# Patient Record
Sex: FEMALE | Race: WHITE | NOT HISPANIC OR LATINO | Employment: UNEMPLOYED | URBAN - METROPOLITAN AREA
[De-identification: names, ages, dates, MRNs, and addresses within clinical notes are randomized per-mention and may not be internally consistent; named-entity substitution may affect disease eponyms.]

---

## 2017-01-07 ENCOUNTER — HOSPITAL ENCOUNTER (OUTPATIENT)
Dept: RADIOLOGY | Facility: HOSPITAL | Age: 52
Discharge: HOME | End: 2017-01-07
Attending: FAMILY MEDICINE
Payer: MEDICAID

## 2017-01-07 DIAGNOSIS — E04.2 MULTIPLE THYROID NODULES: ICD-10-CM

## 2017-01-07 PROCEDURE — 76536 US EXAM OF HEAD AND NECK: CPT | Mod: TC

## 2017-01-07 PROCEDURE — 76536 US EXAM OF HEAD AND NECK: CPT | Mod: 26,,, | Performed by: RADIOLOGY

## 2017-01-10 ENCOUNTER — LAB VISIT (OUTPATIENT)
Dept: LAB | Facility: HOSPITAL | Age: 52
End: 2017-01-10
Attending: FAMILY MEDICINE
Payer: MEDICAID

## 2017-01-10 ENCOUNTER — CLINICAL SUPPORT (OUTPATIENT)
Dept: SPEECH THERAPY | Facility: HOSPITAL | Age: 52
End: 2017-01-10
Attending: FAMILY MEDICINE
Payer: MEDICAID

## 2017-01-10 DIAGNOSIS — D68.59 PROTEIN C DEFICIENCY: ICD-10-CM

## 2017-01-10 DIAGNOSIS — M25.60 DECREASED RANGE OF MOTION: ICD-10-CM

## 2017-01-10 DIAGNOSIS — M54.2 NECK PAIN ON LEFT SIDE: ICD-10-CM

## 2017-01-10 DIAGNOSIS — M79.602 PAIN OF LEFT UPPER EXTREMITY: ICD-10-CM

## 2017-01-10 DIAGNOSIS — R53.1 DECREASED STRENGTH: ICD-10-CM

## 2017-01-10 LAB
INR PPP: 4.6
PROTHROMBIN TIME: 46.4 SEC

## 2017-01-10 PROCEDURE — 97110 THERAPEUTIC EXERCISES: CPT

## 2017-01-10 PROCEDURE — 36415 COLL VENOUS BLD VENIPUNCTURE: CPT

## 2017-01-10 PROCEDURE — 85610 PROTHROMBIN TIME: CPT

## 2017-01-10 NOTE — PROGRESS NOTES
"TIME RECORD    Date:  01/10/2017    Start Time:  0911  Stop Time:  0955    PROCEDURES:    TIMED  Procedure Min.   MT 15   TE 29                 UNTIMED  Procedure Min.             Total Timed Minutes:  44  Total Timed Units:  3  Total Untimed Units:  0  Charges Billed/# of units:  TE-3      Progress/Current Status    Subjective:     Patient ID: Aggie Rasheed is a 51 y.o. female.  Diagnosis:   1. Decreased range of motion     2. Neck pain on left side     3. Pain of left upper extremity     4. Decreased strength          Pain: 4-5/10  Pt states she continues to have pain in neck and L arm.    Objective:     Pt initiated treatment with objective measures taken x 10' f/b MT x 15' consisting of STM/MFR to cervical paraspinals, L UT, and suboccipitals, UT/LS str with pt in supine, then to R SL for L scapular framing. TE 1:1 with PT per log x 19'.    Date 1/10/2017   Visit 2   POC exp  2/7/17    MHP --   MT 15'   UT Str. 3x30" L   LV Str. 3x30" L   Pec Str. 3x30"   Chin Tuck 10x5"   DNF --   Cervical Yasmin 5x5" ea   Rot/SB   Scap ret 10x5"   Lats --   Rows --   Horizontal Abd --   Scaption --   Wall Slides --   Initials KV         Range of Motion/Strength: BUE AROM WNL      Cervical AROM: Pain/Dysfunction with Movement:   Flexion 40 degrees  + into neck and arm   Extension 38 degrees + into neck and arm; more painful than flex   Right side bending WNL     Left side bending 30 degrees     Right rotation 57 degrees pain   Left rotation 52 degrees Pain > R rot      Shoulder   Right     Left   Pain/Dysfunction with Movement     AROM PROM MMT AROM PROM MMT     flexion     5/5     5/5     extension                 abduction     5/5     5/5     adduction                 Internal rotation     5/5     5/5     ER at 90° abd                 ER at 0° abd     5/5     4+/5        Elbow   Right     Left   Pain/Dysfunction with Movement     AROM PROM MMT AROM PROM MMT     flexion     5/5     5/5     extension     5/5     4+/5   " "        Assessment:     Objective measures taken today. Pt cervical ROM has improved some, but continues to have pain in neck with head movement. Pt reported she felt "dizzy" after transferring supine > sit EOM. Also stated she did not eat breakfast this morning. Discussed importance of eating before coming to therapy. Pt verbalized understanding.    Patient Education/Response:     Gave pt HEP consisting of UT and LS str. Demo understanding by performing stretches.    Plans and Goals:     Cont POC. Progress as able.    Short Term Goals (4 Weeks):   1. Improve c-spine flexion and ext 10 degrees each  2. Improve c-spine rotation 10 degrees each  3. Pt will demonstrate FOTO neck survey result of 35% or better    Long Term Goals (8 Weeks):   1. C-spine AROM flexion and rotation WFL  2. Slight/no left c-spine palpable muscle spasms.  3. Pt will demonstrate FOTO neck survey result of 32% or better      "

## 2017-01-12 ENCOUNTER — CLINICAL SUPPORT (OUTPATIENT)
Dept: SPEECH THERAPY | Facility: HOSPITAL | Age: 52
End: 2017-01-12
Attending: FAMILY MEDICINE
Payer: MEDICAID

## 2017-01-12 ENCOUNTER — OFFICE VISIT (OUTPATIENT)
Dept: FAMILY MEDICINE | Facility: HOSPITAL | Age: 52
End: 2017-01-12
Attending: FAMILY MEDICINE
Payer: MEDICAID

## 2017-01-12 VITALS
WEIGHT: 137.13 LBS | HEIGHT: 62 IN | DIASTOLIC BLOOD PRESSURE: 84 MMHG | BODY MASS INDEX: 25.23 KG/M2 | HEART RATE: 101 BPM | SYSTOLIC BLOOD PRESSURE: 150 MMHG

## 2017-01-12 DIAGNOSIS — R53.1 DECREASED STRENGTH: ICD-10-CM

## 2017-01-12 DIAGNOSIS — D68.59 PROTEIN C DEFICIENCY: ICD-10-CM

## 2017-01-12 DIAGNOSIS — E04.2 MULTIPLE THYROID NODULES: ICD-10-CM

## 2017-01-12 DIAGNOSIS — I10 BENIGN ESSENTIAL HTN: ICD-10-CM

## 2017-01-12 DIAGNOSIS — M25.60 DECREASED RANGE OF MOTION: ICD-10-CM

## 2017-01-12 DIAGNOSIS — M79.602 PAIN OF LEFT UPPER EXTREMITY: ICD-10-CM

## 2017-01-12 DIAGNOSIS — G43.009 MIGRAINE WITHOUT AURA AND WITHOUT STATUS MIGRAINOSUS, NOT INTRACTABLE: ICD-10-CM

## 2017-01-12 DIAGNOSIS — M54.2 NECK PAIN ON LEFT SIDE: ICD-10-CM

## 2017-01-12 DIAGNOSIS — M54.2 NECK PAIN ON LEFT SIDE: Primary | ICD-10-CM

## 2017-01-12 DIAGNOSIS — E11.9 TYPE 2 DIABETES MELLITUS WITHOUT OPHTHALMIC MANIFESTATIONS: ICD-10-CM

## 2017-01-12 PROCEDURE — 97110 THERAPEUTIC EXERCISES: CPT

## 2017-01-12 PROCEDURE — 99214 OFFICE O/P EST MOD 30 MIN: CPT | Performed by: FAMILY MEDICINE

## 2017-01-12 RX ORDER — METHOCARBAMOL 500 MG/1
500 TABLET, FILM COATED ORAL 4 TIMES DAILY
Qty: 40 TABLET | Refills: 0 | Status: SHIPPED | OUTPATIENT
Start: 2017-01-12 | End: 2017-01-22

## 2017-01-12 RX ORDER — BUTALBITAL, ACETAMINOPHEN AND CAFFEINE 50; 325; 40 MG/1; MG/1; MG/1
1 TABLET ORAL DAILY PRN
Qty: 30 TABLET | Refills: 0 | Status: SHIPPED | OUTPATIENT
Start: 2017-01-12 | End: 2017-02-22 | Stop reason: SDUPTHER

## 2017-01-12 NOTE — PROGRESS NOTES
"TIME RECORD    Date:  01/12/2017    Start Time:  8:05  Stop Time:  8:55    PROCEDURES:    TIMED  Procedure Min.   MT 25   TE 25                 UNTIMED  Procedure Min.             Total Timed Minutes:  45  Total Timed Units:  3  Total Untimed Units:  0  Charges Billed/# of units:  3TE      Progress/Current Status    Subjective:     Patient ID: Aggie Rasheed is a 51 y.o. female.  Diagnosis:   1. Decreased range of motion     2. Neck pain on left side     3. Pain of left upper extremity     4. Decreased strength       Pain: 5 /10  Pt reports she has left neck down to her shoulder described as aching.    Objective:     Pt initiated treatment with Manual Therapy x 25' consisting of STM/MFR to cervical paraspinals, L UT, and suboccipitals, UT/LS str with pt in supine, then to R SL for L scapular framing. TE 1:1 with PTA per log x 25'.    Date 1/12/17 1/10/2017   Visit 3 2   POC exp  2/7/17     MHP - --   MT 20' 15'   UT Str. 30"x3  3x30" L   LV Str. 30"x3 3x30" L   Pec Str. 30"x3 3x30"   Chin Tuck 10x5" 10x5"   DNF - --   Cervical Yasmin 5"x5 ea  Rot/SB 5x5" ea   Rot/SB   Scap ret 10x5" 10x5"   Lats - --   Rows - --   Horizontal Abd - --   Scaption - --   Wall Slides - --   Initials JA 1/6 KV         Assessment:     Pt able to complete today's session with no reports of increased pain, no adverse reactions.     Patient Education/Response:     Cont HEP    Plans and Goals:     Cont PT to progress as able,   Short Term Goals (4 Weeks):   1. Improve c-spine flexion and ext 10 degrees each  2. Improve c-spine rotation 10 degrees each  3. Pt will demonstrate FOTO neck survey result of 35% or better    Long Term Goals (8 Weeks):   1. C-spine AROM flexion and rotation WFL  2. Slight/no left c-spine palpable muscle spasms.  3. Pt will demonstrate FOTO neck survey result of 32% or better        "

## 2017-01-12 NOTE — PROGRESS NOTES
Subjective:       Patient ID: Aggie Rasheed is a 51 y.o. female.    Chief Complaint: Results (ultrasound)    HPI   Pt is a 51 year old female with PMH DMII, HTN, HLD, Protein C Deficiency on Coumadin who presents to clinic for follow up on recent thyroid ultrasound and INR result. Pt states that she recently started working with PT/OT for her neck pain. She states that she is compliant with all her meds. Pt is currently taking coumadin 2.5 mg six days per week and 5 mg for one day.       Review of Systems   Constitutional: Negative for chills, fatigue and fever.   HENT: Negative for sneezing and sore throat.    Eyes: Negative for pain.   Respiratory: Negative for cough, chest tightness, shortness of breath and wheezing.    Cardiovascular: Negative for chest pain and palpitations.   Gastrointestinal: Negative for abdominal pain, constipation, diarrhea, nausea and vomiting.   Genitourinary: Negative for dysuria.   Musculoskeletal: Negative for back pain.        Neck pain.    Skin: Negative for rash.   Neurological: Negative for headaches.   Psychiatric/Behavioral: Negative for behavioral problems.       Objective:      Vitals:    01/12/17 1508   BP: (!) 150/84   Pulse: 101     Physical Exam   Constitutional: She is oriented to person, place, and time. She appears well-developed and well-nourished.   HENT:   Head: Normocephalic and atraumatic.   Eyes: Conjunctivae are normal. Pupils are equal, round, and reactive to light.   Neck: Normal range of motion.   Cardiovascular: Normal rate, normal heart sounds and intact distal pulses.    Pulmonary/Chest: Effort normal and breath sounds normal. She has no wheezes. She exhibits no tenderness.   Abdominal: Soft. Bowel sounds are normal. There is no tenderness.   Neurological: She is alert and oriented to person, place, and time.   Skin: Skin is warm.   Psychiatric: She has a normal mood and affect.       Assessment:       1. Neck pain on left side    2. Migraine without  aura and without status migrainosus, not intractable    3. Benign essential HTN    4. Protein C deficiency    5. Type 2 diabetes mellitus without ophthalmic manifestations    6. Multiple thyroid nodules        Plan:       Neck pain on left side  -     methocarbamol (ROBAXIN) 500 MG Tab; Take 1 tablet (500 mg total) by mouth 4 (four) times daily.  Dispense: 40 tablet; Refill: 0  - Cont PT/OT     Migraine without aura and without status migrainosus, not intractable  -     butalbital-acetaminophen-caffeine -40 mg (FIORICET, ESGIC) -40 mg per tablet; Take 1 tablet by mouth daily as needed.  Dispense: 30 tablet; Refill: 0    Benign essential HTN  - 150/84 today but states that her BP sys at home have been in the 130s. She states it is elevated now due to neck pain  - Will monitor on next visit and adjust as needed.     Protein C deficiency  -     Protime-INR; Future; Expected date: 1/12/17  - Recent INR on 1/10/17 at 4.6. Advised pt to cut back to 2.5 mg QD. Will recheck INR again for 1/17/17 and if below 3, will plan to switch to Xarelto to pt's large shifts on Coumadin with INR.     Type 2 diabetes mellitus without ophthalmic manifestations  - A1c 6.7 in 12/16  - Cont Metformin and glipzide    Multiple thyroid nodules  -     Ambulatory referral to General Surgery  - recent u/s shows one nodule right lobe is solid measuring 1.5 x 1.1 x 1.2 CM.  Plan for FNA       Return in about 1 month (around 2/12/2017).

## 2017-01-17 ENCOUNTER — LAB VISIT (OUTPATIENT)
Dept: LAB | Facility: HOSPITAL | Age: 52
End: 2017-01-17
Attending: FAMILY MEDICINE
Payer: MEDICAID

## 2017-01-17 ENCOUNTER — CLINICAL SUPPORT (OUTPATIENT)
Dept: SPEECH THERAPY | Facility: HOSPITAL | Age: 52
End: 2017-01-17
Attending: FAMILY MEDICINE
Payer: MEDICAID

## 2017-01-17 DIAGNOSIS — D68.59 PROTEIN C DEFICIENCY: ICD-10-CM

## 2017-01-17 DIAGNOSIS — R53.1 DECREASED STRENGTH: ICD-10-CM

## 2017-01-17 DIAGNOSIS — M54.2 NECK PAIN ON LEFT SIDE: ICD-10-CM

## 2017-01-17 DIAGNOSIS — M79.602 PAIN OF LEFT UPPER EXTREMITY: ICD-10-CM

## 2017-01-17 DIAGNOSIS — M25.60 DECREASED RANGE OF MOTION: ICD-10-CM

## 2017-01-17 LAB
INR PPP: 4.8
PROTHROMBIN TIME: 48.3 SEC

## 2017-01-17 PROCEDURE — 97110 THERAPEUTIC EXERCISES: CPT

## 2017-01-17 PROCEDURE — 85610 PROTHROMBIN TIME: CPT

## 2017-01-17 PROCEDURE — 36415 COLL VENOUS BLD VENIPUNCTURE: CPT

## 2017-01-17 NOTE — PROGRESS NOTES
"TIME RECORD    Date:  01/17/2017    Start Time:  1435  Stop Time:  1517    PROCEDURES:    TIMED  Procedure Min.   MT 15   TE 27                 UNTIMED  Procedure Min.             Total Timed Minutes:  42  Total Timed Units:  3  Total Untimed Units:  0  Charges Billed/# of units:  TE-3      Progress/Current Status    Subjective:     Patient ID: Aggie Rasheed is a 51 y.o. female.  Diagnosis:   1. Decreased range of motion     2. Neck pain on left side     3. Pain of left upper extremity     4. Decreased strength       Pain: 2 /10 in L neck and shoulder  Pt reports she woke up at 3 am with pain, but her pain decreased as the day progressed.    Objective:     Pt initiated treatment with MT x 15' consisting of STM/MFR to upper thoracic and cervical paraspinals, L UT, and suboccipitals, UT/LS str with pt in supine. TE 1:1 with PT per log x 27'. **FOTO NEXT VISIT**    Date 1/17/2017 1/12/17 1/10/2017   Visit 4 3 2   POC exp  2/7/17      MHP -- - --   MT 15' 20' 15'   UT Str. 3x30" B 30"x3  3x30" L   LV Str. 3x30" B 30"x3 3x30" L   Pec Str. 3x30" 30"x3 3x30"   Chin Tuck 12x5" 10x5" 10x5"   Cervical rot 5x5" B     DNF -- - --   Cervical Yasmin 5"x5 ea  Rot/SB 5"x5 ea  Rot/SB 5x5" ea   Rot/SB   Scap ret 10x5" 10x5" 10x5"   Lats 2x10 RTB - --   Rows 2x10 RTB - --   Horizontal Abd -- - --   Scaption -- - --   Wall Slides  - --   Initials KV JA 1/6 KV         Assessment:     Pt tolerated treatment fairly well with reports of increased pain to 3/10 with shoulder lats and rows. Will assess tolerance to treatment next visit.     Patient Education/Response:     Cont HEP    Plans and Goals:     Cont PT to progress as able,   Short Term Goals (4 Weeks):   1. Improve c-spine flexion and ext 10 degrees each  2. Improve c-spine rotation 10 degrees each  3. Pt will demonstrate FOTO neck survey result of 35% or better    Long Term Goals (8 Weeks):   1. C-spine AROM flexion and rotation WFL  2. Slight/no left c-spine palpable muscle " spasms.  3. Pt will demonstrate FOTO neck survey result of 32% or better

## 2017-01-23 DIAGNOSIS — D68.59 PROTEIN C DEFICIENCY: Primary | ICD-10-CM

## 2017-01-23 NOTE — PROGRESS NOTES
Pt called and updated on INR of 4.8. Advised pt to take coumadin 5 x this week and hold dose on Sat/Sun. Will recheck INR in 1 week and if below 3, will plan to switch to Xarelto to pt's large shifts on Coumadin with INR.     Juan Chavez

## 2017-01-24 ENCOUNTER — CLINICAL SUPPORT (OUTPATIENT)
Dept: SPEECH THERAPY | Facility: HOSPITAL | Age: 52
End: 2017-01-24
Attending: FAMILY MEDICINE
Payer: MEDICAID

## 2017-01-24 DIAGNOSIS — M54.2 NECK PAIN ON LEFT SIDE: ICD-10-CM

## 2017-01-24 DIAGNOSIS — M79.602 PAIN OF LEFT UPPER EXTREMITY: ICD-10-CM

## 2017-01-24 DIAGNOSIS — I10 ESSENTIAL HYPERTENSION: ICD-10-CM

## 2017-01-24 DIAGNOSIS — R53.1 DECREASED STRENGTH: ICD-10-CM

## 2017-01-24 DIAGNOSIS — M25.60 DECREASED RANGE OF MOTION: ICD-10-CM

## 2017-01-24 PROCEDURE — 97140 MANUAL THERAPY 1/> REGIONS: CPT

## 2017-01-24 PROCEDURE — 97110 THERAPEUTIC EXERCISES: CPT

## 2017-01-24 RX ORDER — CLONIDINE HYDROCHLORIDE 0.2 MG/1
TABLET ORAL
Qty: 180 TABLET | Refills: 0 | Status: SHIPPED | OUTPATIENT
Start: 2017-01-24 | End: 2017-04-24 | Stop reason: SDUPTHER

## 2017-01-24 NOTE — PROGRESS NOTES
"TIME RECORD    Date:  01/24/2017    Start Time:  1:10  Stop Time:  2:00    PROCEDURES:    TIMED  Procedure Min.   MT 25   TE 25                 UNTIMED  Procedure Min.             Total Timed Minutes:  50  Total Timed Units:  3  Total Untimed Units:  0  Charges Billed/# of units:  TE-2, MT 1      Progress/Current Status    Subjective:     Patient ID: Aggie Rasheed is a 51 y.o. female.  Diagnosis:   1. Decreased range of motion     2. Neck pain on left side     3. Pain of left upper extremity     4. Decreased strength       Pain: 3 /10 in L neck and shoulder   1/10 following interventions    Objective:     Pt initiated treatment with MT x 25' consisting of STM/MFR to upper thoracic and cervical paraspinals, B SCM, Scalenes B  UT, and suboccipitals, UT/LS str with pt in supine. TE 1:1 with PT per log x 25'. **FOTO NEXT VISIT**    Date 1/24/17 1/17/2017 1/12/17 1/10/2017   Visit 5 4 3 2   POC exp  2/7/17       MHP  -- - --   MT 25' 15' 20' 15'   UT Str. 3 x 30" 3x30" B 30"x3  3x30" L   LV Str. 3 x 30" 3x30" B 30"x3 3x30" L   Pec Str. 3 x 30" 3x30" 30"x3 3x30"   Chin Tuck 15x 5" 12x5" 10x5" 10x5"   Cervical rot 5 x 5 B 5x5" B     DNF  -- - --   Cervical Yasmin --- 5"x5 ea  Rot/SB 5"x5 ea  Rot/SB 5x5" ea   Rot/SB   Scap ret 10 x 5" 10x5" 10x5" 10x5"   Lats 2 x 10 RTB 2x10 RTB - --   Rows 2 x 10 RTB 2x10 RTB - --   Horizontal Abd  -- - --   Scaption  -- - --   Wall Slides   - --   Initials MB 1/6 KV JA 1/6 KV         Assessment:     Pt tolerated treatment fairly well. Reviewed HEP and necessity of compliance. Will assess tolerance to treatment next visit.     Patient Education/Response:     Cont HEP    Plans and Goals:     Cont PT to progress as able,   Short Term Goals (4 Weeks):   1. Improve c-spine flexion and ext 10 degrees each  2. Improve c-spine rotation 10 degrees each  3. Pt will demonstrate FOTO neck survey result of 35% or better    Long Term Goals (8 Weeks):   1. C-spine AROM flexion and rotation WFL  2. " Slight/no left c-spine palpable muscle spasms.  3. Pt will demonstrate FOTO neck survey result of 32% or better

## 2017-01-26 ENCOUNTER — CLINICAL SUPPORT (OUTPATIENT)
Dept: SPEECH THERAPY | Facility: HOSPITAL | Age: 52
End: 2017-01-26
Attending: FAMILY MEDICINE
Payer: MEDICAID

## 2017-01-26 DIAGNOSIS — M54.2 NECK PAIN ON LEFT SIDE: ICD-10-CM

## 2017-01-26 DIAGNOSIS — M79.602 PAIN OF LEFT UPPER EXTREMITY: ICD-10-CM

## 2017-01-26 DIAGNOSIS — R53.1 DECREASED STRENGTH: ICD-10-CM

## 2017-01-26 DIAGNOSIS — M25.60 DECREASED RANGE OF MOTION: ICD-10-CM

## 2017-01-26 PROCEDURE — 97110 THERAPEUTIC EXERCISES: CPT

## 2017-01-26 PROCEDURE — 97124 MASSAGE THERAPY: CPT

## 2017-01-26 NOTE — PROGRESS NOTES
"TIME RECORD    Date:  01/26/2017    Start Time:  1:10  Stop Time:  2:05    PROCEDURES:    TIMED  Procedure Min.   MT 20   TE 30                 UNTIMED  Procedure Min.   UBE 5         Total Timed Minutes:  50  Total Timed Units:  3  Total Untimed Units:  0  Charges Billed/# of units:  TE-2, MT 1      Progress/Current Status    Subjective:     Patient ID: Aggie Rasheed is a 51 y.o. female.  Diagnosis:   1. Decreased range of motion     2. Neck pain on left side     3. Pain of left upper extremity     4. Decreased strength       Pain: 3 /10 in L neck and L shoulder. Reports feeling a little sore following last visit but better somehow.    1/10 following interventions    Objective:     Pt initiated treatment with MT x 15' consisting of STM/MFR to upper thoracic and cervical paraspinals, B SCM, Scalenes B  UT, and suboccipitals, UT/LS str with pt in supine. TE supervised with PTA per log x 35'. Prefers to not lay on her stomach for TE. **FOTO NEXT VISIT**    Date 1/26/17 1/24/17 1/17/2017 1/12/17 1/10/2017   Visit 6 5 4 3 2           POC exp  2/7/17        UBE 2 x 15       MHP --- --- -- - --   MT 15' 25' 15' 20' 15'   UT Str. 3 x 30" B 3 x 30" 3x30" B 30"x3  3x30" L   LV Str. 3 x 30" B 3 x 30" 3x30" B 30"x3 3x30" L   Pec Str. 3 x 30" B 3 x 30" 3x30" 30"x3 3x30"   Chin Tuck 15 x 5" 15x 5" 12x5" 10x5" 10x5"   Cervical rot 5 x 5 B 5 x 5 B 5x5" B     DNF   -- - --   Cervical Yasmin  --- 5"x5 ea  Rot/SB 5"x5 ea  Rot/SB 5x5" ea   Rot/SB   Scap ret  10 x 5" 10x5" 10x5" 10x5"   Lats 2 x 15 B  RTB 2 x 10 RTB 2x10 RTB - --   ER L 2x15 RTB       SL Abd 2x10 1#       Rows 2x15 B  RTB 2 x 10 RTB 2x10 RTB - --   Horizontal Abd   -- - --   Scaption   -- - --   Wall Slides    - --   Initials MB 2/6 MB 1/6 KV JA 1/6 KV         Assessment:     Pt tolerated progression of treatment fairly well. Continued intermittent c/o tingling sensation L UE. Reviewed HEP and necessity of compliance. Will assess tolerance to treatment next visit. "     Patient Education/Response:     Cont HEP    Plans and Goals:     Cont PT to progress as able,   Short Term Goals (4 Weeks):   1. Improve c-spine flexion and ext 10 degrees each  2. Improve c-spine rotation 10 degrees each  3. Pt will demonstrate FOTO neck survey result of 35% or better    Long Term Goals (8 Weeks):   1. C-spine AROM flexion and rotation WFL  2. Slight/no left c-spine palpable muscle spasms.  3. Pt will demonstrate FOTO neck survey result of 32% or better

## 2017-01-30 ENCOUNTER — CLINICAL SUPPORT (OUTPATIENT)
Dept: SPEECH THERAPY | Facility: HOSPITAL | Age: 52
End: 2017-01-30
Attending: FAMILY MEDICINE
Payer: MEDICAID

## 2017-01-30 DIAGNOSIS — R53.1 DECREASED STRENGTH: ICD-10-CM

## 2017-01-30 DIAGNOSIS — M25.60 DECREASED RANGE OF MOTION: ICD-10-CM

## 2017-01-30 DIAGNOSIS — M54.2 NECK PAIN ON LEFT SIDE: ICD-10-CM

## 2017-01-30 DIAGNOSIS — M79.602 PAIN OF LEFT UPPER EXTREMITY: ICD-10-CM

## 2017-01-30 PROCEDURE — 97140 MANUAL THERAPY 1/> REGIONS: CPT

## 2017-01-30 PROCEDURE — 97110 THERAPEUTIC EXERCISES: CPT

## 2017-01-30 NOTE — PROGRESS NOTES
"TIME RECORD    Date:  01/30/2017    Start Time:  300  Stop Time:  400    PROCEDURES:    TIMED  Procedure Min.   MT 20   TE 10   TE supervised 30  NC             UNTIMED  Procedure Min.             Total Timed Minutes:  30  Total Timed Units:  2  Total Untimed Units:  0  Charges Billed/# of units:  TE 1, MT 1      Progress/Current Status    Subjective:     Patient ID: Aggie Rasheed is a 51 y.o. female.  Diagnosis:   1. Decreased range of motion     2. Neck pain on left side     3. Pain of left upper extremity     4. Decreased strength       Pain: 2-3 /10 in L neck and L shoulder.   Objective:     Pt received MT x 20 minutes consisting of STM/MFR to upper thoracic and cervical paraspinals, B SCM, Scalenes B  UT, and suboccipitals, UT/LS str with pt in supine. TE 1:1 with PTA x 10 minutes and supervised with PTA per log x 30'.   PATIENT DOES NOT LIKE PRONE  Date 1/30/17 1/26/17 1/24/17 1/17/2017 1/12/17 1/10/2017   Visit 7 6 5 4 3 2            POC exp  2/7/17          1/1 2 x 15       MHP -- --- --- -- - --   MT 20' 15' 25' 15' 20' 15'   UT Str. 30"x3 B 3 x 30" B 3 x 30" 3x30" B 30"x3  3x30" L   LV Str. 30"x3 B 3 x 30" B 3 x 30" 3x30" B 30"x3 3x30" L   Pec Str. 30"x3 B 3 x 30" B 3 x 30" 3x30" 30"x3 3x30"   Chin Tuck 5"x15 15 x 5" 15x 5" 12x5" 10x5" 10x5"   Cervical rot 5"x5 B 5 x 5 B 5 x 5 B 5x5" B     DNF -   -- - --   Cervical Yasmin -  --- 5"x5 ea  Rot/SB 5"x5 ea  Rot/SB 5x5" ea   Rot/SB   Scap ret 5"x10  10 x 5" 10x5" 10x5" 10x5"   Lats 2x15 B RTB 2 x 15 B  RTB 2 x 10 RTB 2x10 RTB - --   ER L 2x15 RTB 2x15 RTB       SL Abd 1#  2x10 2x10 1#       Rows 2x15 B RTB 2x15 B  RTB 2 x 10 RTB 2x10 RTB - --   Horizontal Abd -   -- - --   Scaption -   -- - --   Wall Slides -    - --   Initials CS 3/6 MB 2/6 MB 1/6 KV JA 1/6 KV         Assessment:     Patient performed above without report of increased pain.    Patient Education/Response:     Cont HEP    Plans and Goals:     Cont PT to progress as able,   Short Term " Goals (4 Weeks):   1. Improve c-spine flexion and ext 10 degrees each  2. Improve c-spine rotation 10 degrees each  3. Pt will demonstrate FOTO neck survey result of 35% or better    Long Term Goals (8 Weeks):   1. C-spine AROM flexion and rotation WFL  2. Slight/no left c-spine palpable muscle spasms.  3. Pt will demonstrate FOTO neck survey result of 32% or better

## 2017-01-31 ENCOUNTER — HOSPITAL ENCOUNTER (OUTPATIENT)
Dept: RADIOLOGY | Facility: HOSPITAL | Age: 52
Discharge: HOME | End: 2017-01-31
Attending: INTERNAL MEDICINE
Payer: MEDICAID

## 2017-01-31 DIAGNOSIS — M25.50 MULTIPLE JOINT PAIN: ICD-10-CM

## 2017-01-31 DIAGNOSIS — D68.59 PROTEIN C DEFICIENCY: ICD-10-CM

## 2017-01-31 DIAGNOSIS — M25.50 MULTIPLE JOINT PAIN: Primary | ICD-10-CM

## 2017-01-31 DIAGNOSIS — M32.9 SYSTEMIC LUPUS ERYTHEMATOSUS: ICD-10-CM

## 2017-01-31 PROCEDURE — 73130 X-RAY EXAM OF HAND: CPT | Mod: 50,TC

## 2017-01-31 PROCEDURE — 73130 X-RAY EXAM OF HAND: CPT | Mod: 26,50,, | Performed by: RADIOLOGY

## 2017-02-01 ENCOUNTER — CLINICAL SUPPORT (OUTPATIENT)
Dept: SPEECH THERAPY | Facility: HOSPITAL | Age: 52
End: 2017-02-01
Attending: FAMILY MEDICINE
Payer: MEDICAID

## 2017-02-01 DIAGNOSIS — M25.60 DECREASED RANGE OF MOTION: ICD-10-CM

## 2017-02-01 DIAGNOSIS — M54.2 NECK PAIN ON LEFT SIDE: ICD-10-CM

## 2017-02-01 DIAGNOSIS — M79.602 PAIN OF LEFT UPPER EXTREMITY: ICD-10-CM

## 2017-02-01 DIAGNOSIS — R53.1 DECREASED STRENGTH: ICD-10-CM

## 2017-02-01 PROCEDURE — 97110 THERAPEUTIC EXERCISES: CPT

## 2017-02-01 NOTE — PROGRESS NOTES
"TIME RECORD    Date:  02/01/2017    Start Time:  205  Stop Time:  255    PROCEDURES:    TIMED  Procedure Min.   Manual therapy 20   Therex 30         Total Timed Minutes:  50  Total Timed Units:  3  Total Untimed Units:  0  Charges Billed/# of units:  3  TE      Progress/Current Status    Subjective:     Patient ID: Aggie Rasheed is a 51 y.o. female.  Diagnosis:   1. Decreased range of motion     2. Neck pain on left side     3. Pain of left upper extremity     4. Decreased strength       Pain: 2 /10  Patient reports she has been having left elbow pain. States MD thinks she "may have irritated a tendon. - or something like that - she's hard to understand."    Objective:     Pt received MT x 20 minutes consisting of STM/MFR to upper thoracic and cervical paraspinals, B SCM, Scalenes B  UT, and suboccipitals, UT/LS str with pt in supine. TE 1:1 with PTA per log x 30'.   PATIENT DOES NOT LIKE PRONE  Date 2/1/17 1/30/17 1/26/17 1/24/17 1/17/2017 1/12/17 1/10/2017   Visit 8 7 6 5 4 3 2             POC exp  2/7/17          UBE HELD 120 1/1 2 x 15       MHP  -- --- --- -- - --   MT 20' 20' 15' 25' 15' 20' 15'   UT Str. 30"x3 B 30"x3 B 3 x 30" B 3 x 30" 3x30" B 30"x3  3x30" L   LV Str. 30"x3 B 30"x3 B 3 x 30" B 3 x 30" 3x30" B 30"x3 3x30" L   Pec Str. 30"x3 B 30"x3 B 3 x 30" B 3 x 30" 3x30" 30"x3 3x30"   Chin Tuck 5"x15 5"x15 15 x 5" 15x 5" 12x5" 10x5" 10x5"   Cervical rot 5"x5 5"x5 B 5 x 5 B 5 x 5 B 5x5" B     DNF  -   -- - --   Cervical Yasmin  -  --- 5"x5 ea  Rot/SB 5"x5 ea  Rot/SB 5x5" ea   Rot/SB   Scap ret 5"x10 5"x10  10 x 5" 10x5" 10x5" 10x5"   Lats RTB   2x15 2x15 B RTB 2 x 15 B  RTB 2 x 10 RTB 2x10 RTB - --   ER L HELD 2x15 RTB 2x15 RTB       SL Abd 1# 3x10 1#  2x10 2x10 1#       Rows HELD 2x15 B RTB 2x15 B  RTB 2 x 10 RTB 2x10 RTB - --   Horizontal Abd  -   -- - --   Scaption  -   -- - --   Wall Slides  -    - --   Initials DH 4/6 CS 3/6 MB 2/6 MB 1/6 KV JA 1/6 KV       Assessment:     Patient able to increase " "activity as noted.  Held RTM ER and rows to not irritate left elbow today.  Patietn states " a little sore - but no pain" after treatment.    Patient Education/Response:     Patient educated to continue HEP.  Verbalized understanding.    Plans and Goals:     Cont PT to progress as able,     Short Term Goals (4 Weeks):   1. Improve c-spine flexion and ext 10 degrees each  2. Improve c-spine rotation 10 degrees each  3. Pt will demonstrate FOTO neck survey result of 35% or better    Long Term Goals (8 Weeks):   1. C-spine AROM flexion and rotation WFL  2. Slight/no left c-spine palpable muscle spasms.  3. Pt will demonstrate FOTO neck survey result of 32% or better        "

## 2017-02-02 ENCOUNTER — TELEPHONE (OUTPATIENT)
Dept: FAMILY MEDICINE | Facility: HOSPITAL | Age: 52
End: 2017-02-02

## 2017-02-02 NOTE — TELEPHONE ENCOUNTER
----- Message from Minerva Torres sent at 2/1/2017  3:05 PM CST -----  Contact: [facility]  Would like to know if you need to add any other blood test for Pt

## 2017-02-02 NOTE — TELEPHONE ENCOUNTER
----- Message from Minerva Torres sent at 1/31/2017  1:46 PM CST -----  Contact: self   Pt is waiting on order for her blood work.     Also, she is having an ultra sound biopsy on her throat( Feb 8th ) and would like to know how many days before her procedure she should stop blood thinner pills.

## 2017-02-03 DIAGNOSIS — Z79.01 ANTICOAGULANT LONG-TERM USE: Primary | ICD-10-CM

## 2017-02-03 NOTE — PROGRESS NOTES
Pt scheduled for thyroid biopsy on 2/8/17. On coumadin for protein s deficiency. Informed patient to stop coumadin today and restart day after procedure. INR on Monday and Wednesday.  Last INR supratherapeutic; adjustments made on dose, but no f/u INR seen.

## 2017-02-06 ENCOUNTER — LAB VISIT (OUTPATIENT)
Dept: LAB | Facility: HOSPITAL | Age: 52
End: 2017-02-06
Attending: FAMILY MEDICINE
Payer: MEDICAID

## 2017-02-06 DIAGNOSIS — Z79.01 ANTICOAGULANT LONG-TERM USE: ICD-10-CM

## 2017-02-06 LAB
INR PPP: 1.1
PROTHROMBIN TIME: 11.6 SEC

## 2017-02-06 PROCEDURE — 85610 PROTHROMBIN TIME: CPT

## 2017-02-06 PROCEDURE — 36415 COLL VENOUS BLD VENIPUNCTURE: CPT

## 2017-02-07 ENCOUNTER — CLINICAL SUPPORT (OUTPATIENT)
Dept: SPEECH THERAPY | Facility: HOSPITAL | Age: 52
End: 2017-02-07
Attending: FAMILY MEDICINE
Payer: MEDICAID

## 2017-02-07 DIAGNOSIS — R53.1 DECREASED STRENGTH: ICD-10-CM

## 2017-02-07 DIAGNOSIS — M25.60 DECREASED RANGE OF MOTION: ICD-10-CM

## 2017-02-07 DIAGNOSIS — M79.602 PAIN OF LEFT UPPER EXTREMITY: ICD-10-CM

## 2017-02-07 DIAGNOSIS — M54.2 NECK PAIN ON LEFT SIDE: ICD-10-CM

## 2017-02-07 PROCEDURE — 97110 THERAPEUTIC EXERCISES: CPT

## 2017-02-07 NOTE — PROGRESS NOTES
"TIME RECORD    Date:  02/07/2017    Start Time:  300  Stop Time:  350    PROCEDURES:    TIMED  Procedure Min.   Manual therapy 20   Therex 30     Total Timed Minutes:  50  Total Timed Units:  3  Total Untimed Units:  0  Charges Billed/# of units:  3  TE      Progress/Current Status    Subjective:     Patient ID: Aggie Rasheed is a 51 y.o. female.  Diagnosis:   1. Decreased range of motion     2. Neck pain on left side     3. Pain of left upper extremity     4. Decreased strength       "My neck -just a tad.  My left elbow bothers me more."  Patient reports relief after last visit and through the weekend.    Objective:     Manual therapy provided x 20 minutes including STM/MFR to B upper thoracic and cervical paraspinals with elongation, B SCM, Scalenes B  UT, and suboccipitals, UT/LS str with pt in supine. TE 1:1 with PTA per log x 30'. Added trial DNF today    PATIENT DOES NOT LIKE PRONE  Date 2/7/17 2/1/17 1/30/17 1/26/17 1/24/17 1/17/2017 1/12/17 1/10/2017   Visit 9 8 7 6 5 4 3 2              POC exp  2/7/17           UBE HELD HELD 120 1/1 2 x 15       MHP   -- --- --- -- - --   MT 20' 20' 20' 15' 25' 15' 20' 15'   UT Str. 30"x3 B 30"x3 B 30"x3 B 3 x 30" B 3 x 30" 3x30" B 30"x3  3x30" L   LV Str. 30"x3 B 30"x3 B 30"x3 B 3 x 30" B 3 x 30" 3x30" B 30"x3 3x30" L   Pec Str. 30"x3 30"x3 B 30"x3 B 3 x 30" B 3 x 30" 3x30" 30"x3 3x30"   Chin Tuck 5"x15 5"x15 5"x15 15 x 5" 15x 5" 12x5" 10x5" 10x5"   Cervical rot 5"x5 5"x5 5"x5 B 5 x 5 B 5 x 5 B 5x5" B     DNF 3"x5  -   -- - --   Cervical Yasmin 5"x5 ea  sb/rot  -  --- 5"x5 ea  Rot/SB 5"x5 ea  Rot/SB 5x5" ea   Rot/SB   Scap ret 5"x12 5"x10 5"x10  10 x 5" 10x5" 10x5" 10x5"   Lats GTB   2x10 RTB   2x15 2x15 B RTB 2 x 15 B  RTB 2 x 10 RTB 2x10 RTB - --   ER L HELD HELD 2x15 RTB 2x15 RTB       SL Abd 1# 2x15 1# 3x10 1#  2x10 2x10 1#       Rows Held elbow pn. HELD 2x15 B RTB 2x15 B  RTB 2 x 10 RTB 2x10 RTB - --   Horizontal Abd YTB 2x10  -   -- - --   Scaption --  -   -- - -- "   Wall Slides Next?  -    - --   Initials DH 5/6 DH 4/6 CS 3/6 MB 2/6 MB 1/6 KV JA 1/6 KV         Assessment:     Patient able to increase activity as noted today with added trial DNF and progression of resistance TE with GTB and trial horizontal abd.  Continued hold of TE as noted due to reports of left elbow pain.    Patient Education/Response:     Patient educated to continue HEP.  Verbalized understanding.    Plans and Goals:     Cont PT to progress as able.    Short Term Goals (4 Weeks):   1. Improve c-spine flexion and ext 10 degrees each  2. Improve c-spine rotation 10 degrees each  3. Pt will demonstrate FOTO neck survey result of 35% or better    Long Term Goals (8 Weeks):   1. C-spine AROM flexion and rotation WFL  2. Slight/no left c-spine palpable muscle spasms.  3. Pt will demonstrate FOTO neck survey result of 32% or better

## 2017-02-08 ENCOUNTER — HOSPITAL ENCOUNTER (OUTPATIENT)
Facility: HOSPITAL | Age: 52
Discharge: HOME | End: 2017-02-08
Attending: RADIOLOGY | Admitting: SURGERY
Payer: MEDICAID

## 2017-02-08 ENCOUNTER — SURGERY (OUTPATIENT)
Age: 52
End: 2017-02-08

## 2017-02-08 ENCOUNTER — HOSPITAL ENCOUNTER (OUTPATIENT)
Dept: INTERVENTIONAL RADIOLOGY/VASCULAR | Facility: HOSPITAL | Age: 52
Discharge: HOME | End: 2017-02-08
Attending: SURGERY | Admitting: RADIOLOGY
Payer: MEDICAID

## 2017-02-08 ENCOUNTER — HOSPITAL ENCOUNTER (OUTPATIENT)
Dept: RADIOLOGY | Facility: HOSPITAL | Age: 52
Discharge: HOME | End: 2017-02-08
Attending: SURGERY | Admitting: RADIOLOGY
Payer: MEDICAID

## 2017-02-08 VITALS
SYSTOLIC BLOOD PRESSURE: 140 MMHG | RESPIRATION RATE: 16 BRPM | HEART RATE: 81 BPM | OXYGEN SATURATION: 98 % | HEIGHT: 62 IN | TEMPERATURE: 98 F | BODY MASS INDEX: 25.03 KG/M2 | WEIGHT: 136 LBS | DIASTOLIC BLOOD PRESSURE: 79 MMHG

## 2017-02-08 DIAGNOSIS — E11.9 TYPE 2 DIABETES MELLITUS WITHOUT OPHTHALMIC MANIFESTATIONS: ICD-10-CM

## 2017-02-08 DIAGNOSIS — E04.2 MULTIPLE THYROID NODULES: ICD-10-CM

## 2017-02-08 DIAGNOSIS — E78.5 HYPERLIPIDEMIA, UNSPECIFIED HYPERLIPIDEMIA TYPE: ICD-10-CM

## 2017-02-08 DIAGNOSIS — I10 ESSENTIAL HYPERTENSION: ICD-10-CM

## 2017-02-08 DIAGNOSIS — D68.59 PROTEIN C DEFICIENCY: ICD-10-CM

## 2017-02-08 PROCEDURE — 88173 CYTOPATH EVAL FNA REPORT: CPT | Mod: 26,,, | Performed by: PATHOLOGY

## 2017-02-08 PROCEDURE — 76942 ECHO GUIDE FOR BIOPSY: CPT | Mod: 26,,, | Performed by: RADIOLOGY

## 2017-02-08 PROCEDURE — 10022 IR FNA WITH ULTRASOUND: CPT | Mod: ,,, | Performed by: RADIOLOGY

## 2017-02-08 PROCEDURE — 76942 ECHO GUIDE FOR BIOPSY: CPT | Mod: TC

## 2017-02-08 PROCEDURE — 88172 CYTP DX EVAL FNA 1ST EA SITE: CPT | Mod: 26,,, | Performed by: PATHOLOGY

## 2017-02-08 PROCEDURE — 88172 CYTP DX EVAL FNA 1ST EA SITE: CPT | Performed by: PATHOLOGY

## 2017-02-08 RX ORDER — WARFARIN 2.5 MG/1
2.5 TABLET ORAL DAILY
COMMUNITY
End: 2017-02-23 | Stop reason: ALTCHOICE

## 2017-02-08 NOTE — H&P
Radiology History & Physical      SUBJECTIVE:     Chief Complaint: right tyroid nodule    History of Present Illness:  Aggie Rasheed is a 51 y.o. female who presents for fna  Past Medical History   Diagnosis Date    Diabetes mellitus type II     Headache associated with hormonal factors     Hyperlipidemia     Hypertension     Lupus     Protein C deficiency      Past Surgical History   Procedure Laterality Date     section      Tonsillectomy      Aortic valve surgery      Toe amputation      Wrist fracture surgery         Home Meds:   Prior to Admission medications    Medication Sig Start Date End Date Taking? Authorizing Provider   butalbital-acetaminophen-caffeine -40 mg (FIORICET, ESGIC) -40 mg per tablet Take 1 tablet by mouth daily as needed. 17   Juan Chavez MD   cloNIDine (CATAPRES) 0.2 MG tablet TAKE 3 TABLETS BY MOUTH TWICE DAILY 17   Juan Chavez MD   gabapentin (NEURONTIN) 600 MG tablet Take 2 tablets (1,200 mg total) by mouth 3 (three) times daily. 16  Juan Chavez MD   glipiZIDE (GLUCOTROL) 5 MG tablet Take 2 tablets (10 mg total) by mouth once daily. 16   Juan Chavez MD   ibuprofen (ADVIL,MOTRIN) 800 MG tablet Take 1 tablet (800 mg total) by mouth 3 (three) times daily. 16   Eric Guerra MD   losartan (COZAAR) 25 MG tablet Take 1 tablet (25 mg total) by mouth once daily. 16   Juan Chavez MD   metformin (GLUCOPHAGE) 1000 MG tablet Take 1 tablet (1,000 mg total) by mouth 2 (two) times daily. 16   Juan Chavez MD   naproxen (NAPROSYN) 500 MG tablet Take 1 tablet (500 mg total) by mouth 2 (two) times daily with meals. 16   Juan Chavez MD   OMEPRAZOLE MAGNESIUM (PRILOSEC OTC ORAL) Take by mouth.    Historical Provider, MD   promethazine (PHENERGAN) 25 MG tablet Take 1 tablet (25 mg total) by mouth every 6 (six) hours as needed for Nausea. 16   Rupert Spencer MD   propranolol (INDERAL) 40 MG tablet Take 1  tablet (40 mg total) by mouth 3 (three) times daily. 12/13/16   Juan Chavez MD   simvastatin (ZOCOR) 40 MG tablet Take 1 tablet (40 mg total) by mouth once daily. 8/15/16   Juan Chavez MD   warfarin (COUMADIN) 2.5 MG tablet Take 2.5 mg by mouth Daily.    Historical Provider, MD   amlodipine (NORVASC) 5 MG tablet Take 1 tablet (5 mg total) by mouth once daily. 11/11/16 2/8/17  Rupert Spencer MD     Anticoagulants/Antiplatelets: no anticoagulation    Allergies:   Review of patient's allergies indicates:   Allergen Reactions    Niacin preparations Other (See Comments)     Red inflammed eyes    Codeine Rash   =      OBJECTIVE:     Vital Signs (Most Recent)       Physical Exam:    General: no acute distress  Mental Status: alert and oriented to person, place and time  HEENT: normocephalic, atraumatic  Chest: unlabored breathing  Heart: regular heart rate  Abdomen: nondistended  Extremity: moves all extremities    Laboratory  Lab Results   Component Value Date    INR 1.0 02/08/2017       Lab Results   Component Value Date    WBC 8.10 11/10/2016    HGB 10.5 (L) 11/10/2016    HCT 35.4 (L) 11/10/2016    MCV 70 (L) 11/10/2016     11/10/2016      Lab Results   Component Value Date     (H) 11/10/2016     11/10/2016    K 4.1 11/10/2016     11/10/2016    CO2 20 (L) 11/10/2016    BUN 17 11/10/2016    CREATININE 1.2 11/10/2016    CALCIUM 9.7 11/10/2016    MG 2.0 04/24/2009    ALT 14 11/10/2016    AST 17 11/10/2016    ALBUMIN 4.0 11/10/2016    BILITOT 0.4 11/10/2016       ASSESSMENT/PLAN:     Sedation Plan: local  Patient will undergo fna.    Jon Iglesias MD  Staff Radiologist  Department of Radiology  Pager: [phone]

## 2017-02-08 NOTE — PROCEDURES
Radiology Post-Procedure Note    Pre Op Diagnosis: right thyroid nodule  Post Op Diagnosis: Same    Procedure: thyroid fna    Procedure performed by: Dr Jon Iglesias    Written Informed Consent Obtained: Yes  Specimen Removed: YES fna   Estimated Blood Loss: Minimal    Findings:   No complications    Patient tolerated procedure well.    Jon Iglesias MD  Staff Radiologist  Department of Radiology  Pager: [phone]

## 2017-02-20 ENCOUNTER — CLINICAL SUPPORT (OUTPATIENT)
Dept: REHABILITATION | Facility: HOSPITAL | Age: 52
End: 2017-02-20
Attending: FAMILY MEDICINE
Payer: MEDICAID

## 2017-02-20 DIAGNOSIS — M25.60 DECREASED RANGE OF MOTION: ICD-10-CM

## 2017-02-20 DIAGNOSIS — M79.602 PAIN OF LEFT UPPER EXTREMITY: ICD-10-CM

## 2017-02-20 DIAGNOSIS — R53.1 DECREASED STRENGTH: ICD-10-CM

## 2017-02-20 DIAGNOSIS — M54.2 NECK PAIN ON LEFT SIDE: ICD-10-CM

## 2017-02-20 PROCEDURE — 97110 THERAPEUTIC EXERCISES: CPT | Mod: PN

## 2017-02-20 NOTE — PLAN OF CARE
TIME RECORD    Date: 02/20/2017    PHYSICAL THERAPY UPDATED PLAN OF TREATMENT    Patient name: Aggie Rasheed  Onset Date:  Nov. 11, 2016  SOC Date:  12/8/16  Primary Diagnosis:    1. Decreased range of motion     2. Neck pain on left side     3. Pain of left upper extremity     4. Decreased strength       Treatment Diagnosis:  Neck pain with Radiculopathy  Certification Period:  2/20/17 to 2/20/17  Precautions:  Standard  Visits from SOC:  10  Functional Level Prior to SOC:  Independent    Updated Assessment:  Since beginning PT, pt has been seen 10 times since initial evaluation on 12/8/16. Overall, pt has made good, steady progress with her PT treatments and has worked hard towards all of her PT goals as evidenced by subjective and objective improvements. Since attending PT she has met 5/6 goals. She will be d/c'd with an updated HEP and was instructed to contact us with any other questions or concerns. Pt agreeable to d/c.    Range of Motion/Strength: BUE AROM WNL       Cervical AROM: Pain/Dysfunction with Movement:   Flexion 50 degrees     Extension 52 degrees    Right side bending WNL      Left side bending 40 degrees      Right rotation 60 degrees    Left rotation 52 degrees       Shoulder    Right       Left    Pain/Dysfunction with Movement      AROM PROM MMT AROM PROM MMT      flexion       5/5       5/5      extension                        abduction       5/5       5/5      adduction                        Internal rotation       5/5       5/5      ER at 90° abd                        ER at 0° abd       5/5       4+/5          Elbow    Right       Left    Pain/Dysfunction with Movement      AROM PROM MMT AROM PROM MMT      flexion       5/5       5/5      extension       5/5       5/5            Previous Short Term Goals Status:    Short Term Goals (4 Weeks):   1. Improve c-spine flexion and ext 10 degrees each - MET  2. Improve c-spine rotation 10 degrees each - Partially MET  3. Pt will demonstrate  FOTO neck survey result of 35% or better - MET    Long Term Goal Status:      Long Term Goals (8 Weeks):   1. C-spine AROM flexion and rotation WFL - MET  2. Slight/no left c-spine palpable muscle spasms. - MET  3. Pt will demonstrate FOTO neck survey result of 32% or better - MET    Reasons for Recertification of Therapy:   Required updated POC.    Certification Period: 2/22/17 to 2/22/17  Recommended Treatment Plan:  REHAB SERVICES OUTPATIENT DISCHARGE SUMMARY  Physical Therapy      Name:  Aggie Rasheed  Date:  2/20/17  Date of Evaluation:  1/10/17  Physician:  Dr. Guerra  Total # Of Visits:  10    Diagnosis:    1. Decreased range of motion     2. Neck pain on left side     3. Pain of left upper extremity     4. Decreased strength         Physical/Functional Status:  See above    The patient is to be discharged from our Therapy service for the following reason(s):  Patient is now asymptomatic and Patient has reached the maximum rehab potential for the present time. Pt Requested d/c.    Degree of Goal Achievement:  Patient has partially met goals    Patient Education:  Gave pt HEP including UT, LS, and pec str; chin tuck; cervical rot; DNF strengthening; cervical isometrics; scap retraction; shoulder lats, rows, and hor abduction. Pt verbalized understanding.    Discharge Plan:  Home Program:  See above and Recommendations made for follow up therapy care:  Per MD orders.          Therapist's Name: Margaret Sky, PT, DPT   Date: 02/20/2017    I CERTIFY THE NEED FOR THESE SERVICES FURNISHED UNDER THIS PLAN OF TREATMENT AND WHILE UNDER MY CARE    Physician's comments: ________________________________________________________________________________________________________________________________________________      Physician's Name: ___________________________________

## 2017-02-20 NOTE — PROGRESS NOTES
"TIME RECORD    Date:  02/20/2017    Start Time:  1503  Stop Time:  1528    PROCEDURES:    TIMED  Procedure Min.   TE 25     Total Timed Minutes:  25  Total Timed Units:  2  Total Untimed Units:  0  Charges Billed/# of units:  TE-2      Progress/Current Status    Subjective:     Patient ID: Aggie Rasheed is a 51 y.o. female.  Diagnosis:   1. Decreased range of motion     2. Neck pain on left side     3. Pain of left upper extremity     4. Decreased strength       Pt stated that she has been feeling "much better" and would like to try performing her HEP without continuing with therapy.    Objective:     Objective measures taken x 15' f/b HEP education x 10'.     PATIENT DOES NOT LIKE PRONE  Date 2/20/2017 2/7/17 2/1/17 1/30/17 1/26/17 1/24/17 1/17/2017 1/12/17 1/10/2017   Visit 10 9 8 7 6 5 4 3 2               POC exp  2/20/17            UBE -- HELD HELD 120 1/1 2 x 15       MHP --   -- --- --- -- - --   MT -- 20' 20' 20' 15' 25' 15' 20' 15'   UT Str. -- 30"x3 B 30"x3 B 30"x3 B 3 x 30" B 3 x 30" 3x30" B 30"x3  3x30" L   LV Str. -- 30"x3 B 30"x3 B 30"x3 B 3 x 30" B 3 x 30" 3x30" B 30"x3 3x30" L   Pec Str. -- 30"x3 30"x3 B 30"x3 B 3 x 30" B 3 x 30" 3x30" 30"x3 3x30"   Chin Tuck -- 5"x15 5"x15 5"x15 15 x 5" 15x 5" 12x5" 10x5" 10x5"   Cervical rot -- 5"x5 5"x5 5"x5 B 5 x 5 B 5 x 5 B 5x5" B     DNF -- 3"x5  -   -- - --   Cervical Yasmin -- 5"x5 ea  sb/rot  -  --- 5"x5 ea  Rot/SB 5"x5 ea  Rot/SB 5x5" ea   Rot/SB   Scap ret -- 5"x12 5"x10 5"x10  10 x 5" 10x5" 10x5" 10x5"   Lats -- GTB   2x10 RTB   2x15 2x15 B RTB 2 x 15 B  RTB 2 x 10 RTB 2x10 RTB - --   ER L -- HELD HELD 2x15 RTB 2x15 RTB       SL Abd -- 1# 2x15 1# 3x10 1#  2x10 2x10 1#       Rows -- Held elbow pn. HELD 2x15 B RTB 2x15 B  RTB 2 x 10 RTB 2x10 RTB - --   Horizontal Abd -- YTB 2x10  -   -- - --   Scaption -- --  -   -- - --   Wall Slides -- Next?  -    - --   Initials KV DH 5/6 DH 4/6 CS 3/6 MB 2/6 MB 1/6 KV JA 1/6 KV       Assessment:     See updated POC and " d/c summary in treatment section.    Patient Education/Response:     See updated POC and d/c summary in treatment section.    Plans and Goals:     See updated POC and d/c summary in treatment section.

## 2017-02-22 ENCOUNTER — OFFICE VISIT (OUTPATIENT)
Dept: FAMILY MEDICINE | Facility: HOSPITAL | Age: 52
End: 2017-02-22
Attending: FAMILY MEDICINE
Payer: MEDICAID

## 2017-02-22 VITALS
HEIGHT: 62 IN | SYSTOLIC BLOOD PRESSURE: 117 MMHG | BODY MASS INDEX: 24.63 KG/M2 | HEART RATE: 84 BPM | DIASTOLIC BLOOD PRESSURE: 70 MMHG | WEIGHT: 133.81 LBS

## 2017-02-22 DIAGNOSIS — I10 ESSENTIAL HYPERTENSION: Primary | ICD-10-CM

## 2017-02-22 DIAGNOSIS — M32.0 DRUG-INDUCED SYSTEMIC LUPUS ERYTHEMATOSUS, UNSPECIFIED ORGAN INVOLVEMENT STATUS: ICD-10-CM

## 2017-02-22 DIAGNOSIS — D64.9 ANEMIA, UNSPECIFIED TYPE: ICD-10-CM

## 2017-02-22 DIAGNOSIS — E78.5 HYPERLIPIDEMIA, UNSPECIFIED HYPERLIPIDEMIA TYPE: ICD-10-CM

## 2017-02-22 DIAGNOSIS — G43.009 MIGRAINE WITHOUT AURA AND WITHOUT STATUS MIGRAINOSUS, NOT INTRACTABLE: ICD-10-CM

## 2017-02-22 DIAGNOSIS — E04.2 MULTIPLE THYROID NODULES: ICD-10-CM

## 2017-02-22 DIAGNOSIS — E11.9 TYPE 2 DIABETES MELLITUS WITHOUT COMPLICATION, WITHOUT LONG-TERM CURRENT USE OF INSULIN: ICD-10-CM

## 2017-02-22 PROCEDURE — 99214 OFFICE O/P EST MOD 30 MIN: CPT | Performed by: FAMILY MEDICINE

## 2017-02-22 RX ORDER — SIMVASTATIN 40 MG/1
40 TABLET, FILM COATED ORAL DAILY
Qty: 60 TABLET | Refills: 3 | Status: SHIPPED | OUTPATIENT
Start: 2017-02-22 | End: 2017-04-10 | Stop reason: SDUPTHER

## 2017-02-22 RX ORDER — FERROUS SULFATE 325(65) MG
325 TABLET ORAL 2 TIMES DAILY
Qty: 60 TABLET | Refills: 2 | Status: SHIPPED | OUTPATIENT
Start: 2017-02-22 | End: 2017-05-21 | Stop reason: SDUPTHER

## 2017-02-22 RX ORDER — METFORMIN HYDROCHLORIDE 1000 MG/1
1000 TABLET ORAL 2 TIMES DAILY
Qty: 120 TABLET | Refills: 2 | Status: SHIPPED | OUTPATIENT
Start: 2017-02-22 | End: 2017-07-07 | Stop reason: SDUPTHER

## 2017-02-22 RX ORDER — GLIPIZIDE 5 MG/1
10 TABLET ORAL DAILY
Qty: 60 TABLET | Refills: 3 | Status: SHIPPED | OUTPATIENT
Start: 2017-02-22 | End: 2017-04-10 | Stop reason: SDUPTHER

## 2017-02-22 RX ORDER — BUTALBITAL, ACETAMINOPHEN AND CAFFEINE 50; 325; 40 MG/1; MG/1; MG/1
1 TABLET ORAL DAILY PRN
Qty: 20 TABLET | Refills: 0 | Status: SHIPPED | OUTPATIENT
Start: 2017-02-22 | End: 2017-07-07 | Stop reason: SDUPTHER

## 2017-02-22 RX ORDER — PROPRANOLOL HYDROCHLORIDE 40 MG/1
40 TABLET ORAL 3 TIMES DAILY
Qty: 90 TABLET | Refills: 2 | Status: SHIPPED | OUTPATIENT
Start: 2017-02-22 | End: 2017-04-10

## 2017-02-22 NOTE — PROGRESS NOTES
Subjective:       Patient ID: Aggie Rasheed is a 51 y.o. female.    Chief Complaint: Follow-up (New dx of anemia)    HPI   Pt is a 50 yo F with hx of Protein C def on coumadin, HTN, DM, thyroid nodules and Lupus who presents to the clinic for a follow up. Since her last visit last month, pt underwent FNA on R thyroid which was found to be benign. Pt also was seen by Rheumatology for Lupus and started back on plaquenil. She was told to follow up with her PCP after she was found to be anemic. Further pt states that she is taking 2.5 mg QD of coumadin and wishes to have her INR checked as it has not been checked since she resumed her med after the FNA. Pt states that she has been well overall aside from some on and off pain in her R hip.      Review of Systems   Constitutional: Negative for chills, fatigue and fever.   HENT: Negative for sneezing and sore throat.    Eyes: Negative for pain.   Respiratory: Negative for cough, chest tightness, shortness of breath and wheezing.    Cardiovascular: Negative for chest pain and palpitations.   Gastrointestinal: Negative for abdominal pain, constipation, diarrhea, nausea and vomiting.   Genitourinary: Negative for dysuria.   Musculoskeletal: Negative for back pain.   Skin: Negative for rash.   Neurological: Negative for headaches.   Psychiatric/Behavioral: Negative for behavioral problems.       Objective:      Vitals:    02/22/17 1154   BP: 117/70   Pulse: 84     Physical Exam   Constitutional: She is oriented to person, place, and time. She appears well-developed and well-nourished.   HENT:   Head: Normocephalic and atraumatic.   Eyes: Conjunctivae are normal. Pupils are equal, round, and reactive to light.   Neck: Normal range of motion.   Cardiovascular: Normal rate, normal heart sounds and intact distal pulses.    Pulmonary/Chest: Effort normal and breath sounds normal. She has no wheezes. She exhibits no tenderness.   Abdominal: Soft. Bowel sounds are normal. There is  no tenderness.   Neurological: She is alert and oriented to person, place, and time.   Skin: Skin is warm.   Psychiatric: She has a normal mood and affect.       Assessment:       1. Essential hypertension    2. Migraine without aura and without status migrainosus, not intractable    3. Drug-induced systemic lupus erythematosus, unspecified organ involvement status    4. Multiple thyroid nodules    5. Hyperlipidemia, unspecified hyperlipidemia type    6. Type 2 diabetes mellitus without complication, without long-term current use of insulin    7. Anemia, unspecified type        Plan:       Essential hypertension  -     propranolol (INDERAL) 40 MG tablet; Take 1 tablet (40 mg total) by mouth 3 (three) times daily.  Dispense: 90 tablet; Refill: 2  - Well controlled at 117/70 this visit  - Cont regimen    Migraine without aura and without status migrainosus, not intractable  -     butalbital-acetaminophen-caffeine -40 mg (FIORICET, ESGIC) -40 mg per tablet; Take 1 tablet by mouth daily as needed.  Dispense: 20 tablet; Refill: 0  - Pt advised that she will cont to be weaned off     Drug-induced systemic lupus erythematosus, unspecified organ involvement status  Pt seen by [facility] Rheum on 1/31/17 where she was started on Plaquenil    Multiple thyroid nodules   - Recent FNA done which was benign    Hyperlipidemia, unspecified hyperlipidemia type  -     simvastatin (ZOCOR) 40 MG tablet; Take 1 tablet (40 mg total) by mouth once daily.  Dispense: 60 tablet; Refill: 3    Type 2 diabetes mellitus without complication, without long-term current use of insulin  -     Ambulatory referral to Podiatry  -     metformin (GLUCOPHAGE) 1000 MG tablet; Take 1 tablet (1,000 mg total) by mouth 2 (two) times daily.  Dispense: 120 tablet; Refill: 2  -     glipiZIDE (GLUCOTROL) 5 MG tablet; Take 2 tablets (10 mg total) by mouth once daily.  Dispense: 60 tablet; Refill: 3  - Last A1c at 6.9 in 12/16, will plan for repeat next  month    Anemia, unspecified type  -     Protime-INR; Future; Expected date: 2/22/17  -     ferrous sulfate 325 mg (65 mg iron) Tab tablet; Take 1 tablet (325 mg total) by mouth 2 (two) times daily.  Dispense: 60 tablet; Refill: 2  -     Iron and TIBC; Future; Expected date: 2/22/17  -     Ferritin; Future; Expected date: 2/22/17  -     Transferrin; Future; Expected date: 2/22/17  - CBC done by Rheumatology shows H&H at 8.9/28.9, previous done in 11/16 at 10.5/35.4  - Will start iron pills and pt has a GI doctor already. Advised pt that she needs to get set up for a colonoscopy      Return in about 1 month (around 3/22/2017).

## 2017-02-23 ENCOUNTER — TELEPHONE (OUTPATIENT)
Dept: FAMILY MEDICINE | Facility: HOSPITAL | Age: 52
End: 2017-02-23

## 2017-02-23 NOTE — PROGRESS NOTES
Left message for updating pt on recent labs. INR at 2.0 now.  Will switch pt to Xarelto and stop coumadin now. Prescription sent. Also. Iron studies consistent with likely JN. Continue supplementation. Will continue to monitor.     Juan Chavez

## 2017-02-23 NOTE — TELEPHONE ENCOUNTER
----- Message from Silvana Han sent at 2/23/2017  3:59 PM CST -----  Pt was seen by dr muñoz yesterday , he prescribe a medication for blood thinner, rivaroxaban (XARELTO) 20 mg Tab this med is not covered by her ins, she need a different medication please call the pt .

## 2017-02-23 NOTE — PROGRESS NOTES
I assume primary medical responsibility for this patient, I have reviewed the case history, findings, diagnosis and treatment plan with the resident and agree that the care is reasonable and necessary. This service has been performed by a resident without the presence of a teaching physician under the primary care exception  Olive Childs  2/23/2017

## 2017-03-15 LAB — POCT GLUCOSE: 74 MG/DL (ref 70–110)

## 2017-04-10 ENCOUNTER — OFFICE VISIT (OUTPATIENT)
Dept: FAMILY MEDICINE | Facility: HOSPITAL | Age: 52
End: 2017-04-10
Attending: FAMILY MEDICINE
Payer: MEDICAID

## 2017-04-10 VITALS
WEIGHT: 137.38 LBS | HEIGHT: 62 IN | DIASTOLIC BLOOD PRESSURE: 90 MMHG | SYSTOLIC BLOOD PRESSURE: 167 MMHG | HEART RATE: 80 BPM | BODY MASS INDEX: 25.28 KG/M2

## 2017-04-10 DIAGNOSIS — E78.5 HYPERLIPIDEMIA, UNSPECIFIED HYPERLIPIDEMIA TYPE: ICD-10-CM

## 2017-04-10 DIAGNOSIS — Z12.11 COLON CANCER SCREENING: Primary | ICD-10-CM

## 2017-04-10 DIAGNOSIS — D68.59 PROTEIN C DEFICIENCY: ICD-10-CM

## 2017-04-10 DIAGNOSIS — M32.9 SYSTEMIC LUPUS ERYTHEMATOSUS, UNSPECIFIED SLE TYPE, UNSPECIFIED ORGAN INVOLVEMENT STATUS: ICD-10-CM

## 2017-04-10 DIAGNOSIS — M25.551 RIGHT HIP PAIN: ICD-10-CM

## 2017-04-10 DIAGNOSIS — E11.9 TYPE 2 DIABETES MELLITUS WITHOUT OPHTHALMIC MANIFESTATIONS: ICD-10-CM

## 2017-04-10 DIAGNOSIS — E11.9 TYPE 2 DIABETES MELLITUS WITHOUT COMPLICATION, WITHOUT LONG-TERM CURRENT USE OF INSULIN: ICD-10-CM

## 2017-04-10 DIAGNOSIS — I10 ESSENTIAL HYPERTENSION: ICD-10-CM

## 2017-04-10 DIAGNOSIS — D50.9 IRON DEFICIENCY ANEMIA, UNSPECIFIED IRON DEFICIENCY ANEMIA TYPE: ICD-10-CM

## 2017-04-10 DIAGNOSIS — G43.009 MIGRAINE WITHOUT AURA AND WITHOUT STATUS MIGRAINOSUS, NOT INTRACTABLE: ICD-10-CM

## 2017-04-10 PROBLEM — R53.1 DECREASED STRENGTH: Status: RESOLVED | Noted: 2017-01-10 | Resolved: 2017-04-10

## 2017-04-10 PROBLEM — M79.602 PAIN OF LEFT UPPER EXTREMITY: Status: RESOLVED | Noted: 2017-01-10 | Resolved: 2017-04-10

## 2017-04-10 PROBLEM — M54.2 NECK PAIN ON LEFT SIDE: Status: RESOLVED | Noted: 2017-01-10 | Resolved: 2017-04-10

## 2017-04-10 PROBLEM — M25.60 DECREASED RANGE OF MOTION: Status: RESOLVED | Noted: 2017-01-10 | Resolved: 2017-04-10

## 2017-04-10 PROCEDURE — 99214 OFFICE O/P EST MOD 30 MIN: CPT | Performed by: FAMILY MEDICINE

## 2017-04-10 RX ORDER — SIMVASTATIN 40 MG/1
40 TABLET, FILM COATED ORAL DAILY
Qty: 60 TABLET | Refills: 3 | Status: SHIPPED | OUTPATIENT
Start: 2017-04-10 | End: 2017-07-07 | Stop reason: SDUPTHER

## 2017-04-10 RX ORDER — GLIPIZIDE 5 MG/1
10 TABLET ORAL DAILY
Qty: 60 TABLET | Refills: 3 | Status: SHIPPED | OUTPATIENT
Start: 2017-04-10 | End: 2017-07-07 | Stop reason: SDUPTHER

## 2017-04-10 RX ORDER — WARFARIN 2.5 MG/1
TABLET ORAL
Refills: 3 | COMMUNITY
Start: 2017-03-18 | End: 2017-04-24 | Stop reason: ALTCHOICE

## 2017-04-10 RX ORDER — PROPRANOLOL HYDROCHLORIDE 40 MG/1
40 TABLET ORAL 2 TIMES DAILY
Qty: 60 TABLET | Refills: 3 | Status: SHIPPED | OUTPATIENT
Start: 2017-04-10 | End: 2017-07-07 | Stop reason: SDUPTHER

## 2017-04-10 RX ORDER — GABAPENTIN 600 MG/1
1200 TABLET ORAL 3 TIMES DAILY
Qty: 180 TABLET | Refills: 3 | Status: SHIPPED | OUTPATIENT
Start: 2017-04-10 | End: 2017-07-07 | Stop reason: SDUPTHER

## 2017-04-10 RX ORDER — PROPRANOLOL HYDROCHLORIDE 40 MG/1
TABLET ORAL
COMMUNITY
Start: 2017-03-10 | End: 2017-04-10 | Stop reason: SDUPTHER

## 2017-04-10 NOTE — PROGRESS NOTES
I assume primary medical responsibility for this patient, I have reviewed the case history, findings, diagnosis and treatment plan with the resident and agree that the care is reasonable and necessary. This service has been performed by a resident without the presence of a teaching physician under the primary care exception  Olive Childs  4/10/2017

## 2017-04-10 NOTE — PROGRESS NOTES
"Subjective:       Patient ID: Aggie Rasheed is a 51 y.o. female.    Chief Complaint: Follow-up    HPI   Pt is a 52 yo F with hx of Protein C def, DM, Lupus, HTN and GERD who presents to the clinic for a follow up. Pt was last seen in clinic in 2/17 and was planned to switch to xarelto but since then has not be able to do so due to insurance coverage. Pt has been taking coumadin 2.5 mg QD but has not checked her INR.Today, she complains of worsening R hip pain which has been an issues for many "months." She has tried tylenol but that does not seem to help much. Denies any trauma. She states that the pain radiates down her R leg.     Review of Systems   Constitutional: Negative for chills, fatigue and fever.   HENT: Negative for sneezing and sore throat.    Eyes: Negative for pain.   Respiratory: Negative for cough, chest tightness, shortness of breath and wheezing.    Cardiovascular: Negative for chest pain and palpitations.   Gastrointestinal: Negative for abdominal pain, constipation, diarrhea, nausea and vomiting.   Genitourinary: Negative for dysuria.   Musculoskeletal: Negative for back pain.        R hip pain.    Skin: Negative for rash.   Neurological: Negative for headaches.   Psychiatric/Behavioral: Negative for behavioral problems.       Objective:      Vitals:    04/10/17 0948   BP: (!) 167/90   Pulse: 80     Physical Exam   Constitutional: She is oriented to person, place, and time. She appears well-developed and well-nourished.   HENT:   Head: Normocephalic and atraumatic.   Eyes: Conjunctivae are normal. Pupils are equal, round, and reactive to light.   Neck: Normal range of motion.   Cardiovascular: Normal rate, normal heart sounds and intact distal pulses.    Pulmonary/Chest: Effort normal and breath sounds normal. She has no wheezes. She exhibits no tenderness.   Abdominal: Soft. Bowel sounds are normal. There is no tenderness.   Musculoskeletal:   5/5 strength LE b/l. R hip FROM but pain elicited " on hip abduction.    Neurological: She is alert and oriented to person, place, and time.   Skin: Skin is warm.   Psychiatric: She has a normal mood and affect.       Assessment:       1. Colon cancer screening    2. Protein C deficiency    3. Right hip pain    4. Iron deficiency anemia, unspecified iron deficiency anemia type    5. Type 2 diabetes mellitus without ophthalmic manifestations    6. Type 2 diabetes mellitus without complication, without long-term current use of insulin    7. Hyperlipidemia, unspecified hyperlipidemia type    8. Migraine without aura and without status migrainosus, not intractable    9. Essential hypertension    10. Systemic lupus erythematosus, unspecified SLE type, unspecified organ involvement status        Plan:       Colon cancer screening  -     Ambulatory referral to Gastroenterology    Protein C deficiency  -     Protime-INR; Future; Expected date: 4/10/17  - Depending on level, will plan to call [facility] to discuss coverage options with new agents    Right hip pain  -     Ambulatory Referral to Physical/Occupational Therapy    Iron deficiency anemia, unspecified iron deficiency anemia type  - Cont iron replacement for now, will plan to repeat labs in 6/17  - Ref made for screening colonoscopy    Type 2 diabetes mellitus without ophthalmic manifestations  -     gabapentin (NEURONTIN) 600 MG tablet; Take 2 tablets (1,200 mg total) by mouth 3 (three) times daily.  Dispense: 180 tablet; Refill: 3  -     glipiZIDE (GLUCOTROL) 5 MG tablet; Take 2 tablets (10 mg total) by mouth once daily.  Dispense: 60 tablet; Refill: 3    Hyperlipidemia, unspecified hyperlipidemia type  -     simvastatin (ZOCOR) 40 MG tablet; Take 1 tablet (40 mg total) by mouth once daily.  Dispense: 60 tablet; Refill: 3    Migraine without aura and without status migrainosus, not intractable  -     propranolol (INDERAL) 40 MG tablet; Take 1 tablet (40 mg total) by mouth 2 (two) times daily.  Dispense: 60  tablet; Refill: 3    Essential hypertension   - BP elevated today at 167/90   - Pt states that she infrequently checks BP at home and is around 140s sys.    - Will recheck at next visit    Systemic lupus erythematosus, unspecified SLE type, unspecified organ involvement status   - Pt currently on Plaquenil.    - Follows with [facility] Rhe[facility], follow up appt later this month per pt     Return in about 3 weeks (around 5/1/2017).

## 2017-04-11 DIAGNOSIS — D68.59 PROTEIN C DEFICIENCY: Primary | ICD-10-CM

## 2017-04-20 ENCOUNTER — LAB VISIT (OUTPATIENT)
Dept: LAB | Facility: HOSPITAL | Age: 52
End: 2017-04-20
Attending: FAMILY MEDICINE
Payer: MEDICAID

## 2017-04-20 DIAGNOSIS — D68.59 PROTEIN C DEFICIENCY: ICD-10-CM

## 2017-04-20 LAB
INR PPP: 2.2
PROTHROMBIN TIME: 23.1 SEC

## 2017-04-20 PROCEDURE — 36415 COLL VENOUS BLD VENIPUNCTURE: CPT

## 2017-04-20 PROCEDURE — 85610 PROTHROMBIN TIME: CPT

## 2017-04-21 ENCOUNTER — DOCUMENTATION ONLY (OUTPATIENT)
Dept: FAMILY MEDICINE | Facility: HOSPITAL | Age: 52
End: 2017-04-21

## 2017-04-21 NOTE — PROGRESS NOTES
INR now 2.2. Called [facility] to discuss insurance coverage for new oral anticoagulation. Pharmacy states they will fill PA for Xarelto as pt should be able to receive the medication. They stated they will call the pt and set pt up with prescription.     Juan Chavez

## 2017-04-24 ENCOUNTER — OFFICE VISIT (OUTPATIENT)
Dept: FAMILY MEDICINE | Facility: HOSPITAL | Age: 52
End: 2017-04-24
Attending: FAMILY MEDICINE
Payer: MEDICAID

## 2017-04-24 VITALS
SYSTOLIC BLOOD PRESSURE: 179 MMHG | HEIGHT: 62 IN | BODY MASS INDEX: 24.26 KG/M2 | DIASTOLIC BLOOD PRESSURE: 86 MMHG | WEIGHT: 131.81 LBS | HEART RATE: 76 BPM

## 2017-04-24 DIAGNOSIS — E11.9 TYPE 2 DIABETES MELLITUS WITHOUT OPHTHALMIC MANIFESTATIONS: ICD-10-CM

## 2017-04-24 DIAGNOSIS — D68.59 PROTEIN C DEFICIENCY: Primary | ICD-10-CM

## 2017-04-24 DIAGNOSIS — I10 ESSENTIAL HYPERTENSION: ICD-10-CM

## 2017-04-24 DIAGNOSIS — J06.9 VIRAL UPPER RESPIRATORY TRACT INFECTION: ICD-10-CM

## 2017-04-24 PROCEDURE — 99213 OFFICE O/P EST LOW 20 MIN: CPT | Performed by: FAMILY MEDICINE

## 2017-04-24 RX ORDER — CLONIDINE HYDROCHLORIDE 0.2 MG/1
0.6 TABLET ORAL 2 TIMES DAILY
Qty: 180 TABLET | Refills: 1 | Status: SHIPPED | OUTPATIENT
Start: 2017-04-24 | End: 2017-07-07 | Stop reason: SDUPTHER

## 2017-04-24 RX ORDER — BENZONATATE 100 MG/1
100 CAPSULE ORAL 3 TIMES DAILY PRN
Qty: 30 CAPSULE | Refills: 0 | Status: SHIPPED | OUTPATIENT
Start: 2017-04-24 | End: 2017-05-04

## 2017-04-24 RX ORDER — ALBUTEROL SULFATE 90 UG/1
2 AEROSOL, METERED RESPIRATORY (INHALATION) EVERY 6 HOURS PRN
Qty: 18 G | Refills: 0 | Status: SHIPPED | OUTPATIENT
Start: 2017-04-24 | End: 2017-05-17

## 2017-04-24 NOTE — PROGRESS NOTES
I assume primary medical responsibility for this patient, I have reviewed the case history, findings, diagnosis and treatment plan with the resident and agree that the care is reasonable and necessary. This service has been performed by a resident without the presence of a teaching physician under the primary care exception  Olive Childs  4/24/2017

## 2017-04-24 NOTE — PROGRESS NOTES
Subjective:       Patient ID: Aggie Rasheed is a 51 y.o. female.    Chief Complaint: Follow-up    HPI   Pt is a 50 yo F with hx of Protein C def, DM, Lupus, HTN and GERD who presents to the clinic for a follow up. Pt was seen on 4/10/17 when she was told to follow up with a INR. INR returned back at 2.2 last week and was advised to  Xarelto ([facility]) and stop Coumadin due to fluctuating INR levels. She states that she just picked up the Xarelto prior to clinic today and will start the med today. Today, she also complains of a dry cough which she has had since 5 days. She denies any f/c and sob. Pt states that she has been taking OTC cough med without much improvement.     Review of Systems   Constitutional: Negative for chills, fatigue and fever.   HENT: Negative for sneezing and sore throat.    Eyes: Negative for pain.   Respiratory: Positive for cough. Negative for chest tightness, shortness of breath and wheezing.    Cardiovascular: Negative for chest pain and palpitations.   Gastrointestinal: Negative for abdominal pain, constipation, diarrhea, nausea and vomiting.   Genitourinary: Negative for dysuria.   Musculoskeletal: Negative for back pain.   Skin: Negative for rash.   Neurological: Negative for headaches.   Psychiatric/Behavioral: Negative for behavioral problems.       Objective:      Vitals:    04/24/17 1037   BP: (!) 179/86   Pulse: 76     Physical Exam   Constitutional: She is oriented to person, place, and time. She appears well-developed and well-nourished.   HENT:   Head: Normocephalic and atraumatic.   Eyes: Conjunctivae are normal. Pupils are equal, round, and reactive to light.   Neck: Normal range of motion.   Cardiovascular: Normal rate, normal heart sounds and intact distal pulses.    Pulmonary/Chest: Effort normal. She has no wheezes. She exhibits no tenderness.   R sided exp wheezing in upper and lower lung zones.    Abdominal: Soft. Bowel sounds are normal. There is no  tenderness.   Neurological: She is alert and oriented to person, place, and time.   Skin: Skin is warm.   Psychiatric: She has a normal mood and affect.       Assessment:       1. Protein C deficiency    2. Type 2 diabetes mellitus without ophthalmic manifestations    3. Essential hypertension    4. Viral upper respiratory tract infection        Plan:       Protein C deficiency  -     rivaroxaban (XARELTO) 15 mg Tab; Take 1 tablet (15 mg total) by mouth 2 (two) times daily with meals.  Dispense: 42 tablet; Refill: 0  -     rivaroxaban (XARELTO) 20 mg Tab; Take 1 tablet (20 mg total) by mouth daily with dinner or evening meal.  Dispense: 30 tablet; Refill: 8  - Pt advised to stop Coumadin due to fluctuating INR levels.     Type 2 diabetes mellitus without ophthalmic manifestations   - Last A1c at 6.9 in 12/16, will repeat in 6/17   - Cont current regimen  Essential hypertension   - BP at 179/86, pt states that she had her BP read while she was coughing, Repeat at 144/76. Will cont to monitor. Cont current regimen.   -     cloNIDine (CATAPRES) 0.2 MG tablet; Take 3 tablets (0.6 mg total) by mouth 2 (two) times daily.  Dispense: 180 tablet; Refill: 1    Viral upper respiratory tract infection  -     benzonatate (TESSALON) 100 MG capsule; Take 1 capsule (100 mg total) by mouth 3 (three) times daily as needed for Cough.  Dispense: 30 capsule; Refill: 0  -     albuterol 90 mcg/actuation inhaler; Inhale 2 puffs into the lungs every 6 (six) hours as needed for Wheezing. Rescue  Dispense: 18 g; Refill: 0   - Cough OTC meds and proper hydration advised.     Return in about 2 months (around 6/24/2017).

## 2017-04-25 ENCOUNTER — DOCUMENTATION ONLY (OUTPATIENT)
Dept: PHARMACY | Facility: CLINIC | Age: 52
End: 2017-04-25

## 2017-04-25 NOTE — PROGRESS NOTES
PA initiated on 4/21/2017 at [facility] for Xarelto for patient. PA approved and medication picked up by patient 4/21/2017.

## 2017-05-10 ENCOUNTER — CLINICAL SUPPORT (OUTPATIENT)
Dept: REHABILITATION | Facility: HOSPITAL | Age: 52
End: 2017-05-10
Attending: FAMILY MEDICINE
Payer: MEDICAID

## 2017-05-10 DIAGNOSIS — M25.551 PAIN OF RIGHT HIP JOINT: Primary | ICD-10-CM

## 2017-05-10 PROCEDURE — 97161 PT EVAL LOW COMPLEX 20 MIN: CPT | Mod: PN

## 2017-05-10 NOTE — PROGRESS NOTES
TIME RECORD    Date: 05/10/2017    Start Time:  1500  Stop Time:  1545    PROCEDURES:    TIMED  Procedure Min.   TE 6                     UNTIMED  Procedure Min.   eval 30         Total Timed Minutes:  6  Total Timed Units:  0  Total Untimed Units:  1  Charges Billed/# of units:  1 eval    OUTPATIENT PHYSICAL THERAPY   PATIENT EVALUATION  Onset Date: March 2017  Primary Diagnosis:   1. Pain of right hip joint       Treatment Diagnosis: R hip pain  Past Medical History:   Diagnosis Date    Diabetes mellitus type II     Headache associated with hormonal factors     Hyperlipidemia     Hypertension     Lupus     Protein C deficiency      Precautions: standard  Prior Therapy: PT for neck pain at beginning of 2017  Medications: Aggie Rasheed has a current medication list which includes the following prescription(s): albuterol, butalbital-acetaminophen-caffeine -40 mg, clonidine, ferrous sulfate, gabapentin, glipizide, hydroxychloroquine, losartan, metformin, omeprazole magnesium, propranolol, rivaroxaban, rivaroxaban, and simvastatin.  Nutrition:  Normal  History of Present Illness: R hip pain  Prior Level of Function: Independent  Social History: Pt does not work, doesn't get a lot of activity, however does run errands and helps out with 5 nieces/nephews.    Place of Residence (Steps/Adaptations): 1 story, no steps; however stairs bother B knees with descending.  Functional Deficits Leading to Referral/Nature of Injury: unknown  Patient Therapy Goals: To strengthen B LEs in order to decrease R hip pain.    Subjective     Aggie Rasheed states that she has R hip pain and also has B knee pain.  Reports that pain started a couple of months ago.  First thought that she pulled a muscle in her thigh.  Then went to MD and found out it was coming from her hip.  States it hurts more when she walks. Isn't sure if she has more pain in the morning.  States that hip feels sore the following day after walking a lot.   No imaging taken.  Pt does report that she has Lupus and arthritis.      Pain:  Location: R hip and B knees   Description: deep   Activities Which Increase Pain: Walking  Activities Which Decrease Pain: sitting down, lying down  Pain Scale: 0/10 at best 2/10 now  8/10 at worst    Objective     Posture: slouched seated posture  Palpation: no TTP  Hip Range of Motion: WNL but painful at end range with hip IR/ER and abduction    Lower Extremity Strength  Right LE  Left LE    Quadriceps: 4-/5 Quadriceps: 4/5   Hamstrings: 5/5 Hamstrings: 5/5   Hip flexion: 4-/5 Hip flexion (supine): 4+/5   Hip extension:  4-/5 with R hi/back pain Hip extension: 4-/5   PGM: 3/5 with R hip pain PGM:  3+/5   Hip ER:  3+/5 Hip ER:  4/5   Hip IR: 4-/5 Hip IR: 4/5     Flexibility: B mod HS tightness, B mod piriformis tightness  Gait: Without AD  Bed Mobility:Independent  Transfers: Independent  Special Tests:   MIS: positive from 9 to 3 on the R  FABERS: positive for posterior hip pain    Other: long axis distraction - min hypomobility on the R  Treatment: piriformis stretch, HSS with strap, pelvic tilt, SL clams, bridges    Assessment     Initial Assessment: This is a 51 y.o. female referred to outpatient physical therapy and presents with a medical diagnosis of R hip pain.  Pt's c/c is R hip pain with walking.  Pt presents with decreased LE strength, decreased flexibility, and decreased joint mobility. Pt will benefit from physcial therapy services, specifically core/LE strengthening, joint mobs, and stretching, in order to maximize pain free and/or functional use of right hip. The following goals were discussed with the patient and patient is in agreement with them as to be addressed in the treatment plan. Pt was given a HEP consisting of exercises listed above. Pt verbally understood the instructions as they were given and demonstrated proper form and technique during therapy. Pt was advise to perform these exercises free of pain, and to  stop performing them if pain occurs.       History  Co-morbidities and personal factors that may impact the plan of care Examination  Body Structures and Functions, activity limitations and participation restrictions that may impact the plan of care Clinical Presentation   Decision Making/ Complexity Score   Co-morbidities:   Lupus  arthritis          Personal Factors:   none Body Regions:  R hip    Body Systems:   Strength, flexibility, joint mobility        Activity Limitations & Participation Restrictions:   walking     Stable and uncomplicated       PT reviewed FOTO scores for Aggie Rasheed on 5/10/17  FOTO score: 40% limited         LOW       Rehab Potiential: good  Barriers to Rehab: none  GOALS: Short Term Goals:  4 weeks  1.Report decreased R hip pain  < / =  4-5/10 at worst to increase tolerance for walking.  2. Pt will have normal hip ROM without c/o hip pain in order to perform ADLs without difficulty.  3. Increase strength by 1/3 MMT grade in R LE  to increase tolerance for ADL and work activities.  4. Pt to tolerate HEP to improve ROM and independence with ADL's    Long Term Goals: 8 weeks  1.Report decreased R hip pain < / = 1-2/10  to increase tolerance for walking.  2.Patient goal: To strengthen B LEs in order to decrease hip pain.    3.Increase strength to 4+/5 in  R LE  to increase tolerance for ADL and work activities.  4. Pt will have neg FABERs test on the R    Plan     Certification Period: 5/10/17 to 7/10/17  Recommended Treatment Plan: 2 times per week for 8 weeks: Electrical Stimulation IFC, Gait Training, Manual Therapy, Moist Heat/ Ice, Neuromuscular Re-ed, Patient Education, Therapeutic Activites and Therapeutic Exercise  Other Recommendations: modalities prn, ASTYM prn, kinesiotape prn, Functional Dry Needling prn       Therapist: Veronica Weinstein, PT    I CERTIFY THE NEED FOR THESE SERVICES FURNISHED UNDER THIS PLAN OF TREATMENT AND WHILE UNDER MY CARE    Physician's comments:  ________________________________________________________________________________________________________________________________________________      Physician's Name: ___________________________________

## 2017-05-17 ENCOUNTER — TELEPHONE (OUTPATIENT)
Dept: NEUROLOGY | Facility: HOSPITAL | Age: 52
End: 2017-05-17

## 2017-05-17 ENCOUNTER — OFFICE VISIT (OUTPATIENT)
Dept: NEUROLOGY | Facility: HOSPITAL | Age: 52
End: 2017-05-17
Attending: INTERNAL MEDICINE
Payer: MEDICAID

## 2017-05-17 VITALS
DIASTOLIC BLOOD PRESSURE: 88 MMHG | HEART RATE: 86 BPM | BODY MASS INDEX: 24.96 KG/M2 | WEIGHT: 135.63 LBS | HEIGHT: 62 IN | SYSTOLIC BLOOD PRESSURE: 167 MMHG | TEMPERATURE: 98 F

## 2017-05-17 DIAGNOSIS — D50.0 IRON DEFICIENCY ANEMIA DUE TO CHRONIC BLOOD LOSS: Primary | ICD-10-CM

## 2017-05-17 PROCEDURE — 99213 OFFICE O/P EST LOW 20 MIN: CPT | Performed by: INTERNAL MEDICINE

## 2017-05-17 RX ORDER — POLYETHYLENE GLYCOL 3350, SODIUM SULFATE ANHYDROUS, SODIUM BICARBONATE, SODIUM CHLORIDE, POTASSIUM CHLORIDE 236; 22.74; 6.74; 5.86; 2.97 G/4L; G/4L; G/4L; G/4L; G/4L
4 POWDER, FOR SOLUTION ORAL ONCE
Qty: 4000 ML | Refills: 0 | Status: SHIPPED | OUTPATIENT
Start: 2017-05-17 | End: 2017-05-17

## 2017-05-17 NOTE — PROGRESS NOTES
[facility]    CC: anemia    HPI 51 y.o. female here for initial evaluation of several months of microcytic, iron deficiency anemia associated with dark loose stool.  Pertinent medical problems include Protein C deficiency (LE DVTs, AAA thrombus s/p emergent repair) on Xarelto, DM, benign thyroid nodules, and Lupus (plaquenil).  She was noted to have a new anemia 2017 and was started on twice daily oral iron: iron studies at that time were consistent with JN.  Since around that time, she has had consistently loose, dark, tarry/sticky stool.  Chronically, she has mild constipation alternating with diarrhea.      She is a former heavy NSAID user but stopped in 2017.  She has GERD which is well controlled with a PPI.  She has post prandial abdominal discomfort, but otherwise denies other symptoms.  She had an aunt with colon cancer at age <60.  She has never had previous endoscopy.      Past Medical History:   Diagnosis Date    Diabetes mellitus type II     Headache associated with hormonal factors     Hyperlipidemia     Hypertension     Lupus     Protein C deficiency          Past Surgical History:   Procedure Laterality Date    AORTIC VALVE SURGERY       SECTION      TOE AMPUTATION      TONSILLECTOMY      WRIST FRACTURE SURGERY         Social History  Social History   Substance Use Topics    Smoking status: Current Every Day Smoker     Packs/day: 1.00     Types: Cigarettes    Smokeless tobacco: Never Used    Alcohol use No         Family History   Problem Relation Age of Onset    Hypertension Mother     Cancer Father     Diabetes Maternal Uncle     Stroke Maternal Grandfather    Aunt with colon cancer  Uncle with coagulopathy    Review of Systems  General ROS: negative for chills, fever or weight loss  Psychological ROS: negative for hallucination, depression or suicidal ideation  Ophthalmic ROS: negative for blurry vision, photophobia or eye pain  ENT ROS: negative for  "epistaxis, sore throat or rhinorrhea  Respiratory ROS: no cough, shortness of breath, or wheezing  Cardiovascular ROS: no chest pain or dyspnea on exertion  Gastrointestinal ROS: +abdominal pain, +change in bowel habits, +black stools, +diarrhea  Genito-Urinary ROS: no dysuria, trouble voiding, or hematuria  Musculoskeletal ROS: negative for gait disturbance or muscular weakness, +joint pain  Neurological ROS: no syncope or seizures; no ataxia  Dermatological ROS: negative for pruritis, rash and jaundice    Physical Examination  BP (!) 167/88 (BP Location: Left arm)  Pulse 86  Temp 97.9 °F (36.6 °C)  Ht 5' 2" (1.575 m)  Wt 61.5 kg (135 lb 9.6 oz)  BMI 24.8 kg/m2  General appearance: alert, cooperative, no distress  HENT: Normocephalic, atraumatic, neck symmetrical, no nasal discharge   Eyes: conjunctivae/corneas clear, PERRL, EOM's intact  Lungs: clear to auscultation bilaterally, no dullness to percussion bilaterally  Heart: regular rate and rhythm without rub; no displacement of the PMI   Abdomen: soft, non-tender; bowel sounds normoactive; no organomegaly  Extremities: extremities symmetric; no clubbing, cyanosis, or edema  Integument: Skin color, texture, turgor normal; no rashes; hair distrubution normal  Neurologic: Alert and oriented X 3, normal strength, normal coordination and gait  Psychiatric: no pressured speech; normal affect; no evidence of impaired cognition     Labs:    From 2/22/17:    Ferritin 8  Transferrin 378  Iron 20  TIBC 559  Iron sat 4    From 11/2016:    H/H 10.5/35.4  MCV 70  Platelets 202    Imaging:    CXR 11/2016:    No acute findings in the chest.    I have personally reviewed these images    Previous medical records reviewed     Assessment:   51 year old female with HTN, lupus, protein C deficiency with a new iron deficiency anemia and loose dark stool.  Her presentation is suspicion for chronic GI blood loss anemia     Plan:   - Hold iron therapy 5 days prior to endoscopy to " maximize her bowel prep  - I will schedule for EGD/colonoscopy on June 19 with a Nulytely prep (include examination of the distal duodenum)  - She will continue her Xarelto for endoscopy due to high   - If these tests are unrevealing, then I will consider VCE as the next step in evaluating her JN    Markos Calvert MD   [address], [address]   [place] [id number] [phone]

## 2017-05-17 NOTE — MR AVS SNAPSHOT
[facility] [address] [facility] [place] [address]  Phone: [phone]  Fax: [phone]                  Aggie Rasheed   2017 8:30 AM   Office Visit    Description:  Female : 1965   Provider:  Markos Calvert MD   Department:  [facility]           Reason for Visit     Consult           Diagnoses this Visit        Comments    Iron deficiency anemia due to chronic blood loss    -  Primary            To Do List           Goals (5 Years of Data)     None       These Medications        Disp Refills Start End    polyethylene glycol (GOLYTELY,NULYTELY) 236-22.74-6.74 -5.86 gram suspension 4000 mL 0 2017    Take 4,000 mLs (4 L total) by mouth once. - Oral    Pharmacy: [facility] - [place]  [address] AT [facility] #: [phone]         [facility]     [facility] -  Assistance  Unless otherwise directed by your provider, please contact [facility], our nurse care line that is available for  assistance.     Registered nurses in the [facility] provide: appointment scheduling, clinical advisement, health education, and other advisory services.  Call: [phone] (toll free)               Medications           Message regarding Medications     Verify the changes and/or additions to your medication regime listed below are the same as discussed with your clinician today.  If any of these changes or additions are incorrect, please notify your healthcare provider.        START taking these NEW medications        Refills    polyethylene glycol (GOLYTELY,NULYTELY) 236-22.74-6.74 -5.86 gram suspension 0    Sig: Take 4,000 mLs (4 L total) by mouth once.    Class: Normal    Route: Oral      STOP taking these medications     albuterol 90 mcg/actuation inhaler Inhale 2 puffs into the lungs every 6 (six) hours as needed for Wheezing. Rescue          "  Verify that the below list of medications is an accurate representation of the medications you are currently taking.  If none reported, the list may be blank. If incorrect, please contact your healthcare provider. Carry this list with you in case of emergency.           Current Medications     butalbital-acetaminophen-caffeine -40 mg (FIORICET, ESGIC) -40 mg per tablet Take 1 tablet by mouth daily as needed.    cloNIDine (CATAPRES) 0.2 MG tablet Take 3 tablets (0.6 mg total) by mouth 2 (two) times daily.    ferrous sulfate 325 mg (65 mg iron) Tab tablet Take 1 tablet (325 mg total) by mouth 2 (two) times daily.    gabapentin (NEURONTIN) 600 MG tablet Take 2 tablets (1,200 mg total) by mouth 3 (three) times daily.    glipiZIDE (GLUCOTROL) 5 MG tablet Take 2 tablets (10 mg total) by mouth once daily.    hydroxychloroquine (PLAQUENIL) 200 mg tablet TK 1 T PO BID    losartan (COZAAR) 25 MG tablet Take 1 tablet (25 mg total) by mouth once daily.    metformin (GLUCOPHAGE) 1000 MG tablet Take 1 tablet (1,000 mg total) by mouth 2 (two) times daily.    OMEPRAZOLE MAGNESIUM (PRILOSEC OTC ORAL) Take by mouth.    propranolol (INDERAL) 40 MG tablet Take 1 tablet (40 mg total) by mouth 2 (two) times daily.    rivaroxaban (XARELTO) 20 mg Tab Take 1 tablet (20 mg total) by mouth daily with dinner or evening meal.    simvastatin (ZOCOR) 40 MG tablet Take 1 tablet (40 mg total) by mouth once daily.    polyethylene glycol (GOLYTELY,NULYTELY) 236-22.74-6.74 -5.86 gram suspension Take 4,000 mLs (4 L total) by mouth once.           Clinical Reference Information           Your Vitals Were     BP Pulse Temp Height Weight BMI    167/88 (BP Location: Left arm) 86 97.9 °F (36.6 °C) 5' 2" (1.575 m) 61.5 kg (135 lb 9.6 oz) 24.8 kg/m2      Blood Pressure          Most Recent Value    BP  (!)  167/88      Allergies as of 5/17/2017     Niacin Preparations    Codeine      Immunizations Administered on Date of Encounter - 5/17/2017  "    None      Orders Placed During Today's Visit      Normal Orders This Visit    Case request GI: ESOPHAGOGASTRODUODENOSCOPY (EGD), COLONOSCOPY       Instructions    Nulytely Colonoscopy Prep Instructions    [facility] - [address], [place] [address]  [phone]      PATIENT NAME:  Aggie Rasheed                                                         PROCEDURE DAY: Monday June 19, 2017     *Your procedure time many change.  The hospital will contact you the day prior to   the procedure to confirm your procedure time and arrival.       CLEAR LIQUID DIET (START THE DAY BEFORE PROCEDURE): Sunday       Clear Liquid Diet means any liquid from the list below that is not red or purple in color:   Gatorade, Frankie-Aid, Lemonade (Yellow ONLY)-Gatorade is the preferred liquid   Tea (no milk or dairy)   Carbonated beverages (soft drink), regular or diet   Apple juice, white grape juice, white cranberry juice   Jell-O (orange, lemon, or lime flavors ONLY)   Clear, fat-free, beef or chicken broth   Bouillon, clear consommé   Snowball, Popsicles (NOT red or purple)  * No Solid Food or Alcohol     ITEMS TO BE PURCHASED FOR PREP (Nu-Lytely requires a prescription):         Nu-Lytely preparation solution.          Gas tablets (Gas-X, Mylanta Gas, Simethicone)    BOWEL PREP INSTRUCTIONS THE DAY BEFORE THE EXAM: Sunday   1. Drink only clear liquids (see the above diet) all day. Gatorade is the best liquid.   Drink an extra 8 ounces of clear liquid every hour from 11am to 5pm.         2.   At 6pm, mix Nu-Lytely powder according to the directions on the container and               drink 8 ounces of solution every 10 minutes until about half of the solution is                consumed. Place the remainder of the solution in the refrigerator.         3.   At 9pm, take two gas tablets with 8 ounces of clear liquid.        4.   At 10pm, take two gas tablets with 8 ounces of clear liquid.      THE  DAY OF THE EXAM: Monday         1.  Beginning 5 hours before your procedure time, drink the remaining half of              Nu-Lytely solution. Drink 8 ounces of solution every 10 minutes until the solution              is gone.              *If your procedure is scheduled for the early morning, you will need to get up in               the middle of the night to take this dose of preparation. The correct timing of this               dose is essential to an effective preparation. If you do not take this dose, your               exam may be incomplete and need to be repeated.         2.  Have nothing to eat or drink for 3 hours before the procedure.         3.  Take your morning medications, if any, with a small sip of water.         4.  Bring someone to drive you home (you should not drive for 12 hrs after the exam)        5.  Report to Admitting, 1st floor hospital entrance 2 hours before procedure time.       If you are diabetic, do not take insulin or oral medications the morning of the procedure. Take only a half dose of insulin the day before your procedure. Do not take your diabetic pills the day of your procedure               Colonoscopy is used to view the inside of your lower digestive tract (colon and rectum). It can help screen for colon cancer and can also help find the source of abdominal pain, bleeding, and changes in bowel habits. The test is usually done in the hospital on an outpatient basis. During the exam, the doctor can remove a small tissue sample ( a biopsy) for testing. Small growths, such as polyps, may also be removed during colonoscopy.     A camera attached to a flexible tube with a viewing lens is used to take video pictures.    Getting Ready    Be sure to tell your doctor about any medications you take. Also tell your doctor about any health conditions you may have.   Discuss the risks of the test with your doctor. These include bleeding and bowel puncture.   Your rectum and colon  must be empty for the test. So be sure to follow the diet and bowel prep instructions exactly. If you dont, the test may need to be rescheduled.   You will need to have a friend or family member prepared to drive you home after the test.     Colonoscopy provides an inside view of the entire colon.    During the Test    You are given sedating (relaxing) medication through an IV line. You may be drowsy or completely asleep.   The procedure takes 30 minutes or longer.   The doctor performs a digital rectal exam to check for anal and rectal problems. The rectum is lubricated and the scope inserted.   If you are awake, you may have a feeling similar to needing to have a bowel movement. You may also feel pressure as air is pumped into the colon. Its okay to pass gas during the procedure.  After the Test    You may discuss the results with your doctor right away or at a future visit.   Try to pass all the gas right after the test to help prevent bloating and cramping.   After the test, you can go back to your normal eating and other activities.  Risks and Possible Complications Include:   Bleeding  A puncture or tear in the colon  Risks of anesthesia                Smoking Cessation     If you would like to quit smoking:   You may be eligible for free services if you are a [place] resident and started smoking cigarettes before September 1, 1988.  Call the Smoking Cessation [employer] ([facility]) toll free at (719) 522-1050 or (124) 685-8986.   Call 1-800-QUIT-NOW if you do not meet the above criteria.   Contact us via email: [email or web address]   View our website for more information: [email or web address]        Language Assistance Services     ATTENTION: Language assistance services are available, free of charge. Please call [phone].      ATENCIÓN: Si habla español, tiene a santana disposición servicios gratuitos de asistencia lingüística. Llame al [phone].     [facility] Ý: N?u b?n nói Ti?ng Vi?t, có  các d?ch v? h? tr? ngôn ng? mi?n phí dành cho b?n. G?i s? [phone].         [facility] complies with applicable Federal civil rights laws and does not discriminate on the basis of race, color, national origin, age, disability, or sex.

## 2017-05-17 NOTE — PATIENT INSTRUCTIONS
Nulytely Colonoscopy Prep Instructions    [facility] - [address], [place] [address]  [phone]      PATIENT NAME:  Aggie Rasheed                                                         PROCEDURE DAY: Monday June 19, 2017     *Your procedure time many change.  The hospital will contact you the day prior to   the procedure to confirm your procedure time and arrival.       CLEAR LIQUID DIET (START THE DAY BEFORE PROCEDURE): Sunday       Clear Liquid Diet means any liquid from the list below that is not red or purple in color:   Gatorade, Frankie-Aid, Lemonade (Yellow ONLY)-Gatorade is the preferred liquid   Tea (no milk or dairy)   Carbonated beverages (soft drink), regular or diet   Apple juice, white grape juice, white cranberry juice   Jell-O (orange, lemon, or lime flavors ONLY)   Clear, fat-free, beef or chicken broth   Bouillon, clear consommé   Snowball, Popsicles (NOT red or purple)  * No Solid Food or Alcohol     ITEMS TO BE PURCHASED FOR PREP (Nu-Lytely requires a prescription):         Nu-Lytely preparation solution.          Gas tablets (Gas-X, Mylanta Gas, Simethicone)    BOWEL PREP INSTRUCTIONS THE DAY BEFORE THE EXAM: Sunday   1. Drink only clear liquids (see the above diet) all day. Gatorade is the best liquid.   Drink an extra 8 ounces of clear liquid every hour from 11am to 5pm.         2.   At 6pm, mix Nu-Lytely powder according to the directions on the container and               drink 8 ounces of solution every 10 minutes until about half of the solution is                consumed. Place the remainder of the solution in the refrigerator.         3.   At 9pm, take two gas tablets with 8 ounces of clear liquid.        4.   At 10pm, take two gas tablets with 8 ounces of clear liquid.      THE DAY OF THE EXAM: Monday         1.  Beginning 5 hours before your procedure time, drink the remaining half of              Nu-Lytely solution. Drink 8 ounces of  solution every 10 minutes until the solution              is gone.              *If your procedure is scheduled for the early morning, you will need to get up in               the middle of the night to take this dose of preparation. The correct timing of this               dose is essential to an effective preparation. If you do not take this dose, your               exam may be incomplete and need to be repeated.         2.  Have nothing to eat or drink for 3 hours before the procedure.         3.  Take your morning medications, if any, with a small sip of water.         4.  Bring someone to drive you home (you should not drive for 12 hrs after the exam)        5.  Report to Admitting, 1st floor hospital entrance 2 hours before procedure time.       If you are diabetic, do not take insulin or oral medications the morning of the procedure. Take only a half dose of insulin the day before your procedure. Do not take your diabetic pills the day of your procedure               Colonoscopy is used to view the inside of your lower digestive tract (colon and rectum). It can help screen for colon cancer and can also help find the source of abdominal pain, bleeding, and changes in bowel habits. The test is usually done in the hospital on an outpatient basis. During the exam, the doctor can remove a small tissue sample ( a biopsy) for testing. Small growths, such as polyps, may also be removed during colonoscopy.     A camera attached to a flexible tube with a viewing lens is used to take video pictures.    Getting Ready    Be sure to tell your doctor about any medications you take. Also tell your doctor about any health conditions you may have.   Discuss the risks of the test with your doctor. These include bleeding and bowel puncture.   Your rectum and colon must be empty for the test. So be sure to follow the diet and bowel prep instructions exactly. If you dont, the test may need to be rescheduled.   You will need  to have a friend or family member prepared to drive you home after the test.     Colonoscopy provides an inside view of the entire colon.    During the Test    You are given sedating (relaxing) medication through an IV line. You may be drowsy or completely asleep.   The procedure takes 30 minutes or longer.   The doctor performs a digital rectal exam to check for anal and rectal problems. The rectum is lubricated and the scope inserted.   If you are awake, you may have a feeling similar to needing to have a bowel movement. You may also feel pressure as air is pumped into the colon. Its okay to pass gas during the procedure.  After the Test    You may discuss the results with your doctor right away or at a future visit.   Try to pass all the gas right after the test to help prevent bloating and cramping.   After the test, you can go back to your normal eating and other activities.  Risks and Possible Complications Include:   Bleeding  A puncture or tear in the colon  Risks of anesthesia

## 2017-05-17 NOTE — TELEPHONE ENCOUNTER
----- Message from Madelaine Biggs LPN sent at 5/17/2017 10:09 AM CDT -----  Pt scheduled 6/19 for EGD/C-SCOPE   CPT- 89119/ 27440  DX- D50.0- Iron deficiency anemia  Thanks

## 2017-05-21 DIAGNOSIS — D64.9 ANEMIA, UNSPECIFIED TYPE: ICD-10-CM

## 2017-05-22 RX ORDER — FERROUS SULFATE 325(65) MG
TABLET ORAL
Qty: 60 TABLET | Refills: 0 | Status: SHIPPED | OUTPATIENT
Start: 2017-05-22 | End: 2017-06-20 | Stop reason: SDUPTHER

## 2017-06-02 ENCOUNTER — CLINICAL SUPPORT (OUTPATIENT)
Dept: REHABILITATION | Facility: HOSPITAL | Age: 52
End: 2017-06-02
Attending: FAMILY MEDICINE
Payer: MEDICAID

## 2017-06-02 DIAGNOSIS — M25.551 PAIN OF RIGHT HIP JOINT: Primary | ICD-10-CM

## 2017-06-02 PROCEDURE — 97110 THERAPEUTIC EXERCISES: CPT | Mod: PN

## 2017-06-02 NOTE — PROGRESS NOTES
".  TIME RECORD    Date:  06/02/2017    Start Time:  1105  Stop Time:  1200    PROCEDURES:    TIMED  Procedure Min.   MT 12'   TE 15'   TE supervised 30' nc             UNTIMED  Procedure Min.             Total Timed Minutes:  27'  Total Timed Units:  2  Total Untimed Units:  0  Charges Billed/# of units:  2 TE      Progress/Current Status    Subjective:     Patient ID: Aggie Rasheed is a 51 y.o. female.  Diagnosis:   1. Pain of right hip joint       Pain: 0 /10  Pt reports no c/o pain currently.  States R hip after walking long distances.    Objective:     Initiated treatment session with bike x 10' f/b stretches per log below supervised x 20 min.  Then performed MT x 12 min consisting of long axis distraction, lateral and lateral with ER/IR hip joint mobs in supine, and anterior PA hip joint mobs in prone f/b hip strengthening exercises per log below 1:1 with PT x 15'.      DATE 6/2/17   VISIT 2   POC due 7/10/17   FOTO 2/5   Cap Visit  Cap Total -  -   MT 12'   Bike 10'   Piriformis str 30"x3 B   HSS w/ strap 30"x3 B   Hip flexor str 30"x3 B   Prone quad str 30"x3 B   Bridges 2x10 c 5" hold   SL clams 2x10 RTB   c 5" hold   Iso hip add 10"x10   SL hip abd 2x10 B   Steamboats Next?   SL balance -       INITIALS RB         Assessment:     Pt tolerated treatment well without c/o pain.  Progressed exercises and performed hip joint mobs today to further improve mobility with walking.  Encouraged pt to monitor symptoms and how long she walks prior to R hip pain.  Pt with good understanding.    Patient Education/Response:     Continue with HEP.  Gave pt RTB for clams.    Plans and Goals:     Continue with established Plan of Care towards PT goals.    GOALS: Short Term Goals:  4 weeks  1.Report decreased R hip pain  < / =  4-5/10 at worst to increase tolerance for walking.  2. Pt will have normal hip ROM without c/o hip pain in order to perform ADLs without difficulty.  3. Increase strength by 1/3 MMT grade in R LE  to " increase tolerance for ADL and work activities.  4. Pt to tolerate HEP to improve ROM and independence with ADL's     Long Term Goals: 8 weeks  1.Report decreased R hip pain < / = 1-2/10  to increase tolerance for walking.  2.Patient goal: To strengthen B LEs in order to decrease hip pain.    3.Increase strength to 4+/5 in  R LE  to increase tolerance for ADL and work activities.  4. Pt will have neg FABERs test on the R

## 2017-06-06 ENCOUNTER — CLINICAL SUPPORT (OUTPATIENT)
Dept: REHABILITATION | Facility: HOSPITAL | Age: 52
End: 2017-06-06
Attending: FAMILY MEDICINE
Payer: MEDICAID

## 2017-06-06 DIAGNOSIS — M25.551 PAIN OF RIGHT HIP JOINT: ICD-10-CM

## 2017-06-06 PROCEDURE — 97110 THERAPEUTIC EXERCISES: CPT | Mod: PN

## 2017-06-06 NOTE — PROGRESS NOTES
".  TIME RECORD    Date:  06/06/2017    Start Time:  805  Stop Time:  900    PROCEDURES:    TIMED  Procedure Min.   MT 12'   TE 35'   TE supervised (Bike) 8' nc             UNTIMED  Procedure Min.             Total Timed Minutes:  47  Total Timed Units:  3  Total Untimed Units:  0  Charges Billed/# of units:  3 TE      Progress/Current Status    Subjective:     Patient ID: Aggie Rasheed is a 51 y.o. female.  Diagnosis:   1. Pain of right hip joint       Pain: 0 /10  Pt states that she was sore over the weekend following her session on Friday.    Objective:     Initiated treatment session with bike x 8' f/b therex per log 1:1 w/PT x 35' f/b MT x 12 min consisting of long axis distraction, lateral and lateral with ER/IR hip joint mobs in supine, and anterior PA hip joint mobs in prone.    DATE 6/6/17 6/2/17   VISIT 3 2   POC due 7/10/17 7/10/17   FOTO 3/5 2/5   Cap Visit  Cap Total -  - -  -   MT 12' 12'   Bike 8' 10'   Piriformis str 30"x3 B 30"x3 B   HSS w/ strap 30"x3 B 30"x3 B   Hip flexor str 30"x3 B 30"x3 B   Prone quad str 30"x3 B 30"x3 B   Bridges 2x10 c 5" hold 2x10 c 5" hold   SL clams 2x10 RTB  2x10 RTB   c 5" hold   Iso hip add 10"x10 10"x10   SL hip abd 2x10 B 2x10 B   Steamboats 10x each GTB flex/abd/ext w/ green foam pad Next?   SL balance - -        INITIALS RR RB         Assessment:     Pt tolerated tx session well and was able to complete all ex's. Pt is progressing according to plan at this time.    Patient Education/Response:     Continue with HEP    Plans and Goals:     Cont with PT POC and progress as tolerated    GOALS: Short Term Goals:  4 weeks  1.Report decreased R hip pain  < / =  4-5/10 at worst to increase tolerance for walking.  2. Pt will have normal hip ROM without c/o hip pain in order to perform ADLs without difficulty.  3. Increase strength by 1/3 MMT grade in R LE  to increase tolerance for ADL and work activities.  4. Pt to tolerate HEP to improve ROM and independence with " ADL's     Long Term Goals: 8 weeks  1.Report decreased R hip pain < / = 1-2/10  to increase tolerance for walking.  2.Patient goal: To strengthen B LEs in order to decrease hip pain.    3.Increase strength to 4+/5 in  R LE  to increase tolerance for ADL and work activities.  4. Pt will have neg FABERs test on the R

## 2017-06-08 ENCOUNTER — CLINICAL SUPPORT (OUTPATIENT)
Dept: REHABILITATION | Facility: HOSPITAL | Age: 52
End: 2017-06-08
Attending: FAMILY MEDICINE
Payer: MEDICAID

## 2017-06-08 DIAGNOSIS — M25.551 PAIN OF RIGHT HIP JOINT: ICD-10-CM

## 2017-06-08 PROCEDURE — 97110 THERAPEUTIC EXERCISES: CPT | Mod: PN

## 2017-06-08 NOTE — PROGRESS NOTES
".  TIME RECORD    Date:  06/08/2017    Start Time:  9:05  Stop Time:  10:00    PROCEDURES:    TIMED  Procedure Min.   MT 20 min   TE 35                 UNTIMED  Procedure Min.             Total Timed Minutes:  55  Total Timed Units:  4  Total Untimed Units: 0  Charges Billed/# of units:  4 TE    Progress/Current Status    Subjective:     Patient ID: Aggie Rasheed is a 51 y.o. female.  Diagnosis:   1. Pain of right hip joint       Pain: pt reports she felt R hip soreness after her last PT session.     Objective:     Initiated treatment session with bike x 6'. Pt then positioned in left sidelying for 20 min of manual therapy to R lateral LE and hip: STM to ITband, STM to lateral Right hamstring, STM gluteal musculature, long axis distraction to R hip. Pt then completed therex per log 1:1 w/PTA x 35'     DATE 6/8/17 6/6/17 6/2/17   VISIT 4 3 2   POC due 7/10/17 7/10/17 7/10/17   FOTO 4/5 3/5 2/5   MT 20 12' 12'   Bike 6  8' 10'   Piriformis str 30"x3 B 30"x3 B 30"x3 B   HSS w/ strap 30"X3 B 30"x3 B 30"x3 B   Hip flexor str  30"x3 B 30"x3 B   Prone quad str 30"x3 30"x3 B 30"x3 B   Bridges 2x10 c 5" hold 2x10 c 5" hold 2x10 c 5" hold   SL clams 2x10 RTB 2x10 RTB  2x10 RTB   c 5" hold   Iso hip add 10"x10 10"x10 10"x10   SL hip abd  2x10 B 2x10 B   Steamboats 10x each GTB flex/abd/ext w/ green foam pad 10x each GTB flex/abd/ext w/ green foam pad Next?   SL balance  - -         INITIALS JA 1/6 RR RB         Assessment:     Pt completed today's session with no reports of increased pain in right hip. Completed all therex with some verbal instruction on technique, overall no adverse reactions to session noted.     Patient Education/Response:     Continue with HEP    Plans and Goals:     Cont with PT POC and progress as tolerated    GOALS: Short Term Goals:  4 weeks  1.Report decreased R hip pain  < / =  4-5/10 at worst to increase tolerance for walking.  2. Pt will have normal hip ROM without c/o hip pain in order to " perform ADLs without difficulty.  3. Increase strength by 1/3 MMT grade in R LE  to increase tolerance for ADL and work activities.  4. Pt to tolerate HEP to improve ROM and independence with ADL's     Long Term Goals: 8 weeks  1.Report decreased R hip pain < / = 1-2/10  to increase tolerance for walking.  2.Patient goal: To strengthen B LEs in order to decrease hip pain.    3.Increase strength to 4+/5 in  R LE  to increase tolerance for ADL and work activities.  4. Pt will have neg FABERs test on the R

## 2017-06-13 ENCOUNTER — TELEPHONE (OUTPATIENT)
Dept: NEUROLOGY | Facility: HOSPITAL | Age: 52
End: 2017-06-13

## 2017-06-13 NOTE — TELEPHONE ENCOUNTER
----- Message from Esther Claros sent at 6/13/2017 10:27 AM CDT -----  Contact: Patient  GI- Patient Called and needed to speak to nurse regarding medication before her procedure on 6/19/2017. Medication xarelto patients contact number [phone]

## 2017-06-14 ENCOUNTER — TELEPHONE (OUTPATIENT)
Dept: NEUROLOGY | Facility: HOSPITAL | Age: 52
End: 2017-06-14

## 2017-06-14 ENCOUNTER — CLINICAL SUPPORT (OUTPATIENT)
Dept: REHABILITATION | Facility: HOSPITAL | Age: 52
End: 2017-06-14
Attending: FAMILY MEDICINE
Payer: MEDICAID

## 2017-06-14 DIAGNOSIS — M25.551 PAIN OF RIGHT HIP JOINT: ICD-10-CM

## 2017-06-14 PROCEDURE — 97110 THERAPEUTIC EXERCISES: CPT | Mod: PN

## 2017-06-14 NOTE — PROGRESS NOTES
".  TIME RECORD    Date:  06/14/2017    Start Time:  9:05  Stop Time:  10:05    PROCEDURES:    TIMED  Procedure Min.   MT 15   TE 35                 UNTIMED  Procedure Min.             Total Timed Minutes:  50  Total Timed Units:  3  Total Untimed Units: 0  Charges Billed/# of units:  3 TE    Progress/Current Status    Subjective:     Patient ID: Aggie Rasheed is a 51 y.o. female.  Diagnosis:   1. Pain of right hip joint       Pain:  Patient reports continued R hip pain.    Objective:      Pt positioned in left sidelying for 15 min of manual therapy to R lateral LE and hip: STM to ITband, STM to lateral Right hamstring, STM gluteal musculature, long axis distraction to R hip. Pt then completed therex per log 1:1 w/PTA x 35'  Completed session with 10 minutes on bike supervised.    DATE 6/14/17 6/8/17 6/6/17 6/2/17   VISIT  4 3 2   POC due 7/10/17 7/10/17 7/10/17 7/10/17   FOTO complete 4/5 3/5 2/5   MT 20 20 12' 12'   Bike 10' 6  8' 10'   Piriformis str 30"x3 B 30"x3 B 30"x3 B 30"x3 B   HSS w/ strap 30"x3 B 30"X3 B 30"x3 B 30"x3 B   Hip flexor str 30"x3 B  30"x3 B 30"x3 B   Prone quad str 30"x3 B 30"x3 30"x3 B 30"x3 B   Bridges 2x12 w/5" hold 2x10 c 5" hold 2x10 c 5" hold 2x10 c 5" hold   SL clams 2x12 RTB 2x10 RTB 2x10 RTB  2x10 RTB   c 5" hold   Iso hip add 10"x10 10"x10 10"x10 10"x10   SL hip abd   2x10 B 2x10 B   Steamboats 15x ea GTC  W/green foam pad.  3 ways 10x each GTB flex/abd/ext w/ green foam pad 10x each GTB flex/abd/ext w/ green foam pad Next?   SL balance   - -          INITIALS CS 2/6 JA 1/6 RR RB         Assessment:     Pt completed today's session with no reports of increased pain in right hip.     Patient Education/Response:     Continue with HEP    Plans and Goals:     Cont with PT POC and progress as tolerated    GOALS: Short Term Goals:  4 weeks  1.Report decreased R hip pain  < / =  4-5/10 at worst to increase tolerance for walking.  2. Pt will have normal hip ROM without c/o hip pain in " order to perform ADLs without difficulty.  3. Increase strength by 1/3 MMT grade in R LE  to increase tolerance for ADL and work activities.  4. Pt to tolerate HEP to improve ROM and independence with ADL's     Long Term Goals: 8 weeks  1.Report decreased R hip pain < / = 1-2/10  to increase tolerance for walking.  2.Patient goal: To strengthen B LEs in order to decrease hip pain.    3.Increase strength to 4+/5 in  R LE  to increase tolerance for ADL and work activities.  4. Pt will have neg FABERs test on the R

## 2017-06-14 NOTE — TELEPHONE ENCOUNTER
----- Message from Liliane Reyna sent at 6/14/2017 10:35 AM CDT -----  GI- Patient is calling to see if she should go off her Xerelto before her procedure. Please call back to assist.

## 2017-06-16 ENCOUNTER — CLINICAL SUPPORT (OUTPATIENT)
Dept: REHABILITATION | Facility: HOSPITAL | Age: 52
End: 2017-06-16
Attending: FAMILY MEDICINE
Payer: MEDICAID

## 2017-06-16 DIAGNOSIS — M25.551 PAIN OF RIGHT HIP JOINT: ICD-10-CM

## 2017-06-16 PROCEDURE — 97110 THERAPEUTIC EXERCISES: CPT | Mod: PN

## 2017-06-16 NOTE — PROGRESS NOTES
"TIME RECORD    Date:  06/16/2017    Start Time:  900  Stop Time:  1000    PROCEDURES:    TIMED  Procedure Min.   Manual therapy 15   Therex 15   Therex(supervised) 25          Total Timed Minutes:  30  Total Timed Units:  2  Total Untimed Units:  0  Charges Billed/# of units:  2 TE      Progress/Current Status    Subjective:     Patient ID: Aggie Rasheed is a 51 y.o. female.  Diagnosis:   1. Pain of right hip joint       Pain: 3 /10  Patient reports right hip/lateral thigh pain.    Objective:     Pt positioned in left sidelying for 15 min of manual therapy to R lateral LE and hip: STM to ITband, STM to lateral Right hamstring, STM gluteal musculature, long axis distraction to R hip. Pt then completed therex per log 1:1 w/PTA x 15', additional 25 minutes with supervision.  Deferred bike today.    DATE 6/16/17 6/14/17 6/8/17 6/6/17 6/2/17   VISIT 6  4 3 2   POC due 7/10/17 7/10/17 7/10/17 7/10/17 7/10/17   FOTO 6/10 complete 4/5 3/5 2/5   MT 15' 20 20 12' 12'   Bike NT 10' 6  8' 10'   Piriformis str 30"x3 B 30"x3 B 30"x3 B 30"x3 B 30"x3 B   HSS w/ strap 30"x3 30"x3 B 30"X3 B 30"x3 B 30"x3 B   Hip flexor str 30"x3 30"x3 B  30"x3 B 30"x3 B   Prone quad str 30"x3 30"x3 B 30"x3 30"x3 B 30"x3 B   Bridges 5" 2x12 2x12 w/5" hold 2x10 c 5" hold 2x10 c 5" hold 2x10 c 5" hold   SL clams RTB 2x15 2x12 RTB 2x10 RTB 2x10 RTB  2x10 RTB   c 5" hold   Iso hip add 10"x10 10"x10 10"x10 10"x10 10"x10   SL hip abd    2x10 B 2x10 B   Steamboats GTC 2x10 ea 3 ways on green oval 15x ea GTC  W/green foam pad.  3 ways 10x each GTB flex/abd/ext w/ green foam pad 10x each GTB flex/abd/ext w/ green foam pad Next?   SL balance --   - -    --       INITIALS DH 3/6 CS 2/6 JA 1/6 RR RB       Assessment:     Patient able to increase activity slightly with added reps without complaint of pain or difficulty.  Reports "just tired".    Patient Education/Response:     Patient educated to continue HEP.  Verbalized understanding.    Plans and Goals: "     Cont with PT POC and progress as tolerated    GOALS: Short Term Goals:  4 weeks  1.Report decreased R hip pain  < / =  4-5/10 at worst to increase tolerance for walking.  2. Pt will have normal hip ROM without c/o hip pain in order to perform ADLs without difficulty.  3. Increase strength by 1/3 MMT grade in R LE  to increase tolerance for ADL and work activities.  4. Pt to tolerate HEP to improve ROM and independence with ADL's     Long Term Goals: 8 weeks  1.Report decreased R hip pain < / = 1-2/10  to increase tolerance for walking.  2.Patient goal: To strengthen B LEs in order to decrease hip pain.    3.Increase strength to 4+/5 in  R LE  to increase tolerance for ADL and work activities.  4. Pt will have neg FABERs test on the R

## 2017-06-19 ENCOUNTER — ANESTHESIA (OUTPATIENT)
Dept: ENDOSCOPY | Facility: HOSPITAL | Age: 52
End: 2017-06-19
Payer: MEDICAID

## 2017-06-19 ENCOUNTER — HOSPITAL ENCOUNTER (OUTPATIENT)
Facility: HOSPITAL | Age: 52
Discharge: HOME | End: 2017-06-19
Attending: INTERNAL MEDICINE | Admitting: INTERNAL MEDICINE
Payer: MEDICAID

## 2017-06-19 ENCOUNTER — ANESTHESIA EVENT (OUTPATIENT)
Dept: ENDOSCOPY | Facility: HOSPITAL | Age: 52
End: 2017-06-19
Payer: MEDICAID

## 2017-06-19 ENCOUNTER — SURGERY (OUTPATIENT)
Age: 52
End: 2017-06-19

## 2017-06-19 DIAGNOSIS — D50.9 IRON DEFICIENCY ANEMIA, UNSPECIFIED IRON DEFICIENCY ANEMIA TYPE: Primary | ICD-10-CM

## 2017-06-19 DIAGNOSIS — K63.5 POLYP OF COLON, UNSPECIFIED PART OF COLON, UNSPECIFIED TYPE: ICD-10-CM

## 2017-06-19 DIAGNOSIS — K29.70 GASTRITIS, PRESENCE OF BLEEDING UNSPECIFIED, UNSPECIFIED CHRONICITY, UNSPECIFIED GASTRITIS TYPE: ICD-10-CM

## 2017-06-19 DIAGNOSIS — D50.0 IRON DEFICIENCY ANEMIA DUE TO CHRONIC BLOOD LOSS: ICD-10-CM

## 2017-06-19 DIAGNOSIS — D50.9 IDA (IRON DEFICIENCY ANEMIA): ICD-10-CM

## 2017-06-19 LAB — POCT GLUCOSE: 207 MG/DL (ref 70–110)

## 2017-06-19 PROCEDURE — 63600175 PHARM REV CODE 636 W HCPCS: Performed by: NURSE ANESTHETIST, CERTIFIED REGISTERED

## 2017-06-19 PROCEDURE — 37000009 HC ANESTHESIA EA ADD 15 MINS: Performed by: INTERNAL MEDICINE

## 2017-06-19 PROCEDURE — 25000003 PHARM REV CODE 250: Performed by: INTERNAL MEDICINE

## 2017-06-19 PROCEDURE — 27201012 HC FORCEPS, HOT/COLD, DISP: Performed by: INTERNAL MEDICINE

## 2017-06-19 PROCEDURE — 25000003 PHARM REV CODE 250: Performed by: NURSE ANESTHETIST, CERTIFIED REGISTERED

## 2017-06-19 PROCEDURE — 27201089 HC SNARE, DISP (ANY): Performed by: INTERNAL MEDICINE

## 2017-06-19 PROCEDURE — 88305 TISSUE EXAM BY PATHOLOGIST: CPT | Performed by: PATHOLOGY

## 2017-06-19 PROCEDURE — 88305 TISSUE EXAM BY PATHOLOGIST: CPT | Mod: 26,,, | Performed by: PATHOLOGY

## 2017-06-19 PROCEDURE — 27200997: Performed by: INTERNAL MEDICINE

## 2017-06-19 PROCEDURE — 27201028 HC NEEDLE, SCLERO: Performed by: INTERNAL MEDICINE

## 2017-06-19 PROCEDURE — 45381 COLONOSCOPY SUBMUCOUS NJX: CPT | Performed by: INTERNAL MEDICINE

## 2017-06-19 PROCEDURE — 43239 EGD BIOPSY SINGLE/MULTIPLE: CPT | Performed by: INTERNAL MEDICINE

## 2017-06-19 PROCEDURE — 37000008 HC ANESTHESIA 1ST 15 MINUTES: Performed by: INTERNAL MEDICINE

## 2017-06-19 PROCEDURE — 45390 COLONOSCOPY W/RESECTION: CPT | Performed by: INTERNAL MEDICINE

## 2017-06-19 PROCEDURE — 45385 COLONOSCOPY W/LESION REMOVAL: CPT | Performed by: INTERNAL MEDICINE

## 2017-06-19 RX ORDER — SODIUM CHLORIDE 9 MG/ML
INJECTION, SOLUTION INTRAVENOUS CONTINUOUS
Status: DISCONTINUED | OUTPATIENT
Start: 2017-06-19 | End: 2017-06-19 | Stop reason: HOSPADM

## 2017-06-19 RX ORDER — PROPOFOL 10 MG/ML
VIAL (ML) INTRAVENOUS
Status: DISCONTINUED | OUTPATIENT
Start: 2017-06-19 | End: 2017-06-19

## 2017-06-19 RX ORDER — LIDOCAINE HCL/PF 100 MG/5ML
SYRINGE (ML) INTRAVENOUS
Status: DISCONTINUED | OUTPATIENT
Start: 2017-06-19 | End: 2017-06-19

## 2017-06-19 RX ORDER — PROPOFOL 10 MG/ML
VIAL (ML) INTRAVENOUS CONTINUOUS PRN
Status: DISCONTINUED | OUTPATIENT
Start: 2017-06-19 | End: 2017-06-19

## 2017-06-19 RX ADMIN — LIDOCAINE HYDROCHLORIDE 40 MG: 20 INJECTION, SOLUTION INTRAVENOUS at 09:06

## 2017-06-19 RX ADMIN — PROPOFOL 60 MG: 10 INJECTION, EMULSION INTRAVENOUS at 09:06

## 2017-06-19 RX ADMIN — PROPOFOL 150 MCG/KG/MIN: 10 INJECTION, EMULSION INTRAVENOUS at 09:06

## 2017-06-19 RX ADMIN — SODIUM CHLORIDE: 0.9 INJECTION, SOLUTION INTRAVENOUS at 07:06

## 2017-06-19 NOTE — H&P
"[facility]    CC: anemia    HPI 51 y.o. female here for initial evaluation of several months of microcytic, iron deficiency anemia associated with dark loose stool.  Pertinent medical problems include Protein C deficiency (LE DVTs, AAA thrombus s/p emergent repair) on Xarelto, DM, benign thyroid nodules, and Lupus (plaquenil).  She was noted to have a new anemia 2/2017 and was started on twice daily oral iron: iron studies at that time were consistent with JN.  Since around that time, she has had consistently loose, dark, tarry/sticky stool.  Chronically, she has mild constipation alternating with diarrhea.       She is a former heavy NSAID user but stopped in 2/2017.  She has GERD which is well controlled with a PPI.  She has post prandial abdominal discomfort, but otherwise denies other symptoms.  She had an aunt with colon cancer at age <60.  She has never had previous endoscopy.      Past Medical History:   Diagnosis Date    Diabetes mellitus type II     Headache associated with hormonal factors     Hyperlipidemia     Hypertension     Lupus     Protein C deficiency     Tachycardia          Review of Systems  General ROS: negative for chills, fever or weight loss  Cardiovascular ROS: no chest pain or dyspnea on exertion  Gastrointestinal ROS: no abdominal pain, change in bowel habits, or black/ bloody stools    Physical Examination  BP (!) 174/70 (BP Method: Automatic)   Pulse 85   Temp 98.6 °F (37 °C) (Oral)   Resp 16   Ht 5' 2" (1.575 m)   Wt 61.2 kg (135 lb)   SpO2 100%   Breastfeeding? No   BMI 24.69 kg/m²   General appearance: alert, cooperative, no distress  HENT: Normocephalic, atraumatic, neck symmetrical, no nasal discharge   Lungs: clear to auscultation bilaterally, no dullness to percussion bilaterally  Heart: regular rate and rhythm without rub; no displacement of the PMI   Abdomen: soft, non-tender; bowel sounds normoactive; no organomegaly  Extremities: extremities " symmetric; no clubbing, cyanosis, or edema  Neurologic: Alert and oriented X 3, normal strength, normal coordination and gait    Labs:  Lab Results   Component Value Date    WBC 8.10 11/10/2016    HGB 10.5 (L) 11/10/2016    HCT 35.4 (L) 11/10/2016    MCV 70 (L) 11/10/2016     11/10/2016         Imaging:    Assessment:   51 year old female with HTN, lupus, protein C deficiency with a new iron deficiency anemia and loose dark stool.  Her presentation is suspicion for chronic GI blood loss anemia     Plan:  Plan:   -EGD/Colonoscopy today  - She will continue her Xarelto for endoscopy due to high risk  - If these tests are unrevealing, then I will consider VCE as the next step in evaluating her JN      Markos Calvert MD   [address] [place] [address], [address]   [place] [id number] [phone]

## 2017-06-19 NOTE — TRANSFER OF CARE
"Anesthesia Transfer of Care Note    Patient: Aggie Rasheed    Procedure(s) Performed: Procedure(s) (LRB):  ESOPHAGOGASTRODUODENOSCOPY (EGD) (N/A)  COLONOSCOPY (N/A)    Patient location: GI    Anesthesia Type: MAC    Transport from OR: Transported from OR on room air with adequate spontaneous ventilation    Post pain: adequate analgesia    Post assessment: no apparent anesthetic complications    Post vital signs: stable    Level of consciousness: awake    Nausea/Vomiting: no nausea/vomiting    Complications: none    Transfer of care protocol was followed      Last vitals:   Visit Vitals  BP (!) 174/70 (BP Method: Automatic)   Pulse 85   Temp 37 °C (98.6 °F) (Oral)   Resp 16   Ht 5' 2" (1.575 m)   Wt 61.2 kg (135 lb)   SpO2 100%   Breastfeeding? No   BMI 24.69 kg/m²     "

## 2017-06-19 NOTE — PLAN OF CARE
md at bedside explaining the risk of bleeding because patient is on blood thinners   Patient verbalizes understanding

## 2017-06-19 NOTE — ANESTHESIA POSTPROCEDURE EVALUATION
"Anesthesia Post Evaluation    Patient: Aggie Rasheed    Procedure(s) Performed: Procedure(s) (LRB):  ESOPHAGOGASTRODUODENOSCOPY (EGD) (N/A)  COLONOSCOPY (N/A)    Final Anesthesia Type: MAC  Patient location during evaluation: GI PACU  Patient participation: Yes- Able to Participate  Level of consciousness: awake and alert and oriented  Post-procedure vital signs: reviewed and stable  Pain management: adequate  Airway patency: patent  PONV status at discharge: No PONV  Anesthetic complications: no      Cardiovascular status: blood pressure returned to baseline and hemodynamically stable  Respiratory status: unassisted, spontaneous ventilation and room air  Hydration status: euvolemic  Follow-up not needed.        Visit Vitals  BP (!) 174/70 (BP Method: Automatic)   Pulse 85   Temp 37 °C (98.6 °F) (Oral)   Resp 16   Ht 5' 2" (1.575 m)   Wt 61.2 kg (135 lb)   SpO2 100%   Breastfeeding? No   BMI 24.69 kg/m²       Pain/Aliyah Score: Pain Assessment Performed: Yes (6/19/2017  7:33 AM)  Presence of Pain: denies (6/19/2017  7:33 AM)  Pain Rating Prior to Med Admin: 0 (6/19/2017  7:33 AM)      "

## 2017-06-19 NOTE — PLAN OF CARE
Patient awake and alert   Discharge instructions give and medications for discharged explained.    Patient expressed understanding

## 2017-06-19 NOTE — ANESTHESIA PREPROCEDURE EVALUATION
2017  Aggie Rasheed is a 51 y.o., female for EGD    Review of patient's allergies indicates:   Allergen Reactions    Niacin preparations Other (See Comments)     Red inflammed eyes    Codeine Rash     Past Medical History:   Diagnosis Date    Diabetes mellitus type II     Headache associated with hormonal factors     Hyperlipidemia     Hypertension     Lupus     Protein C deficiency     Tachycardia      Past Surgical History:   Procedure Laterality Date    AORTIC VALVE SURGERY       SECTION      TOE AMPUTATION      TONSILLECTOMY      WRIST FRACTURE SURGERY       Patient Active Problem List   Diagnosis    Lupus    Hypertension    Hyperlipidemia    Headache associated with hormonal factors    Protein C deficiency    Nuclear sclerosis    Dry eye syndrome    Type 2 diabetes mellitus without ophthalmic manifestations    Migraine without aura    Multiple thyroid nodules    Iron deficiency anemia    Pain of right hip joint    JN (iron deficiency anemia)       Wt Readings from Last 3 Encounters:   17 61.2 kg (135 lb)   17 61.5 kg (135 lb 9.6 oz)   17 59.8 kg (131 lb 13.4 oz)     Temp Readings from Last 3 Encounters:   17 37 °C (98.6 °F) (Oral)   17 36.6 °C (97.9 °F)   17 36.9 °C (98.4 °F) (Oral)     BP Readings from Last 3 Encounters:   17 (!) 174/70   17 (!) 167/88   17 (!) 179/86     Pulse Readings from Last 3 Encounters:   17 85   17 86   17 76         Anesthesia Evaluation         Review of Systems    Lab Results   Component Value Date    WBC 8.10 11/10/2016    HGB 10.5 (L) 11/10/2016    HCT 35.4 (L) 11/10/2016    MCV 70 (L) 11/10/2016     11/10/2016         Physical Exam  General:  Well nourished, Obesity    Airway/Jaw/Neck:  Airway Findings: Mouth Opening: Normal Tongue: Normal  General  Airway Assessment: Adult  Mallampati: II  Improves to II with phonation.  TM Distance: Normal, at least 6 cm       Chest/Lungs:  Chest/Lungs Findings: Clear to auscultation, Normal Respiratory Rate     Heart/Vascular:  Heart Findings: Rate: Normal  Rhythm: Regular Rhythm  Sounds: Normal        Mental Status:  Mental Status Findings:  Cooperative, Alert and Oriented         Anesthesia Plan  Type of Anesthesia, risks & benefits discussed:  Anesthesia Type:  MAC  Patient's Preference:   Intra-op Monitoring Plan:   Intra-op Monitoring Plan Comments:   Post Op Pain Control Plan:   Post Op Pain Control Plan Comments:   Induction:   IV  Beta Blocker:         Informed Consent: Patient understands risks and agrees with Anesthesia plan.  Questions answered. Anesthesia consent signed with patient.  ASA Score: 3     Day of Surgery Review of History & Physical: I have interviewed and examined the patient. I have reviewed the patient's H&P dated:  There are no significant changes.  H&P update referred to the provider.  H&P completed by Anesthesiologist.       Ready For Surgery From Anesthesia Perspective.

## 2017-06-19 NOTE — DISCHARGE INSTRUCTIONS
Discharge Summary/Instructions for EGD and Colonoscopy  Aggie Rasheed  6/19/2017  Markos Calvert MD    Restrictions on Activity:    - Do not drive car or operate machinery until the day after the procedure.  - The following day: return to full activity including work.  - For 3 days: No heavy lifting, straining or running.  - Diet: Eat and drink normally unless instructed otherwise.    Treatment for Common Side Effects:  - Mild abdominal pain and bloating or excessive gas: rest, eat lightly and use a heating pad.   -Sore Throat - treat with throat lozenges, gargle with warm salt water.    Symptoms to watch for and report to your physician:  1. Severe abdominal pain.  2. Fever within 24 hours after a procedure.  3. A large amount of rectal bleeding. (A small amount of blood from the rectum is not serious, especially if hemorrhoids are present.)  4. Because air was put into your colon during the procedure, expelling large amount of air from your rectum is normal.  5. You may not have a bowel movement for 1-3 days because of the colonoscopy prep. This is normal.  6. Go directly to the emergency room if you notice any of the following:     Chills and/orfever over 101   Persistent vomiting   Severe abdominal pain, other than gas cramps   Severe chest pain   Black, tarry stools   Any bleeding - exceeding one tablespoon    If you have any questions or problems, please call your Physician:    Markos Calvert MD    Lab Results: Contact Physician's Office    If a complication or emergency situation arises and you are unable to reach your Physician - GO TO THE EMERGENCY ROOM.

## 2017-06-20 VITALS
SYSTOLIC BLOOD PRESSURE: 162 MMHG | HEART RATE: 77 BPM | BODY MASS INDEX: 24.84 KG/M2 | OXYGEN SATURATION: 99 % | DIASTOLIC BLOOD PRESSURE: 116 MMHG | HEIGHT: 62 IN | RESPIRATION RATE: 22 BRPM | TEMPERATURE: 97 F | WEIGHT: 135 LBS

## 2017-06-20 DIAGNOSIS — D64.9 ANEMIA, UNSPECIFIED TYPE: ICD-10-CM

## 2017-06-20 RX ORDER — FERROUS SULFATE 325(65) MG
TABLET ORAL
Qty: 60 TABLET | Refills: 0 | Status: SHIPPED | OUTPATIENT
Start: 2017-06-20 | End: 2017-07-07 | Stop reason: SDUPTHER

## 2017-06-21 ENCOUNTER — CLINICAL SUPPORT (OUTPATIENT)
Dept: REHABILITATION | Facility: HOSPITAL | Age: 52
End: 2017-06-21
Attending: FAMILY MEDICINE
Payer: MEDICAID

## 2017-06-21 DIAGNOSIS — M25.551 PAIN OF RIGHT HIP JOINT: Primary | ICD-10-CM

## 2017-06-21 PROCEDURE — 97110 THERAPEUTIC EXERCISES: CPT | Mod: PN

## 2017-06-21 NOTE — PROGRESS NOTES
"TIME RECORD    Date:  06/21/2017    Start Time:  1005  Stop Time:  1100    PROCEDURES:    TIMED  Procedure Min.   Manual therapy 13   Therex 40   Therex(supervised) 0          Total Timed Minutes:  53  Total Timed Units:  3  Total Untimed Units:  0  Charges Billed/# of units:  4 TE      Progress/Current Status    Subjective:     Patient ID: Aggie Rasheed is a 51 y.o. female.  Diagnosis:   1. Pain of right hip joint       Pain: 2 /10  Pt states that she continues to have pain in R hip after 5-10 min of walking.  States that pain has not changed yet.    Objective:     Pt positioned in left sidelying for 13 min of manual therapy consisting of STM to ITband, STM to lateral Right hamstring, STM gluteal musculature, and long axis distraction to R hip.  Pt then completed therex per log 1:1 w/PT x 40'.  Deferred bike today.    DATE 6/21/17 6/16/17 6/14/17 6/8/17 6/6/17 6/2/17   VISIT 7 6  4 3 2   POC due 7/10/17 7/10/17 7/10/17 7/10/17 7/10/17 7/10/17   FOTO 7/10 6/10 complete 4/5 3/5 2/5   MT 13' 15' 20 20 12' 12'   Bike  NT 10' 6  8' 10'   Piriformis str 30"x3 B 30"x3 B 30"x3 B 30"x3 B 30"x3 B 30"x3 B   HSS w/ strap 30"x3 B 30"x3 30"x3 B 30"X3 B 30"x3 B 30"x3 B   Hip flexor str 30"x3 B 30"x3 30"x3 B  30"x3 B 30"x3 B   Prone quad str 30"x3 B c 1/2 FR under knee 30"x3 30"x3 B 30"x3 30"x3 B 30"x3 B   Bridges 5" hold 2x15 5" 2x12 2x12 w/5" hold 2x10 c 5" hold 2x10 c 5" hold 2x10 c 5" hold   SL clams RTB 2x15   c 5" hold RTB 2x15 2x12 RTB 2x10 RTB 2x10 RTB  2x10 RTB   c 5" hold   Iso hip add 10"x10 10"x10 10"x10 10"x10 10"x10 10"x10   SL hip abd     2x10 B 2x10 B   Steamboats grn CLX   2x12 B  3 ways on green oval  GTC 2x10 ea 3 ways on green oval 15x ea GTC  W/green foam pad.  3 ways 10x each GTB flex/abd/ext w/ green foam pad 10x each GTB flex/abd/ext w/ green foam pad Next?   SL balance 30"x3 R --   - -   Monster walks grn CLX lat  x 1 lap --       INITIALS RB DH 3/6 CS 2/6 JA 1/6 RR RB       Assessment:     Improved " joint mobility today noted with long axis distraction, however only after multiple attempts to get pt to relax.  Increased reps with some exercises and added SLS and monster walks today.  Pt tolerated treatment fair, stating that her hip was hurting at end of session.  Pain appears to be more muscular in nature.  Encouraged pt to ice hip at home.      Patient Education/Response:     Patient educated to continue HEP.  Verbalized understanding.      Plans and Goals:     Cont with PT POC and progress as tolerated    GOALS: Short Term Goals:  4 weeks  1.Report decreased R hip pain  < / =  4-5/10 at worst to increase tolerance for walking.  2. Pt will have normal hip ROM without c/o hip pain in order to perform ADLs without difficulty.  3. Increase strength by 1/3 MMT grade in R LE  to increase tolerance for ADL and work activities.  4. Pt to tolerate HEP to improve ROM and independence with ADL's     Long Term Goals: 8 weeks  1.Report decreased R hip pain < / = 1-2/10  to increase tolerance for walking.  2.Patient goal: To strengthen B LEs in order to decrease hip pain.    3.Increase strength to 4+/5 in  R LE  to increase tolerance for ADL and work activities.  4. Pt will have neg FABERs test on the R

## 2017-06-22 ENCOUNTER — TELEPHONE (OUTPATIENT)
Dept: NEUROLOGY | Facility: HOSPITAL | Age: 52
End: 2017-06-22

## 2017-06-22 NOTE — TELEPHONE ENCOUNTER
[facility] Note    I have reviewed the patient's pathology results which were negative for H.  Pylori.  Her abdominal pain has improved on her daily PPI.  She will need a repeat colonoscopy in 5 years based on her colonoscopy pathology results.  I will schedule her for video capsule endoscopy to further evaluate for a source of her anemia.

## 2017-06-23 ENCOUNTER — TELEPHONE (OUTPATIENT)
Dept: NEUROLOGY | Facility: HOSPITAL | Age: 52
End: 2017-06-23

## 2017-06-23 DIAGNOSIS — D50.0 IRON DEFICIENCY ANEMIA SECONDARY TO BLOOD LOSS (CHRONIC): Primary | ICD-10-CM

## 2017-06-23 NOTE — TELEPHONE ENCOUNTER
Video Capsule Endoscopy scheduled with pt on Monday, July 24, 2017.  Pt notified endoscopy staff will contact her on Friday, July 21, 2017 with arrival time.  Pt to arrive at hospital 1st floor admission the day of procedure.  Pt instructed not to eat or drink anything after midnight and to bring someone with her.  Pt acknowledged understanding.

## 2017-06-23 NOTE — TELEPHONE ENCOUNTER
----- Message from Markos Calvert MD sent at 6/22/2017  2:07 PM CDT -----  Hello,    Can you schedule the above patient for a video capsule endoscopy study for JN?    Thanks!

## 2017-06-28 ENCOUNTER — CLINICAL SUPPORT (OUTPATIENT)
Dept: REHABILITATION | Facility: HOSPITAL | Age: 52
End: 2017-06-28
Attending: FAMILY MEDICINE
Payer: MEDICAID

## 2017-06-28 DIAGNOSIS — M25.551 PAIN OF RIGHT HIP JOINT: ICD-10-CM

## 2017-06-28 PROCEDURE — 97110 THERAPEUTIC EXERCISES: CPT | Mod: PN

## 2017-06-28 NOTE — PROGRESS NOTES
"TIME RECORD    Date:  06/28/2017    Start Time:  1000  Stop Time:  1105    PROCEDURES:    TIMED  Procedure Min.   Manual therapy 15   Therex 20   Therex (supervised) 30         Total Timed Minutes:  35  Total Timed Units:  2  Total Untimed Units:  0  Charges Billed/# of units:  2  MTx1, TEx1      Progress/Current Status    Subjective:     Patient ID: Aggie Rasheed is a 51 y.o. female.  Diagnosis:   1. Pain of right hip joint       Pain: 5-6/10  Continued reports of right hip pain with walking and standing.    Objective:       Pt positioned in left sidelying for 15 min of manual therapy consisting of STM to ITband, STM to lateral Right hamstring, STM gluteal musculature.  To supine for long axis distraction to R hip.  Pt then completed therex per log 1:1 w/PTA x 15', additional 30 minutes with supervision. including bike at end of session.    DATE 6/28/17 6/21/17 6/16/17 6/14/17 6/8/17 6/6/17 6/2/17   VISIT 8 7 6  4 3 2   POC due 7/10/17 7/10/17 7/10/17 7/10/17 7/10/17 7/10/17 7/10/17   FOTO 8/10 7/10 6/10 complete 4/5 3/5 2/5   MT 15' 13' 15' 20 20 12' 12'   Bike 5' at end  NT 10' 6  8' 10'   Piriformis str 30"x3 B 30"x3 B 30"x3 B 30"x3 B 30"x3 B 30"x3 B 30"x3 B   HSS w/ strap 30"x3 B 30"x3 B 30"x3 30"x3 B 30"X3 B 30"x3 B 30"x3 B   Hip flexor str 30"x3 B 30"x3 B 30"x3 30"x3 B  30"x3 B 30"x3 B   Prone quad str 30"x3 B w/ 1/2 FR under knee 30"x3 B c 1/2 FR under knee 30"x3 30"x3 B 30"x3 30"x3 B 30"x3 B   Bridges 5" hold 2x15 5" hold 2x15 5" 2x12 2x12 w/5" hold 2x10 c 5" hold 2x10 c 5" hold 2x10 c 5" hold   SL clams RTB 2x15   w/ 5" hold RTB 2x15   c 5" hold RTB 2x15 2x12 RTB 2x10 RTB 2x10 RTB  2x10 RTB   c 5" hold   Iso hip add 10"x10 10"x10 10"x10 10"x10 10"x10 10"x10 10"x10   SL hip abd --     2x10 B 2x10 B   Steamboats GTC  2x12 B  3 ways on green oval  grn CLX   2x12 B  3 ways on green oval  GTC 2x10 ea 3 ways on green oval 15x ea GTC  W/green foam pad.  3 ways 10x each GTB flex/abd/ext w/ green foam pad 10x " "each GTB flex/abd/ext w/ green foam pad Next?   SL balance held 30"x3 R --   - -   Monster walks GTC lateral  2x35' grn CLX lat  x 1 lap --       INITIALS DH 1/6 RB DH 3/6 CS 2/6 JA 1/6 RR RB       Assessment:     Patient reported some relief initially after manual therapy with return of pain complaints after standing therex.  "A little better now" after bike at end of session.  Not specific to scale.    Patient Education/Response:     Patient educated to continue HEP.  Verbalized understanding.    Plans and Goals:       Cont with PT POC and progress as tolerated    GOALS: Short Term Goals:  4 weeks  1.Report decreased R hip pain  < / =  4-5/10 at worst to increase tolerance for walking.  2. Pt will have normal hip ROM without c/o hip pain in order to perform ADLs without difficulty.  3. Increase strength by 1/3 MMT grade in R LE  to increase tolerance for ADL and work activities.  4. Pt to tolerate HEP to improve ROM and independence with ADL's     Long Term Goals: 8 weeks  1.Report decreased R hip pain < / = 1-2/10  to increase tolerance for walking.  2.Patient goal: To strengthen B LEs in order to decrease hip pain.    3.Increase strength to 4+/5 in  R LE  to increase tolerance for ADL and work activities.  4. Pt will have neg FABERs test on the R        "

## 2017-06-30 ENCOUNTER — CLINICAL SUPPORT (OUTPATIENT)
Dept: REHABILITATION | Facility: HOSPITAL | Age: 52
End: 2017-06-30
Attending: FAMILY MEDICINE
Payer: MEDICAID

## 2017-06-30 DIAGNOSIS — M25.551 PAIN OF RIGHT HIP JOINT: ICD-10-CM

## 2017-06-30 PROCEDURE — 97110 THERAPEUTIC EXERCISES: CPT | Mod: PN

## 2017-06-30 NOTE — PROGRESS NOTES
"TIME RECORD    Date:  06/30/2017    Start Time:  1000  Stop Time:  1105    PROCEDURES:    TIMED  Procedure Min.   Manual therapy 15   Therex 40             Total Timed Minutes:  55  Total Timed Units:  4  Total Untimed Units:  0  Charges Billed/# of units:  4 TE      Progress/Current Status    Subjective:     Patient ID: Aggie Rasheed is a 51 y.o. female.  Diagnosis:   1. Pain of right hip joint       Pain: 5-6/10  Continued reports of right hip pain with walking and standing.    Objective:       Pt positioned in left sidelying for 15 min of manual therapy consisting of STM to ITband, STM to lateral Right hamstring, STM gluteal musculature.  To supine for long axis distraction to R hip.  Pt then completed therex per log 1:1 w/PTA x 40 min.     DATE 6/30/17 6/28/17 6/21/17 6/16/17 6/14/17 6/8/17 6/6/17 6/2/17   VISIT 9 8 7 6  4 3 2   POC due 7/10/17 7/10/17 7/10/17 7/10/17 7/10/17 7/10/17 7/10/17 7/10/17   FOTO 9/10 8/10 7/10 6/10 complete 4/5 3/5 2/5   MT 15  15' 13' 15' 20 20 12' 12'   Bike - 5' at end  NT 10' 6  8' 10'   Piriformis str 30"x3 B 30"x3 B 30"x3 B 30"x3 B 30"x3 B 30"x3 B 30"x3 B 30"x3 B   HSS w/ strap 30"X3 B 30"x3 B 30"x3 B 30"x3 30"x3 B 30"X3 B 30"x3 B 30"x3 B   Hip flexor str 30"X3 30"x3 B 30"x3 B 30"x3 30"x3 B  30"x3 B 30"x3 B   Prone quad str 30"x3 B w/ 1/2 FR under knee 30"x3 B w/ 1/2 FR under knee 30"x3 B c 1/2 FR under knee 30"x3 30"x3 B 30"x3 30"x3 B 30"x3 B   Bridges 5" hold 2x15 5" hold 2x15 5" hold 2x15 5" 2x12 2x12 w/5" hold 2x10 c 5" hold 2x10 c 5" hold 2x10 c 5" hold   SL clams RTB 2x15   w/ 5" hold RTB 2x15   w/ 5" hold RTB 2x15   c 5" hold RTB 2x15 2x12 RTB 2x10 RTB 2x10 RTB  2x10 RTB   c 5" hold   Iso hip add 10"x10 10"x10 10"x10 10"x10 10"x10 10"x10 10"x10 10"x10   SL hip abd  --     2x10 B 2x10 B   Steamboats GTC  2x12 B  3 ways on green oval  GTC  2x12 B  3 ways on green oval  grn CLX   2x12 B  3 ways on green oval  GTC 2x10 ea 3 ways on green oval 15x ea GTC  W/green foam pad.  " "3 ways 10x each GTB flex/abd/ext w/ green foam pad 10x each GTB flex/abd/ext w/ green foam pad Next?   SL balance  held 30"x3 R --   - -   Monster walks oot GTC lateral  2x35' grn CLX lat  x 1 lap --       INITIALS JA 2/6 DH 1/6 RB DH 3/6 CS 2/6 JA 1/6 RR RB       Assessment:     Pt responded well to PT session. She states that hip distractions are providing her with Pain relief. Pt tolerated session with no reports of increased pain , no adverse reactions noted.     Patient Education/Response:     Patient educated to continue HEP.  Verbalized understanding.    Plans and Goals:       Cont with PT POC and progress as tolerated    GOALS: Short Term Goals:  4 weeks  1.Report decreased R hip pain  < / =  4-5/10 at worst to increase tolerance for walking.  2. Pt will have normal hip ROM without c/o hip pain in order to perform ADLs without difficulty.  3. Increase strength by 1/3 MMT grade in R LE  to increase tolerance for ADL and work activities.  4. Pt to tolerate HEP to improve ROM and independence with ADL's     Long Term Goals: 8 weeks  1.Report decreased R hip pain < / = 1-2/10  to increase tolerance for walking.  2.Patient goal: To strengthen B LEs in order to decrease hip pain.    3.Increase strength to 4+/5 in  R LE  to increase tolerance for ADL and work activities.  4. Pt will have neg FABERs test on the R        "

## 2017-07-03 DIAGNOSIS — I10 ESSENTIAL HYPERTENSION: ICD-10-CM

## 2017-07-03 RX ORDER — CLONIDINE HYDROCHLORIDE 0.2 MG/1
TABLET ORAL
Qty: 180 TABLET | Refills: 0 | Status: CANCELLED | OUTPATIENT
Start: 2017-07-03

## 2017-07-05 ENCOUNTER — CLINICAL SUPPORT (OUTPATIENT)
Dept: REHABILITATION | Facility: HOSPITAL | Age: 52
End: 2017-07-05
Attending: FAMILY MEDICINE
Payer: MEDICAID

## 2017-07-05 DIAGNOSIS — M25.551 PAIN OF RIGHT HIP JOINT: ICD-10-CM

## 2017-07-05 PROCEDURE — 97110 THERAPEUTIC EXERCISES: CPT | Mod: PN

## 2017-07-05 NOTE — PROGRESS NOTES
"TIME RECORD    Date:  07/05/2017    Start Time:  9:00  Stop Time:  10:00    PROCEDURES:    TIMED  Procedure Min.   Manual therapy 15   Therex 15             Total Timed Minutes:  30  Total Timed Units: 2  Total Untimed Units:  0  Charges Billed/# of units:  2TE    Progress/Current Status    Subjective:     Patient ID: Aggie Rasheed is a 51 y.o. female.  Diagnosis:   1. Pain of right hip joint       Pain:Pt reports she has been having some soreness to R mid lower back down lateral right knee.     Objective:       Pt positioned in left sidelying for 15 min of manual therapy consisting of STM to ITband, STM to lateral Right hamstring, STM gluteal musculature, then in  supine for gentle long axis distraction to R hip.  Pt then completed therex per log 1:1 w / PTA x 10 min, then supervised x 20 min      DATE 7/5/17 6/30/17 6/28/17 6/21/17 6/16/17 6/14/17 6/8/17 6/6/17 6/2/17   VISIT 10 9 8 7 6  4 3 2   POC due 7/10/17 7/10/17 7/10/17 7/10/17 7/10/17 7/10/17 7/10/17 7/10/17 7/10/17   FOTO 10/10 9/10 8/10 7/10 6/10 complete 4/5 3/5 2/5   MT 15 15  15' 13' 15' 20 20 12' 12'   Bike  - 5' at end  NT 10' 6  8' 10'   Piriformis str 30"x3 B 30"x3 B 30"x3 B 30"x3 B 30"x3 B 30"x3 B 30"x3 B 30"x3 B 30"x3 B   HSS w/ strap 30"x3 B 30"X3 B 30"x3 B 30"x3 B 30"x3 30"x3 B 30"X3 B 30"x3 B 30"x3 B   Hip flexor str  30"X3 30"x3 B 30"x3 B 30"x3 30"x3 B  30"x3 B 30"x3 B   Prone quad str 30"x3 B w/ 1/2 FR under knee 30"x3 B w/ 1/2 FR under knee 30"x3 B w/ 1/2 FR under knee 30"x3 B c 1/2 FR under knee 30"x3 30"x3 B 30"x3 30"x3 B 30"x3 B   Bridges  5" hold 2x15 5" hold 2x15 5" hold 2x15 5" 2x12 2x12 w/5" hold 2x10 c 5" hold 2x10 c 5" hold 2x10 c 5" hold   SL clams GTB 2x15   5" hold RTB 2x15   w/ 5" hold RTB 2x15   w/ 5" hold RTB 2x15   c 5" hold RTB 2x15 2x12 RTB 2x10 RTB 2x10 RTB  2x10 RTB   c 5" hold   Iso hip add  10"x10 10"x10 10"x10 10"x10 10"x10 10"x10 10"x10 10"x10   SL hip abd   --     2x10 B 2x10 B   Steamboats GTC  2x12 B  3 ways " "on green oval  GTC  2x12 B  3 ways on green oval  GTC  2x12 B  3 ways on green oval  grn CLX   2x12 B  3 ways on green oval  GTC 2x10 ea 3 ways on green oval 15x ea GTC  W/green foam pad.  3 ways 10x each GTB flex/abd/ext w/ green foam pad 10x each GTB flex/abd/ext w/ green foam pad Next?   SL balance   held 30"x3 R --   - -   Monster walks deferred oot GTC lateral  2x35' grn CLX lat  x 1 lap --       INITIALS JA 3/6 JA 2/6 DH 1/6 RB DH 3/6 CS 2/6 JA 1/6 RR RB       Assessment:     Pt able to complete session with reports of feeling towards end of session during standing therex. She required a seated rest break, BP taken: 123/64, O2 sats: 100% on room air, HR 90 bpm. Pt reports she did not eat breakfast.    Patient Education/Response:     Patient educated to continue HEP.     Plans and Goals:       Cont with PT POC and progress as tolerated    GOALS: Short Term Goals:  4 weeks  1.Report decreased R hip pain  < / =  4-5/10 at worst to increase tolerance for walking.  2. Pt will have normal hip ROM without c/o hip pain in order to perform ADLs without difficulty.  3. Increase strength by 1/3 MMT grade in R LE  to increase tolerance for ADL and work activities.  4. Pt to tolerate HEP to improve ROM and independence with ADL's     Long Term Goals: 8 weeks  1.Report decreased R hip pain < / = 1-2/10  to increase tolerance for walking.  2.Patient goal: To strengthen B LEs in order to decrease hip pain.    3.Increase strength to 4+/5 in  R LE  to increase tolerance for ADL and work activities.  4. Pt will have neg FABERs test on the R        "

## 2017-07-07 ENCOUNTER — CLINICAL SUPPORT (OUTPATIENT)
Dept: REHABILITATION | Facility: HOSPITAL | Age: 52
End: 2017-07-07
Attending: FAMILY MEDICINE
Payer: MEDICAID

## 2017-07-07 ENCOUNTER — OFFICE VISIT (OUTPATIENT)
Dept: FAMILY MEDICINE | Facility: HOSPITAL | Age: 52
End: 2017-07-07
Attending: FAMILY MEDICINE
Payer: MEDICAID

## 2017-07-07 VITALS
DIASTOLIC BLOOD PRESSURE: 51 MMHG | BODY MASS INDEX: 25.28 KG/M2 | HEART RATE: 77 BPM | SYSTOLIC BLOOD PRESSURE: 118 MMHG | WEIGHT: 137.38 LBS | HEIGHT: 62 IN

## 2017-07-07 DIAGNOSIS — H60.90 OTITIS EXTERNA, UNSPECIFIED CHRONICITY, UNSPECIFIED LATERALITY, UNSPECIFIED TYPE: Primary | ICD-10-CM

## 2017-07-07 DIAGNOSIS — D64.9 ANEMIA, UNSPECIFIED TYPE: ICD-10-CM

## 2017-07-07 DIAGNOSIS — E11.9 TYPE 2 DIABETES MELLITUS WITHOUT OPHTHALMIC MANIFESTATIONS: ICD-10-CM

## 2017-07-07 DIAGNOSIS — E11.9 TYPE 2 DIABETES MELLITUS WITHOUT COMPLICATION, WITHOUT LONG-TERM CURRENT USE OF INSULIN: ICD-10-CM

## 2017-07-07 DIAGNOSIS — I10 ESSENTIAL HYPERTENSION: ICD-10-CM

## 2017-07-07 DIAGNOSIS — D68.59 PROTEIN C DEFICIENCY: ICD-10-CM

## 2017-07-07 DIAGNOSIS — E78.5 HYPERLIPIDEMIA, UNSPECIFIED HYPERLIPIDEMIA TYPE: ICD-10-CM

## 2017-07-07 DIAGNOSIS — M25.551 PAIN OF RIGHT HIP JOINT: ICD-10-CM

## 2017-07-07 DIAGNOSIS — G43.009 MIGRAINE WITHOUT AURA AND WITHOUT STATUS MIGRAINOSUS, NOT INTRACTABLE: ICD-10-CM

## 2017-07-07 PROCEDURE — 97110 THERAPEUTIC EXERCISES: CPT | Mod: PN

## 2017-07-07 PROCEDURE — 99214 OFFICE O/P EST MOD 30 MIN: CPT | Performed by: FAMILY MEDICINE

## 2017-07-07 RX ORDER — CLONIDINE HYDROCHLORIDE 0.2 MG/1
0.6 TABLET ORAL 2 TIMES DAILY
Qty: 180 TABLET | Refills: 5 | Status: SHIPPED | OUTPATIENT
Start: 2017-07-07 | End: 2018-01-04 | Stop reason: SDUPTHER

## 2017-07-07 RX ORDER — PROPRANOLOL HYDROCHLORIDE 40 MG/1
40 TABLET ORAL 3 TIMES DAILY
Qty: 90 TABLET | Refills: 5 | Status: SHIPPED | OUTPATIENT
Start: 2017-07-07 | End: 2018-01-16 | Stop reason: SDUPTHER

## 2017-07-07 RX ORDER — FERROUS SULFATE 325(65) MG
1 TABLET ORAL 2 TIMES DAILY
Qty: 90 TABLET | Refills: 3 | Status: SHIPPED | OUTPATIENT
Start: 2017-07-07 | End: 2018-01-16

## 2017-07-07 RX ORDER — ACETIC ACID 20.65 MG/ML
4 SOLUTION AURICULAR (OTIC) 3 TIMES DAILY
Qty: 6 ML | Refills: 0 | Status: SHIPPED | OUTPATIENT
Start: 2017-07-07 | End: 2017-07-17

## 2017-07-07 RX ORDER — METFORMIN HYDROCHLORIDE 1000 MG/1
1000 TABLET ORAL 2 TIMES DAILY
Qty: 120 TABLET | Refills: 2 | Status: SHIPPED | OUTPATIENT
Start: 2017-07-07 | End: 2018-01-16 | Stop reason: SDUPTHER

## 2017-07-07 RX ORDER — BUTALBITAL, ACETAMINOPHEN AND CAFFEINE 50; 325; 40 MG/1; MG/1; MG/1
1 TABLET ORAL DAILY PRN
Qty: 15 TABLET | Refills: 0 | Status: SHIPPED | OUTPATIENT
Start: 2017-07-07 | End: 2018-01-16 | Stop reason: SDUPTHER

## 2017-07-07 RX ORDER — SIMVASTATIN 40 MG/1
40 TABLET, FILM COATED ORAL DAILY
Qty: 90 TABLET | Refills: 1 | Status: SHIPPED | OUTPATIENT
Start: 2017-07-07 | End: 2018-01-16 | Stop reason: SDUPTHER

## 2017-07-07 RX ORDER — GABAPENTIN 600 MG/1
1200 TABLET ORAL 3 TIMES DAILY
Qty: 180 TABLET | Refills: 3 | Status: SHIPPED | OUTPATIENT
Start: 2017-07-07 | End: 2017-12-22 | Stop reason: SDUPTHER

## 2017-07-07 RX ORDER — LOSARTAN POTASSIUM 25 MG/1
25 TABLET ORAL DAILY
Qty: 90 TABLET | Refills: 1 | Status: SHIPPED | OUTPATIENT
Start: 2017-07-07 | End: 2018-01-16 | Stop reason: SDUPTHER

## 2017-07-07 RX ORDER — GLIPIZIDE 5 MG/1
10 TABLET ORAL DAILY
Qty: 60 TABLET | Refills: 5 | Status: SHIPPED | OUTPATIENT
Start: 2017-07-07 | End: 2018-01-16 | Stop reason: SDUPTHER

## 2017-07-07 NOTE — PROGRESS NOTES
"TIME RECORD    Date:  07/07/2017    Start Time:  10:00  Stop Time:  10:45    PROCEDURES:    TIMED  Procedure Min.   Manual therapy 15   Therex 30             Total Timed Minutes:  45  Total Timed Units: 3  Total Untimed Units:  0  Charges Billed/# of units:  3TE  Progress/Current Status    Subjective:     Patient ID: Aggie Rasheed is a 51 y.o. female.  Diagnosis:   1. Pain of right hip joint       Pain:Pt reports she has been having some soreness to R mid lower back down lateral right knee today. She states she has an MD appt today at 11 am and requested to leave early on arrive on time. Pt agreeable to shortened session.     Objective:       Pt positioned in left sidelying for 15 min of manual therapy consisting of STM to ITband, STM to lateral Right hamstring, STM gluteal musculature, then in  supine for gentle long axis distraction to R hip.  Pt then completed therex per log 1:1 w / PTA x 10 min,    DATE 7/7/17 7/5/17 6/30/17 6/28/17 6/21/17 6/16/17 6/14/17 6/8/17 6/6/17 6/2/17   VISIT 11 10 9 8 7 6  4 3 2   POC due 7/10/17 7/10/17 7/10/17 7/10/17 7/10/17 7/10/17 7/10/17 7/10/17 7/10/17 7/10/17   FOTO NEXT 10/10 9/10 8/10 7/10 6/10 complete 4/5 3/5 2/5   MT 15 15 15  15' 13' 15' 20 20 12' 12'   Bike   - 5' at end  NT 10' 6  8' 10'   Piriformis str 30"x 3 B 30"x3 B 30"x3 B 30"x3 B 30"x3 B 30"x3 B 30"x3 B 30"x3 B 30"x3 B 30"x3 B   HSS w/ strap 30"x 3 B 30"x3 B 30"X3 B 30"x3 B 30"x3 B 30"x3 30"x3 B 30"X3 B 30"x3 B 30"x3 B   Hip flexor str   30"X3 30"x3 B 30"x3 B 30"x3 30"x3 B  30"x3 B 30"x3 B   Prone quad str 30"x 3 B w/1/2 FR under knee 30"x3 B w/ 1/2 FR under knee 30"x3 B w/ 1/2 FR under knee 30"x3 B w/ 1/2 FR under knee 30"x3 B c 1/2 FR under knee 30"x3 30"x3 B 30"x3 30"x3 B 30"x3 B   Bridges oot  5" hold 2x15 5" hold 2x15 5" hold 2x15 5" 2x12 2x12 w/5" hold 2x10 c 5" hold 2x10 c 5" hold 2x10 c 5" hold   SL clams GTB 2x15  5" hold GTB 2x15   5" hold RTB 2x15   w/ 5" hold RTB 2x15   w/ 5" hold RTB 2x15   c 5" " "hold RTB 2x15 2x12 RTB 2x10 RTB 2x10 RTB  2x10 RTB   c 5" hold   Iso hip add   10"x10 10"x10 10"x10 10"x10 10"x10 10"x10 10"x10 10"x10   SL hip abd    --     2x10 B 2x10 B   Steamboats oot GTC  2x12 B  3 ways on green oval  GTC  2x12 B  3 ways on green oval  GTC  2x12 B  3 ways on green oval  grn CLX   2x12 B  3 ways on green oval  GTC 2x10 ea 3 ways on green oval 15x ea GTC  W/green foam pad.  3 ways 10x each GTB flex/abd/ext w/ green foam pad 10x each GTB flex/abd/ext w/ green foam pad Next?   SL balance    held 30"x3 R --   - -   Monster walks oot deferred oot GTC lateral  2x35' grn CLX lat  x 1 lap --       INITIALS JA 4/6 JA 3/6 JA 2/6 DH 1/6 RB DH 3/6 CS 2/6 JA 1/6 RR RB       Assessment:     Pt tolerated session with no reports of increased r mid lower back pain. Session shortened today due to her MD appointment at 11 am.     Patient Education/Response:     Patient educated to continue HEP.     Plans and Goals:       Cont with PT POC and progress as tolerated    GOALS: Short Term Goals:  4 weeks  1.Report decreased R hip pain  < / =  4-5/10 at worst to increase tolerance for walking.  2. Pt will have normal hip ROM without c/o hip pain in order to perform ADLs without difficulty.  3. Increase strength by 1/3 MMT grade in R LE  to increase tolerance for ADL and work activities.  4. Pt to tolerate HEP to improve ROM and independence with ADL's     Long Term Goals: 8 weeks  1.Report decreased R hip pain < / = 1-2/10  to increase tolerance for walking.  2.Patient goal: To strengthen B LEs in order to decrease hip pain.    3.Increase strength to 4+/5 in  R LE  to increase tolerance for ADL and work activities.  4. Pt will have neg FABERs test on the R        "

## 2017-07-07 NOTE — PROGRESS NOTES
I assume primary medical responsibility for this patient, I have reviewed the case history, findings, diagnosis and treatment plan with the resident and agree that the care is reasonable and necessary. This service has been performed by a resident without the presence of a teaching physician under the primary care exception  Olive Childs  7/7/2017

## 2017-07-07 NOTE — PROGRESS NOTES
Subjective:       Patient ID: Aggie Rasheed is a 51 y.o. female.    Chief Complaint: Follow-up and Medication Refill    Ms. Rasheed is a 51 year old female with HTN, DMII, SLE, iron def anemia, GERD who presents for follow-up. BP has been well controlled with meds and diet. DMII well-controlled with metformin and glipizide. Recently diagnosed with SLE by rheumatologist. States she is going to see a hematologist to adjust her blood thinner. Currently taking Xarelto. Reflux has been well controlled with Omeprazole. Has been taking iron BID, recent colonoscopy did not show reason for anemia. Will have pill study to eval small intestine.         Medication Refill   Associated symptoms include headaches. Pertinent negatives include no arthralgias, chest pain, chills, congestion, coughing, fever, myalgias, nausea, rash, sore throat or vomiting.     Review of Systems   Constitutional: Negative for chills and fever.   HENT: Negative for congestion and sore throat.    Eyes: Negative for pain and redness.   Respiratory: Negative for cough and shortness of breath.    Cardiovascular: Negative for chest pain and palpitations.   Gastrointestinal: Negative for diarrhea, nausea and vomiting.   Genitourinary: Negative for difficulty urinating and dysuria.   Musculoskeletal: Negative for arthralgias and myalgias.   Skin: Negative for rash.   Neurological: Positive for headaches. Negative for syncope.   Psychiatric/Behavioral: Negative for agitation and confusion.       Objective:      Vitals:    07/07/17 1129   BP: (!) 118/51   Pulse: 77     Physical Exam   Constitutional: She is oriented to person, place, and time. She appears well-developed and well-nourished. No distress.   HENT:   Head: Normocephalic and atraumatic.   Right Ear: External ear normal.   Left Ear: External ear normal.   Erythematous external canal with pinna tenderness.    Eyes: Conjunctivae are normal. Pupils are equal, round, and reactive to light. Right eye  exhibits no discharge. Left eye exhibits no discharge.   Neck: Normal range of motion. Neck supple.   Cardiovascular: Normal rate and regular rhythm.  Exam reveals no gallop and no friction rub.    No murmur heard.  Pulmonary/Chest: Effort normal and breath sounds normal. No respiratory distress. She has no wheezes. She has no rales.   Abdominal: Soft. Bowel sounds are normal. She exhibits no distension. There is no tenderness.   Musculoskeletal: Normal range of motion.   Neurological: She is alert and oriented to person, place, and time.   Skin: Skin is warm. No rash noted. No erythema.   Psychiatric: She has a normal mood and affect. Her behavior is normal.       Assessment:       1. Otitis externa, unspecified chronicity, unspecified laterality, unspecified type    2. Migraine without aura and without status migrainosus, not intractable    3. Type 2 diabetes mellitus without ophthalmic manifestations    4. Anemia, unspecified type    5. Essential hypertension    6. Protein C deficiency    7. Hyperlipidemia, unspecified hyperlipidemia type    8. Type 2 diabetes mellitus without complication, without long-term current use of insulin        Plan:       Otitis externa, unspecified chronicity, unspecified laterality, unspecified type  -     acetic acid (VOSOL) 2 % otic solution; Place 4 drops into the left ear 3 (three) times daily.  Dispense: 6 mL; Refill: 0    Migraine without aura and without status migrainosus, not intractable  -     butalbital-acetaminophen-caffeine -40 mg (FIORICET, ESGIC) -40 mg per tablet; Take 1 tablet by mouth daily as needed.  Dispense: 15 tablet; Refill: 0  -     propranolol (INDERAL) 40 MG tablet; Take 1 tablet (40 mg total) by mouth 3 (three) times daily.  Dispense: 90 tablet; Refill: 5    Type 2 diabetes mellitus without ophthalmic manifestations  -     gabapentin (NEURONTIN) 600 MG tablet; Take 2 tablets (1,200 mg total) by mouth 3 (three) times daily.  Dispense: 180  tablet; Refill: 3    Anemia, unspecified type  -     ferrous sulfate 325 mg (65 mg iron) Tab tablet; Take 1 tablet (325 mg total) by mouth 2 (two) times daily.  Dispense: 90 tablet; Refill: 3  -     CBC auto differential; Future; Expected date: 07/07/2017  -     Comprehensive metabolic panel; Future; Expected date: 07/07/2017    Essential hypertension  -     cloNIDine (CATAPRES) 0.2 MG tablet; Take 3 tablets (0.6 mg total) by mouth 2 (two) times daily.  Dispense: 180 tablet; Refill: 5  -     losartan (COZAAR) 25 MG tablet; Take 1 tablet (25 mg total) by mouth once daily.  Dispense: 90 tablet; Refill: 1  -     Comprehensive metabolic panel; Future; Expected date: 07/07/2017    Protein C deficiency  -     rivaroxaban (XARELTO) 20 mg Tab; Take 1 tablet (20 mg total) by mouth daily with dinner or evening meal.  Dispense: 30 tablet; Refill: 2    Hyperlipidemia, unspecified hyperlipidemia type  -     simvastatin (ZOCOR) 40 MG tablet; Take 1 tablet (40 mg total) by mouth once daily.  Dispense: 90 tablet; Refill: 1    Type 2 diabetes mellitus without complication, without long-term current use of insulin  -     metformin (GLUCOPHAGE) 1000 MG tablet; Take 1 tablet (1,000 mg total) by mouth 2 (two) times daily.  Dispense: 120 tablet; Refill: 2  -     glipiZIDE (GLUCOTROL) 5 MG tablet; Take 2 tablets (10 mg total) by mouth once daily.  Dispense: 60 tablet; Refill: 5  -     Hemoglobin A1c; Future; Expected date: 07/07/2017  -     Comprehensive metabolic panel; Future; Expected date: 07/07/2017    Return in one month for re-eval and hip pain if it continues. F/U on blood thinner and hematology. CBC, A1C and CMP.     Return in about 1 month (around 8/7/2017).

## 2017-07-24 ENCOUNTER — HOSPITAL ENCOUNTER (OUTPATIENT)
Facility: HOSPITAL | Age: 52
Discharge: HOME | End: 2017-07-24
Attending: INTERNAL MEDICINE | Admitting: INTERNAL MEDICINE
Payer: MEDICAID

## 2017-07-24 ENCOUNTER — SURGERY (OUTPATIENT)
Age: 52
End: 2017-07-24

## 2017-07-24 DIAGNOSIS — D50.9 IRON DEFICIENCY ANEMIA, UNSPECIFIED IRON DEFICIENCY ANEMIA TYPE: Primary | ICD-10-CM

## 2017-07-24 DIAGNOSIS — D50.0 IRON DEFICIENCY ANEMIA DUE TO CHRONIC BLOOD LOSS: ICD-10-CM

## 2017-07-24 PROCEDURE — 27200952 HC CAPSULE DELIVERY DEVICE: Performed by: INTERNAL MEDICINE

## 2017-07-24 NOTE — DISCHARGE INSTRUCTIONS
Pill Capsule Discharge Instructions:    You may drink colorless liquids starting 2 hours after swallowing the capsule.    You may have a light snack (such as a sandwich) starting 4 hours after swallowing the capsule.    Check the blue flashing light frequently-be sure it is blinking- if it stops blinking or changes color call the Endoscopy Department at [phone].    Avoid Strong Magnetic fields such as MRI devices after swallowing the capsule until you pass it in a bowel movement. Note that the capsule is flushable and does not need to be retrieved after you pass the capsule.    Do not disconnect the equipment or completely remove the belt at any time during the procedure. Treat the data recorder carefully-avoid sudden movements and banging of the equipment.    Avoid direct exposure to bright sunlight.      Remove the belt and recorder in 8 hrs @ ____________.      Return the belt and recorder to the 2nd floor of the hospital at the outpatient surgery desk @ _________.

## 2017-07-24 NOTE — OR NURSING
Patient given instructions by Dr. Funk, consent signed  Reviewed instruction with return time with patient, verbalized understanding

## 2017-07-27 PROBLEM — D50.0 IRON DEFICIENCY ANEMIA DUE TO CHRONIC BLOOD LOSS: Status: ACTIVE | Noted: 2017-06-19

## 2017-08-08 ENCOUNTER — TELEPHONE (OUTPATIENT)
Dept: NEUROLOGY | Facility: HOSPITAL | Age: 52
End: 2017-08-08

## 2017-08-09 NOTE — TELEPHONE ENCOUNTER
Patient called with capsule results. H/H now normal on oral iron.   Recommend:   Continue bid oral iron x 3 months total then a multivitamin with low dose iron after that (indefinitely)

## 2017-08-25 ENCOUNTER — OFFICE VISIT (OUTPATIENT)
Dept: FAMILY MEDICINE | Facility: HOSPITAL | Age: 52
End: 2017-08-25
Attending: FAMILY MEDICINE
Payer: MEDICAID

## 2017-08-25 VITALS
SYSTOLIC BLOOD PRESSURE: 122 MMHG | WEIGHT: 139.13 LBS | BODY MASS INDEX: 25.6 KG/M2 | DIASTOLIC BLOOD PRESSURE: 62 MMHG | RESPIRATION RATE: 20 BRPM | HEART RATE: 72 BPM | HEIGHT: 62 IN

## 2017-08-25 DIAGNOSIS — E11.8 DIABETIC FOOT: ICD-10-CM

## 2017-08-25 DIAGNOSIS — Z12.31 SCREENING MAMMOGRAM, ENCOUNTER FOR: ICD-10-CM

## 2017-08-25 DIAGNOSIS — H65.191 ACUTE MEE (MIDDLE EAR EFFUSION), RIGHT: Primary | ICD-10-CM

## 2017-08-25 DIAGNOSIS — L30.9 DERMATITIS: ICD-10-CM

## 2017-08-25 DIAGNOSIS — E11.9 TYPE 2 DIABETES MELLITUS WITHOUT OPHTHALMIC MANIFESTATIONS: ICD-10-CM

## 2017-08-25 PROCEDURE — 99215 OFFICE O/P EST HI 40 MIN: CPT | Performed by: FAMILY MEDICINE

## 2017-08-25 NOTE — PROGRESS NOTES
Subjective:       Patient ID: Aggie Rasheed is a 52 y.o. female.    Chief Complaint: Follow-up and Otalgia    Ms. Rasheed is a 52 year old female who comes to clinic for ear complaint and DM. Patient reports she feels like she has fluid in her R ear. States she tried the acetic acid drops on her L ear and it burned. Patient States she has had decreased hearing and has a family history of hearing loss. States it also feels swollen. Denies F/chills or ear pain. Never seen an ENT doctor. No recent flying or barotrauma. Also reports her BS's have been 150-180's, has been taking her Metformin and Glipizide (10mg). Denies neuropathies in feet but would like to see a podiatrist. Has not had mammogram or eye exam. Will see her Rheumatologist next month.       Review of Systems   Constitutional: Negative for chills and fever.   HENT: Positive for ear discharge and hearing loss. Negative for congestion and sore throat.    Eyes: Negative for pain and redness.   Respiratory: Negative for cough and shortness of breath.    Cardiovascular: Negative for chest pain and palpitations.   Gastrointestinal: Negative for diarrhea, nausea and vomiting.   Genitourinary: Negative for difficulty urinating and dysuria.   Musculoskeletal: Negative for arthralgias and myalgias.   Skin: Negative for rash.   Neurological: Negative for syncope and headaches.   Psychiatric/Behavioral: Negative for agitation and confusion.       Objective:      Vitals:    08/25/17 0921   BP: 122/62   Pulse: 72   Resp: 20     Physical Exam   Constitutional: She is oriented to person, place, and time. She appears well-developed and well-nourished. No distress.   HENT:   Head: Normocephalic and atraumatic.   Right Ear: External ear normal.   Left Ear: External ear normal.   TM's with good light reflex, fluid behind R ear, tuning fork localized to R side consistent with effusion. No erythema or sign of infection. L canal is erythematous and scaling.    Eyes:  Conjunctivae are normal. Pupils are equal, round, and reactive to light. Right eye exhibits no discharge. Left eye exhibits no discharge.   Neck: Normal range of motion. No JVD present. No thyromegaly present.   Cardiovascular: Normal rate and regular rhythm.  Exam reveals no gallop and no friction rub.    No murmur heard.  Pulmonary/Chest: Effort normal and breath sounds normal. No respiratory distress. She has no wheezes. She has no rales.   Abdominal: Soft. Bowel sounds are normal. She exhibits no distension. There is no tenderness.   Musculoskeletal: Normal range of motion.   Neurological: She is alert and oriented to person, place, and time. She has normal reflexes.   Skin: Skin is warm. No rash noted. No erythema.   Psychiatric: She has a normal mood and affect. Her behavior is normal.       Assessment:       1. Acute BÁRBARA (middle ear effusion), right    2. Dermatitis    3. Type 2 diabetes mellitus without ophthalmic manifestations    4. Screening mammogram, encounter for    5. Diabetic foot        Plan:       Acute BÁRBARA (middle ear effusion), right  -     Ambulatory referral to ENT    Dermatitis  -     Ambulatory referral to ENT    Type 2 diabetes mellitus without ophthalmic manifestations  -     Ambulatory referral to Ophthalmology  -     Ambulatory referral to Podiatry    Screening mammogram, encounter for  -     Mammo Digital Screening Bilateral With CAD; Future; Expected date: 08/25/2017    Diabetic foot  -     Ambulatory referral to Podiatry    Patient cannot afford steroid ear drops at this time. Will send to ENT for evaluation. May need audiogram as well.   Return in about 3 months (around 11/25/2017).

## 2017-08-28 ENCOUNTER — HOSPITAL ENCOUNTER (OUTPATIENT)
Dept: RADIOLOGY | Facility: HOSPITAL | Age: 52
Discharge: HOME | End: 2017-08-28
Attending: FAMILY MEDICINE
Payer: MEDICAID

## 2017-08-28 DIAGNOSIS — Z12.31 SCREENING MAMMOGRAM, ENCOUNTER FOR: ICD-10-CM

## 2017-08-28 PROCEDURE — 77067 SCR MAMMO BI INCL CAD: CPT | Mod: TC

## 2017-08-28 PROCEDURE — 77067 SCR MAMMO BI INCL CAD: CPT | Mod: 26,,, | Performed by: RADIOLOGY

## 2017-08-28 PROCEDURE — 77063 BREAST TOMOSYNTHESIS BI: CPT | Mod: 26,,, | Performed by: RADIOLOGY

## 2017-08-28 NOTE — PROGRESS NOTES
I assume primary medical responsibility for this patient, I have reviewed the case history, findings, diagnosis and treatment plan with the resident and agree that the care is reasonable and necessary. This service has been performed by a resident without the presence of a teaching physician under the primary care exception  Olive Childs  8/28/2017

## 2017-09-26 ENCOUNTER — DOCUMENTATION ONLY (OUTPATIENT)
Dept: REHABILITATION | Facility: HOSPITAL | Age: 52
End: 2017-09-26

## 2017-09-26 DIAGNOSIS — M25.551 PAIN OF RIGHT HIP JOINT: ICD-10-CM

## 2017-09-26 NOTE — PT/OT/SLP DISCHARGE
Aggie Rasheed  has not been to PT since 7/7/17 and is therefore discharged from PT.  Please see the last note for pt's most recent functional status.    Veronica Weinstein, PT, DPT

## 2017-10-30 ENCOUNTER — HOSPITAL ENCOUNTER (EMERGENCY)
Facility: HOSPITAL | Age: 52
Discharge: HOME | End: 2017-10-30
Attending: EMERGENCY MEDICINE
Payer: MEDICAID

## 2017-10-30 VITALS
TEMPERATURE: 99 F | HEIGHT: 62 IN | DIASTOLIC BLOOD PRESSURE: 75 MMHG | HEART RATE: 68 BPM | SYSTOLIC BLOOD PRESSURE: 192 MMHG | OXYGEN SATURATION: 99 % | RESPIRATION RATE: 18 BRPM | BODY MASS INDEX: 26.68 KG/M2 | WEIGHT: 145 LBS

## 2017-10-30 DIAGNOSIS — Z23 TETANUS-DIPHTHERIA (TD) VACCINATION: ICD-10-CM

## 2017-10-30 DIAGNOSIS — S62.662B OPEN NONDISPLACED FRACTURE OF DISTAL PHALANX OF RIGHT MIDDLE FINGER, INITIAL ENCOUNTER: Primary | ICD-10-CM

## 2017-10-30 LAB
GLUCOSE SERPL-MCNC: 69 MG/DL (ref 70–110)
POCT GLUCOSE: 69 MG/DL (ref 70–110)

## 2017-10-30 PROCEDURE — 25000003 PHARM REV CODE 250: Performed by: NURSE PRACTITIONER

## 2017-10-30 PROCEDURE — 90715 TDAP VACCINE 7 YRS/> IM: CPT | Performed by: NURSE PRACTITIONER

## 2017-10-30 PROCEDURE — 29130 APPL FINGER SPLINT STATIC: CPT | Mod: F7

## 2017-10-30 PROCEDURE — 63600175 PHARM REV CODE 636 W HCPCS: Performed by: NURSE PRACTITIONER

## 2017-10-30 PROCEDURE — 90471 IMMUNIZATION ADMIN: CPT | Performed by: NURSE PRACTITIONER

## 2017-10-30 PROCEDURE — 96372 THER/PROPH/DIAG INJ SC/IM: CPT

## 2017-10-30 PROCEDURE — 82962 GLUCOSE BLOOD TEST: CPT

## 2017-10-30 PROCEDURE — 12001 RPR S/N/AX/GEN/TRNK 2.5CM/<: CPT | Mod: F7

## 2017-10-30 PROCEDURE — 99284 EMERGENCY DEPT VISIT MOD MDM: CPT | Mod: 25

## 2017-10-30 RX ORDER — OXYCODONE AND ACETAMINOPHEN 5; 325 MG/1; MG/1
1 TABLET ORAL
Status: COMPLETED | OUTPATIENT
Start: 2017-10-30 | End: 2017-10-30

## 2017-10-30 RX ORDER — OXYCODONE AND ACETAMINOPHEN 5; 325 MG/1; MG/1
1 TABLET ORAL EVERY 4 HOURS PRN
Qty: 12 TABLET | Refills: 0 | Status: SHIPPED | OUTPATIENT
Start: 2017-10-30 | End: 2018-01-16

## 2017-10-30 RX ORDER — LIDOCAINE HYDROCHLORIDE 10 MG/ML
10 INJECTION INFILTRATION; PERINEURAL ONCE
Status: COMPLETED | OUTPATIENT
Start: 2017-10-30 | End: 2017-10-30

## 2017-10-30 RX ORDER — CEFAZOLIN SODIUM 1 G/3ML
1 INJECTION, POWDER, FOR SOLUTION INTRAMUSCULAR; INTRAVENOUS
Status: COMPLETED | OUTPATIENT
Start: 2017-10-30 | End: 2017-10-30

## 2017-10-30 RX ORDER — CEPHALEXIN 500 MG/1
500 CAPSULE ORAL EVERY 6 HOURS
Qty: 20 CAPSULE | Refills: 0 | Status: SHIPPED | OUTPATIENT
Start: 2017-10-30 | End: 2017-11-06

## 2017-10-30 RX ADMIN — OXYCODONE AND ACETAMINOPHEN 1 TABLET: 5; 325 TABLET ORAL at 02:10

## 2017-10-30 RX ADMIN — CEFAZOLIN 1 G: 330 INJECTION, POWDER, FOR SOLUTION INTRAMUSCULAR; INTRAVENOUS at 03:10

## 2017-10-30 RX ADMIN — LIDOCAINE HYDROCHLORIDE 10 ML: 10 INJECTION, SOLUTION INFILTRATION; PERINEURAL at 02:10

## 2017-10-30 RX ADMIN — CLOSTRIDIUM TETANI TOXOID ANTIGEN (FORMALDEHYDE INACTIVATED), CORYNEBACTERIUM DIPHTHERIAE TOXOID ANTIGEN (FORMALDEHYDE INACTIVATED), BORDETELLA PERTUSSIS TOXOID ANTIGEN (GLUTARALDEHYDE INACTIVATED), BORDETELLA PERTUSSIS FILAMENTOUS HEMAGGLUTININ ANTIGEN (FORMALDEHYDE INACTIVATED), BORDETELLA PERTUSSIS PERTACTIN ANTIGEN, AND BORDETELLA PERTUSSIS FIMBRIAE 2/3 ANTIGEN 0.5 ML: 5; 2; 2.5; 5; 3; 5 INJECTION, SUSPENSION INTRAMUSCULAR at 02:10

## 2017-10-30 RX ADMIN — BACITRACIN ZINC, NEOMYCIN SULFATE, POLYMYXIN B SULFATE 1 EACH: 3.5; 5000; 4 OINTMENT TOPICAL at 04:10

## 2017-10-30 NOTE — ED PROVIDER NOTES
Encounter Date: 10/30/2017       History     Chief Complaint   Patient presents with    Laceration     laceration to [employer] 3 rd digit with small chainsaw about 45 min ago. straight laceration in semi [place] around tip of finger near nailbed and pad of finger, bleeding controlled     52-year-old female with past medical history of diabetes, hyperlipidemia, hypertension, & lupus is here for a right long finger laceration.  Pt reports she was using an electric  when she accidentally got her right long finger in the blades.  She is on anticoagulants, but the bleeding is controlled.  There is a laceration at the distal segment of right #3 finger.  She is unsure if her Td is UTD.  She denies numbness and tingling.        The history is provided by the patient.   Hand Injury    The incident occurred just prior to arrival. The incident occurred at home. Injury mechanism: laceration. The pain is present in the right fingers. The quality of the pain is described as aching. The pain is at a severity of 8/10. The pain has been constant since the incident. Pertinent negatives include no fever. She reports no foreign bodies present. The symptoms are aggravated by movement and palpation. She has tried rest for the symptoms. The treatment provided no relief.     Review of patient's allergies indicates:   Allergen Reactions    Niacin preparations Other (See Comments)     Red inflammed eyes    Codeine Rash     Past Medical History:   Diagnosis Date    Diabetes mellitus type II     Headache associated with hormonal factors     Hyperlipidemia     Hypertension     Lupus     Protein C deficiency     Tachycardia      Past Surgical History:   Procedure Laterality Date    AORTIC VALVE SURGERY       SECTION      COLONOSCOPY N/A 2017    Procedure: COLONOSCOPY;  Surgeon: Markos Calvert MD;  [facility];  Service: Endoscopy;  Laterality: N/A;    TOE AMPUTATION      TONSILLECTOMY      WRIST  FRACTURE SURGERY       Family History   Problem Relation Age of Onset    Hypertension Mother     Cancer Father     Diabetes Maternal Uncle     Stroke Maternal Grandfather     Colon cancer Paternal Aunt      Social History   Substance Use Topics    Smoking status: Current Every Day Smoker     Packs/day: 1.00     Types: Cigarettes    Smokeless tobacco: Never Used    Alcohol use No     Review of Systems   Constitutional: Negative for chills and fever.   HENT: Negative for congestion.    Respiratory: Negative for cough.    Cardiovascular: Negative for chest pain.   Gastrointestinal: Negative for vomiting.   Musculoskeletal: Positive for arthralgias. Negative for joint swelling.   Skin: Positive for wound.   Allergic/Immunologic: Positive for immunocompromised state.   Neurological: Negative for weakness and numbness.   Hematological: Bruises/bleeds easily.   Psychiatric/Behavioral: Negative for confusion and self-injury.       Physical Exam     Initial Vitals [10/30/17 1358]   BP Pulse Resp Temp SpO2   (!) 146/76 78 18 97.8 °F (36.6 °C) 98 %      MAP       99.33         Physical Exam    Nursing note and vitals reviewed.  Constitutional: Vital signs are normal. She appears well-developed and well-nourished. She is active and cooperative.  Non-toxic appearance. She does not have a sickly appearance. She does not appear ill.   HENT:   Head: Normocephalic and atraumatic.   Eyes: Conjunctivae and EOM are normal.   Neck: Normal range of motion. Neck supple.   Cardiovascular: Normal rate and regular rhythm.   Pulmonary/Chest: Effort normal and breath sounds normal.   Abdominal: Soft. Normal appearance and bowel sounds are normal.   Musculoskeletal:        Right hand: She exhibits tenderness, bony tenderness and laceration. She exhibits normal range of motion, normal two-point discrimination, normal capillary refill, no deformity and no swelling. Normal sensation noted. Normal strength noted.         Hands:  Neurological: She is alert and oriented to person, place, and time. She has normal strength. GCS eye subscore is 4. GCS verbal subscore is 5. GCS motor subscore is 6.   Skin: Skin is warm and dry. Laceration noted. No rash noted.   Psychiatric: She has a normal mood and affect. Her speech is normal and behavior is normal. Judgment and thought content normal. Cognition and memory are normal.         ED Course   Lac Repair  Date/Time: 10/30/2017 4:06 PM  Performed by: HOMER SALVADOR  Authorized by: MATTHEW GODOY   Consent Done: Yes  Consent: Verbal consent obtained. Written consent not obtained.  Risks and benefits: risks, benefits and alternatives were discussed  Consent given by: patient  Patient understanding: patient states understanding of the procedure being performed  Patient consent: the patient's understanding of the procedure matches consent given  Imaging studies: imaging studies available  Required items: required blood products, implants, devices, and special equipment available  Patient identity confirmed: verbally with patient  Body area: upper extremity  Location details: right long finger  Laceration length: 2 cm  Foreign bodies: no foreign bodies  Tendon involvement: none  Nerve involvement: none  Vascular damage: no  Anesthesia: digital block    Anesthesia:  Anesthetic total: 6 mL  Patient sedated: no  Preparation: Patient was prepped and draped in the usual sterile fashion.  Irrigation solution: saline  Irrigation method: tap  Amount of cleaning: standard  Debridement: none  Degree of undermining: none  Skin closure: 5-0 Prolene  Number of sutures: 3  Technique: simple  Approximation: close  Approximation difficulty: simple  Dressing: dressing applied, antibiotic ointment and splint for protection  Patient tolerance: Patient tolerated the procedure well with no immediate complications    Splint Application  Date/Time: 10/30/2017 4:15 PM  Performed by: HOMER SALVADOR  N.  Authorized by: MATTHEW GODOY   Consent Done: Yes  Consent: Verbal consent obtained. Written consent not obtained.  Risks and benefits: risks, benefits and alternatives were discussed  Consent given by: patient  Patient understanding: patient states understanding of the procedure being performed  Patient consent: the patient's understanding of the procedure matches consent given  Procedure consent: procedure consent matches procedure scheduled  Imaging studies: imaging studies available  Patient identity confirmed: verbally with patient  Location details: right long finger  Splint type: static finger (metal frog splint)  Supplies used: aluminum splint  Post-procedure: The splinted body part was neurovascularly unchanged following the procedure.  Patient tolerance: Patient tolerated the procedure well with no immediate complications        Labs Reviewed - No data to display     Imaging Results          X-Ray Finger 2 or More Views Right (Final result)  Result time 10/30/17 15:10:18    Final result by Alexandr Meza MD (10/30/17 15:10:18)                 Impression:        Third distal phalanx tuft probable acute nondisplaced fracture, with associated overlying laceration at the level of the nailbed, as above. Correlate for depth of laceration and also potential injury to the nailbed.      Electronically signed by: ALEXANDR MEZA MD, MD  Date:     10/30/17  Time:    15:10              Narrative:    COMPARISON: Bilateral hand series 1/31/17    FINDINGS: 3 views right third digit.      There is focal skin defect suggesting laceration at the radial and palmar aspect of the third digit tip at the level of the nailbed. There is radiolucency suggesting nondisplaced fracture of the tuft of the third distal phalanx. No dislocation.  Chronic deformity of the distal radius similar to prior. The joint spaces appear relatively maintained.   No subcutaneous emphysema or radiodense retained foreign body.                                    Medical Decision Making:   Initial Assessment:   53yo female with pmhx of DM and Lupus here for a right #3 finger laceration sustained from an electric .  He patient is diabetic and is on immunosuppressants for her lupus.  She is also on anticoagulants.  She appears well, nontoxic.  Vital stable.  There is a laceration to the distal segment of the left #3 finger through the nail.   Sensation and strength intact.  Bleeding controlled.  No visualized foreign bodies.  Differential Diagnosis:   Laceration, open fracture, tetanus vaccination, foreign body  Clinical Tests:   Radiological Study: Ordered and Reviewed  ED Management:  Xray finger, Adacel, PO percocet, suture closure, IM ancef  X-ray reveals athird distal phalanx tuft fracture which is nondisplaced.  No visualized foreign body.  The patient has a hand surgeon who she will follow up with on November 8, 2017 at 10:30 AM.  We are on Ortho diversion in the ED today.  I reviewed signs of infection, suture care, and return precautions.  The patient verbalized understanding, compliance, and agreement with the treatment plan.  Rx Keflex and Percocet.              Attending Attestation:     Physician Attestation Statement for NP/PA:   I have conducted a face to face encounter with this patient in addition to the NP/PA, due to Medical Complexity    Other NP/PA Attestation Additions:    History of Present Illness: 53 y/o F with R 3rd digit laceration sustained with electric .     Physical Exam: Laceration of $R 3rd digit with +nail involvement and very small fracture of distal phalanx noted on xray. Good perfusion of tip of digit with decreased sensation of ulnar aspect of area distal to wound with CR<2 and full ROM.     Medical Decision Making: Wound washed and repaired, abx given and f/u with hand surgery arranged.  Discussed wound precautions with patient and importance of followup.                   ED Course      Clinical  Impression:   The primary encounter diagnosis was Open nondisplaced fracture of distal phalanx of right middle finger, initial encounter. A diagnosis of Tetanus-diphtheria (Td) vaccination was also pertinent to this visit.    Disposition:   Disposition: Discharged  Condition: Stable                        YANDY Kumari  10/30/17 1611       YANDY Kumari  10/30/17 6740       Lindsay Noguera MD  10/30/17 1652

## 2017-10-30 NOTE — ED TRIAGE NOTES
Pt states was trimming hedges and cut tip of R 3rd digit while trying to un-jam unit. Pt states on blood thinners.

## 2017-12-21 ENCOUNTER — TELEPHONE (OUTPATIENT)
Dept: PHARMACY | Facility: CLINIC | Age: 52
End: 2017-12-21

## 2017-12-21 NOTE — TELEPHONE ENCOUNTER
Good Afternoon.    The prior authorization for Mrs. Rasheed's prescription for Xarelto 20mg has been approved for six months, through 6/21/2018. The patient has been notified and is aware. If there are any additional questions please contact the pharmacy at, [phone].    Thank You.    Carole Sanchez CpT.  Patient Care Advocate   [facility]   [email or web address]

## 2017-12-22 DIAGNOSIS — E11.9 TYPE 2 DIABETES MELLITUS WITHOUT OPHTHALMIC MANIFESTATIONS: ICD-10-CM

## 2017-12-22 RX ORDER — GABAPENTIN 600 MG/1
1200 TABLET ORAL 3 TIMES DAILY
Qty: 180 TABLET | Refills: 3 | Status: SHIPPED | OUTPATIENT
Start: 2017-12-22 | End: 2018-04-16 | Stop reason: SDUPTHER

## 2018-01-04 DIAGNOSIS — I10 ESSENTIAL HYPERTENSION: ICD-10-CM

## 2018-01-04 NOTE — TELEPHONE ENCOUNTER
I called to reschedule patient appointment and she said that she was out of her clonidine. Can you please refill her medication ASAP thanks Ana

## 2018-01-05 RX ORDER — CLONIDINE HYDROCHLORIDE 0.2 MG/1
0.6 TABLET ORAL 2 TIMES DAILY
Qty: 180 TABLET | Refills: 5 | Status: SHIPPED | OUTPATIENT
Start: 2018-01-05 | End: 2018-06-29 | Stop reason: SDUPTHER

## 2018-01-09 DIAGNOSIS — I10 ESSENTIAL HYPERTENSION: ICD-10-CM

## 2018-01-09 NOTE — TELEPHONE ENCOUNTER
----- Message from Anna Toledo sent at 1/3/2018  1:46 PM CST -----  PT NEED REFILL ON cloNIDine (CATAPRES) 0.2 MG tablet SHE IS OUT

## 2018-01-16 ENCOUNTER — OFFICE VISIT (OUTPATIENT)
Dept: FAMILY MEDICINE | Facility: HOSPITAL | Age: 53
End: 2018-01-16
Attending: FAMILY MEDICINE
Payer: MEDICAID

## 2018-01-16 ENCOUNTER — LAB VISIT (OUTPATIENT)
Dept: LAB | Facility: HOSPITAL | Age: 53
End: 2018-01-16
Attending: FAMILY MEDICINE
Payer: MEDICAID

## 2018-01-16 VITALS
DIASTOLIC BLOOD PRESSURE: 76 MMHG | SYSTOLIC BLOOD PRESSURE: 119 MMHG | WEIGHT: 145.5 LBS | BODY MASS INDEX: 26.78 KG/M2 | HEART RATE: 80 BPM | HEIGHT: 62 IN

## 2018-01-16 DIAGNOSIS — E11.9 TYPE 2 DIABETES MELLITUS WITHOUT COMPLICATION, WITHOUT LONG-TERM CURRENT USE OF INSULIN: ICD-10-CM

## 2018-01-16 DIAGNOSIS — E78.5 HYPERLIPIDEMIA, UNSPECIFIED HYPERLIPIDEMIA TYPE: ICD-10-CM

## 2018-01-16 DIAGNOSIS — E11.9 TYPE 2 DIABETES MELLITUS WITHOUT OPHTHALMIC MANIFESTATIONS: ICD-10-CM

## 2018-01-16 DIAGNOSIS — E11.9 TYPE 2 DIABETES MELLITUS WITHOUT OPHTHALMIC MANIFESTATIONS: Primary | ICD-10-CM

## 2018-01-16 DIAGNOSIS — D50.9 IRON DEFICIENCY ANEMIA, UNSPECIFIED IRON DEFICIENCY ANEMIA TYPE: ICD-10-CM

## 2018-01-16 DIAGNOSIS — I10 ESSENTIAL HYPERTENSION: ICD-10-CM

## 2018-01-16 DIAGNOSIS — G43.009 MIGRAINE WITHOUT AURA AND WITHOUT STATUS MIGRAINOSUS, NOT INTRACTABLE: ICD-10-CM

## 2018-01-16 DIAGNOSIS — M32.9 SYSTEMIC LUPUS ERYTHEMATOSUS, UNSPECIFIED SLE TYPE, UNSPECIFIED ORGAN INVOLVEMENT STATUS: ICD-10-CM

## 2018-01-16 DIAGNOSIS — Z72.0 TOBACCO USE: ICD-10-CM

## 2018-01-16 LAB
CREAT UR-MCNC: 168 MG/DL
MICROALBUMIN UR DL<=1MG/L-MCNC: 16 UG/ML
MICROALBUMIN/CREATININE RATIO: 9.5 UG/MG

## 2018-01-16 PROCEDURE — 82043 UR ALBUMIN QUANTITATIVE: CPT

## 2018-01-16 PROCEDURE — 99214 OFFICE O/P EST MOD 30 MIN: CPT | Mod: 25 | Performed by: FAMILY MEDICINE

## 2018-01-16 PROCEDURE — 90686 IIV4 VACC NO PRSV 0.5 ML IM: CPT

## 2018-01-16 PROCEDURE — 90472 IMMUNIZATION ADMIN EACH ADD: CPT

## 2018-01-16 RX ORDER — VARENICLINE TARTRATE 0.5 (11)-1
1 KIT ORAL 2 TIMES DAILY
Qty: 1 PACKAGE | Refills: 0 | Status: SHIPPED | OUTPATIENT
Start: 2018-01-16 | End: 2018-12-20

## 2018-01-16 RX ORDER — CLONIDINE HYDROCHLORIDE 0.2 MG/1
0.6 TABLET ORAL 2 TIMES DAILY
Qty: 180 TABLET | Refills: 5 | OUTPATIENT
Start: 2018-01-16

## 2018-01-16 RX ORDER — PROPRANOLOL HYDROCHLORIDE 40 MG/1
40 TABLET ORAL 3 TIMES DAILY
Qty: 90 TABLET | Refills: 5 | Status: SHIPPED | OUTPATIENT
Start: 2018-01-16 | End: 2018-07-09 | Stop reason: SDUPTHER

## 2018-01-16 RX ORDER — BUTALBITAL, ACETAMINOPHEN AND CAFFEINE 50; 325; 40 MG/1; MG/1; MG/1
1 TABLET ORAL DAILY PRN
Qty: 15 TABLET | Refills: 0 | Status: SHIPPED | OUTPATIENT
Start: 2018-01-16 | End: 2018-07-09 | Stop reason: SDUPTHER

## 2018-01-16 RX ORDER — METFORMIN HYDROCHLORIDE 1000 MG/1
1000 TABLET ORAL 2 TIMES DAILY
Qty: 90 TABLET | Refills: 3 | Status: SHIPPED | OUTPATIENT
Start: 2018-01-16 | End: 2018-03-23 | Stop reason: SDUPTHER

## 2018-01-16 RX ORDER — SIMVASTATIN 40 MG/1
40 TABLET, FILM COATED ORAL DAILY
Qty: 90 TABLET | Refills: 1 | Status: SHIPPED | OUTPATIENT
Start: 2018-01-16 | End: 2018-07-09 | Stop reason: SDUPTHER

## 2018-01-16 RX ORDER — LOSARTAN POTASSIUM 25 MG/1
25 TABLET ORAL DAILY
Qty: 90 TABLET | Refills: 1 | Status: SHIPPED | OUTPATIENT
Start: 2018-01-16 | End: 2018-07-09 | Stop reason: SDUPTHER

## 2018-01-16 RX ORDER — GLIPIZIDE 5 MG/1
5 TABLET ORAL DAILY
Qty: 90 TABLET | Refills: 1 | Status: SHIPPED | OUTPATIENT
Start: 2018-01-16 | End: 2018-08-31 | Stop reason: SDUPTHER

## 2018-01-16 NOTE — PROGRESS NOTES
Subjective:       Patient ID: Aggie Rasheed is a 52 y.o. female.    Chief Complaint: Follow-up and Medication Refill    Ms. Rasheed is a 52 year old female with PMH, SLE, DMII, Protein C def, iron def anemia and chronic migraine who presents for follow-up. Patient reports compliance with meds. Reports occasional hair falling out, knee pains and fatigue. Does not think the plaquenil is helping. Continues to take Xarelto, no bleeding issues. Reports her BS's have been well controlled, has been eating a healthy diet. Denies polyuria, polydipsia. Checks feet every day, requesting referral to podiatry. Denies blood in stool or dark tarry stool. Rheum appt in feb. Continues to smoke 1ppd but would like to quit. Not currently taking iron pills.       Review of Systems   Constitutional: Negative for chills and fever.   HENT: Negative for ear pain, hearing loss and sore throat.    Eyes: Negative for pain and redness.   Respiratory: Negative for cough and shortness of breath.    Cardiovascular: Negative for chest pain and palpitations.   Gastrointestinal: Negative for abdominal pain, diarrhea, nausea and vomiting.   Genitourinary: Negative for difficulty urinating and dysuria.   Musculoskeletal: Positive for arthralgias and myalgias.   Skin: Negative for rash.   Neurological: Positive for headaches. Negative for syncope.   Psychiatric/Behavioral: Negative for agitation and confusion.       Objective:      Vitals:    01/16/18 0919   BP: 119/76   Pulse: 80     Physical Exam   Constitutional: She is oriented to person, place, and time. She appears well-developed and well-nourished. No distress.   HENT:   Head: Normocephalic and atraumatic.   Right Ear: External ear normal.   Left Ear: External ear normal.   Eyes: Conjunctivae are normal. Pupils are equal, round, and reactive to light. Right eye exhibits no discharge. Left eye exhibits no discharge.   Neck: Normal range of motion. Neck supple.   Cardiovascular: Normal rate and  regular rhythm.  Exam reveals no gallop and no friction rub.    No murmur heard.  Pulmonary/Chest: Effort normal and breath sounds normal. No respiratory distress. She has no wheezes. She has no rales.   Abdominal: Soft. Bowel sounds are normal. She exhibits no distension. There is no tenderness.   Musculoskeletal: Normal range of motion.   Stable amputation of L big toe, dry skin bilaterally around feet. Poor distal pulses. No ulcerations.      Neurological: She is alert and oriented to person, place, and time. She has normal reflexes.   Skin: Skin is warm. No rash noted. No erythema.   Psychiatric: She has a normal mood and affect. Her behavior is normal.       Assessment:       1. Type 2 diabetes mellitus without ophthalmic manifestations    2. Systemic lupus erythematosus, unspecified SLE type, unspecified organ involvement status    3. Migraine without aura and without status migrainosus, not intractable    4. Essential hypertension    5. Iron deficiency anemia, unspecified iron deficiency anemia type    6. Type 2 diabetes mellitus without complication, without long-term current use of insulin    7. Hyperlipidemia, unspecified hyperlipidemia type    8. Tobacco use        Plan:       Type 2 diabetes mellitus without ophthalmic manifestations  -     Microalbumin/creatinine urine ratio; Future; Expected date: 01/16/2018  -     Hemoglobin A1c; Future; Expected date: 01/16/2018  -     Ambulatory Referral to Podiatry  -     (In Office Administered) Pneumococcal Conjugate Vaccine (13 Valent) (IM)  -     Influenza - Quadrivalent (3 years & older) (PF)    Systemic lupus erythematosus, unspecified SLE type, unspecified organ involvement status  -     (In Office Administered) Pneumococcal Conjugate Vaccine (13 Valent) (IM)  -     Influenza - Quadrivalent (3 years & older) (PF)    Migraine without aura and without status migrainosus, not intractable  -     butalbital-acetaminophen-caffeine -40 mg (FIORICET, ESGIC)  -40 mg per tablet; Take 1 tablet by mouth daily as needed.  Dispense: 15 tablet; Refill: 0  -     propranolol (INDERAL) 40 MG tablet; Take 1 tablet (40 mg total) by mouth 3 (three) times daily.  Dispense: 90 tablet; Refill: 5    Essential hypertension  -     losartan (COZAAR) 25 MG tablet; Take 1 tablet (25 mg total) by mouth once daily.  Dispense: 90 tablet; Refill: 1    Iron deficiency anemia, unspecified iron deficiency anemia type  -     CBC auto differential; Future; Expected date: 01/16/2018    Type 2 diabetes mellitus without complication, without long-term current use of insulin  -     glipiZIDE (GLUCOTROL) 5 MG tablet; Take 1 tablet (5 mg total) by mouth once daily.  Dispense: 90 tablet; Refill: 1  -     metFORMIN (GLUCOPHAGE) 1000 MG tablet; Take 1 tablet (1,000 mg total) by mouth 2 (two) times daily.  Dispense: 90 tablet; Refill: 3    Hyperlipidemia, unspecified hyperlipidemia type  -     simvastatin (ZOCOR) 40 MG tablet; Take 1 tablet (40 mg total) by mouth once daily.  Dispense: 90 tablet; Refill: 1    Tobacco use  -     Ambulatory referral to Smoking Cessation Program    Other orders  -     rivaroxaban (XARELTO) 10 mg Tab; Take 2 tablets (20 mg total) by mouth daily with dinner or evening meal.  Dispense: 90 tablet; Refill: 3  -     varenicline (CHANTIX ROSALIA) 0.5 mg (11)- 1 mg (42) tablet; Take 1 tablet by mouth 2 (two) times daily. Take one 0.5mg tab by mouth once daily X3 days,then increase to one 0.5mg tab twice daily X4 days,then increase to one 1mg tab twice daily  Dispense: 1 Package; Refill: 0    Patient will sign up for smoking cessation program today. Given hand RX for Varenicline for when patient is signed up. Referred to smoking cessation program. Recommend PPSV 23 at least 8 weeks after due to immunocompromising condition.     Follow-up in about 3 months (around 4/16/2018).

## 2018-01-16 NOTE — PROGRESS NOTES
I assume primary medical responsibility for this patient, I have reviewed the case history, findings, diagnosis and treatment plan with the resident and agree that the care is reasonable and necessary. This service has been performed by a resident without the presence of a teaching physician under the primary care exception  Olive Childs  1/16/2018

## 2018-01-16 NOTE — PROGRESS NOTES
Injection given. Bandage applied. Tolerated well. Patient instructed to wait in lobby for 15 min before leaving.Information on medication given. Verbalized understanding.

## 2018-01-23 DIAGNOSIS — E11.8 DIABETIC FOOT: Primary | ICD-10-CM

## 2018-01-23 DIAGNOSIS — Z91.89 AT RISK FOR DIABETIC FOOT ULCER: ICD-10-CM

## 2018-02-27 ENCOUNTER — LAB VISIT (OUTPATIENT)
Dept: LAB | Facility: HOSPITAL | Age: 53
End: 2018-02-27
Attending: INTERNAL MEDICINE
Payer: MEDICAID

## 2018-02-27 DIAGNOSIS — I82.409 ACUTE EMBOLISM AND THROMBOSIS OF UNSPECIFIED DEEP VEINS OF UNSPECIFIED LOWER EXTREMITY: Primary | ICD-10-CM

## 2018-02-27 LAB
ALBUMIN SERPL BCP-MCNC: 3.9 G/DL
ALP SERPL-CCNC: 87 U/L
ALT SERPL W/O P-5'-P-CCNC: 15 U/L
ANION GAP SERPL CALC-SCNC: 11 MMOL/L
AST SERPL-CCNC: 14 U/L
BASOPHILS # BLD AUTO: 0.02 K/UL
BASOPHILS NFR BLD: 0.4 %
BILIRUB SERPL-MCNC: 0.3 MG/DL
BUN SERPL-MCNC: 20 MG/DL
C3 SERPL-MCNC: 142 MG/DL
C4 SERPL-MCNC: 22 MG/DL
CALCIUM SERPL-MCNC: 9.7 MG/DL
CHLORIDE SERPL-SCNC: 108 MMOL/L
CO2 SERPL-SCNC: 22 MMOL/L
CREAT SERPL-MCNC: 1.1 MG/DL
CRP SERPL-MCNC: 4.2 MG/L
DIFFERENTIAL METHOD: ABNORMAL
EOSINOPHIL # BLD AUTO: 0.1 K/UL
EOSINOPHIL NFR BLD: 2.5 %
ERYTHROCYTE [DISTWIDTH] IN BLOOD BY AUTOMATED COUNT: 13.8 %
ERYTHROCYTE [SEDIMENTATION RATE] IN BLOOD BY WESTERGREN METHOD: 11 MM/HR
EST. GFR  (AFRICAN AMERICAN): >60 ML/MIN/1.73 M^2
EST. GFR  (NON AFRICAN AMERICAN): 57 ML/MIN/1.73 M^2
GLUCOSE SERPL-MCNC: 129 MG/DL
HCT VFR BLD AUTO: 40.9 %
HGB BLD-MCNC: 12.8 G/DL
LYMPHOCYTES # BLD AUTO: 1 K/UL
LYMPHOCYTES NFR BLD: 18.1 %
MCH RBC QN AUTO: 28 PG
MCHC RBC AUTO-ENTMCNC: 31.3 G/DL
MCV RBC AUTO: 90 FL
MONOCYTES # BLD AUTO: 0.3 K/UL
MONOCYTES NFR BLD: 5.1 %
NEUTROPHILS # BLD AUTO: 4.1 K/UL
NEUTROPHILS NFR BLD: 73.7 %
PLATELET # BLD AUTO: 111 K/UL
PMV BLD AUTO: 12.5 FL
POTASSIUM SERPL-SCNC: 5 MMOL/L
PROT SERPL-MCNC: 7 G/DL
RBC # BLD AUTO: 4.57 M/UL
SODIUM SERPL-SCNC: 141 MMOL/L
WBC # BLD AUTO: 5.54 K/UL

## 2018-02-27 PROCEDURE — 85652 RBC SED RATE AUTOMATED: CPT

## 2018-02-27 PROCEDURE — 86160 COMPLEMENT ANTIGEN: CPT

## 2018-02-27 PROCEDURE — 86225 DNA ANTIBODY NATIVE: CPT

## 2018-02-27 PROCEDURE — 80053 COMPREHEN METABOLIC PANEL: CPT

## 2018-02-27 PROCEDURE — 85025 COMPLETE CBC W/AUTO DIFF WBC: CPT

## 2018-02-27 PROCEDURE — 86160 COMPLEMENT ANTIGEN: CPT | Mod: 59

## 2018-02-27 PROCEDURE — 86140 C-REACTIVE PROTEIN: CPT

## 2018-02-27 PROCEDURE — 36415 COLL VENOUS BLD VENIPUNCTURE: CPT

## 2018-02-28 LAB — DSDNA AB SER-ACNC: NORMAL [IU]/ML

## 2018-03-23 DIAGNOSIS — E11.9 TYPE 2 DIABETES MELLITUS WITHOUT COMPLICATION, WITHOUT LONG-TERM CURRENT USE OF INSULIN: ICD-10-CM

## 2018-03-28 RX ORDER — METFORMIN HYDROCHLORIDE 1000 MG/1
1000 TABLET ORAL 2 TIMES DAILY
Qty: 90 TABLET | Refills: 3 | Status: SHIPPED | OUTPATIENT
Start: 2018-03-28 | End: 2018-07-09 | Stop reason: SDUPTHER

## 2018-04-16 DIAGNOSIS — E11.9 TYPE 2 DIABETES MELLITUS WITHOUT OPHTHALMIC MANIFESTATIONS: ICD-10-CM

## 2018-04-16 RX ORDER — GABAPENTIN 600 MG/1
1200 TABLET ORAL 3 TIMES DAILY
Qty: 180 TABLET | Refills: 3 | Status: SHIPPED | OUTPATIENT
Start: 2018-04-16 | End: 2018-07-09 | Stop reason: SDUPTHER

## 2018-04-16 NOTE — TELEPHONE ENCOUNTER
----- Message from Carlie Taveras sent at 4/16/2018 10:07 AM CDT -----   is calling because she was told that her pharmacy sent over a request last week and today to refill gabapentin 600mg...she is calling to follow up on the request was received and sent back to the pharmacy. Please call [phone].

## 2018-06-29 DIAGNOSIS — I10 ESSENTIAL HYPERTENSION: ICD-10-CM

## 2018-06-29 RX ORDER — CLONIDINE HYDROCHLORIDE 0.2 MG/1
0.6 TABLET ORAL 2 TIMES DAILY
Qty: 180 TABLET | Refills: 5 | Status: SHIPPED | OUTPATIENT
Start: 2018-06-29 | End: 2018-12-18 | Stop reason: SDUPTHER

## 2018-06-29 NOTE — TELEPHONE ENCOUNTER
----- Message from Liliane Coronado MA sent at 6/29/2018 10:52 AM CDT -----  Patient states she is out of her clonidine.  Please send to pharmacy.  Thanks.

## 2018-07-09 ENCOUNTER — OFFICE VISIT (OUTPATIENT)
Dept: FAMILY MEDICINE | Facility: HOSPITAL | Age: 53
End: 2018-07-09
Attending: FAMILY MEDICINE
Payer: MEDICAID

## 2018-07-09 VITALS
HEART RATE: 69 BPM | DIASTOLIC BLOOD PRESSURE: 81 MMHG | BODY MASS INDEX: 27.3 KG/M2 | HEIGHT: 62 IN | SYSTOLIC BLOOD PRESSURE: 168 MMHG | WEIGHT: 148.38 LBS

## 2018-07-09 DIAGNOSIS — M25.521 RIGHT ELBOW PAIN: Primary | ICD-10-CM

## 2018-07-09 DIAGNOSIS — G43.009 MIGRAINE WITHOUT AURA AND WITHOUT STATUS MIGRAINOSUS, NOT INTRACTABLE: ICD-10-CM

## 2018-07-09 DIAGNOSIS — Z72.0 TOBACCO USE: ICD-10-CM

## 2018-07-09 DIAGNOSIS — M32.9 SYSTEMIC LUPUS ERYTHEMATOSUS, UNSPECIFIED SLE TYPE, UNSPECIFIED ORGAN INVOLVEMENT STATUS: ICD-10-CM

## 2018-07-09 DIAGNOSIS — E78.5 HYPERLIPIDEMIA, UNSPECIFIED HYPERLIPIDEMIA TYPE: ICD-10-CM

## 2018-07-09 DIAGNOSIS — E11.9 TYPE 2 DIABETES MELLITUS WITHOUT COMPLICATION, WITHOUT LONG-TERM CURRENT USE OF INSULIN: ICD-10-CM

## 2018-07-09 DIAGNOSIS — I10 ESSENTIAL HYPERTENSION: ICD-10-CM

## 2018-07-09 DIAGNOSIS — E11.9 TYPE 2 DIABETES MELLITUS WITHOUT OPHTHALMIC MANIFESTATIONS: ICD-10-CM

## 2018-07-09 PROCEDURE — 99213 OFFICE O/P EST LOW 20 MIN: CPT | Mod: 25 | Performed by: FAMILY MEDICINE

## 2018-07-09 PROCEDURE — 90471 IMMUNIZATION ADMIN: CPT

## 2018-07-09 RX ORDER — SIMVASTATIN 40 MG/1
40 TABLET, FILM COATED ORAL DAILY
Qty: 90 TABLET | Refills: 1 | Status: SHIPPED | OUTPATIENT
Start: 2018-07-09 | End: 2018-10-02 | Stop reason: SDUPTHER

## 2018-07-09 RX ORDER — PROPRANOLOL HYDROCHLORIDE 40 MG/1
40 TABLET ORAL 3 TIMES DAILY
Qty: 90 TABLET | Refills: 5 | Status: SHIPPED | OUTPATIENT
Start: 2018-07-09 | End: 2019-01-30 | Stop reason: SDUPTHER

## 2018-07-09 RX ORDER — LOSARTAN POTASSIUM 25 MG/1
50 TABLET ORAL DAILY
Qty: 90 TABLET | Refills: 1 | Status: SHIPPED | OUTPATIENT
Start: 2018-07-09 | End: 2018-12-20 | Stop reason: SDUPTHER

## 2018-07-09 RX ORDER — BUTALBITAL, ACETAMINOPHEN AND CAFFEINE 50; 325; 40 MG/1; MG/1; MG/1
1 TABLET ORAL DAILY PRN
Qty: 15 TABLET | Refills: 0 | Status: SHIPPED | OUTPATIENT
Start: 2018-07-09 | End: 2018-10-02

## 2018-07-09 RX ORDER — LOSARTAN POTASSIUM 25 MG/1
25 TABLET ORAL DAILY
Qty: 90 TABLET | Refills: 1 | Status: SHIPPED | OUTPATIENT
Start: 2018-07-09 | End: 2018-07-09 | Stop reason: SDUPTHER

## 2018-07-09 RX ORDER — GABAPENTIN 600 MG/1
1200 TABLET ORAL 3 TIMES DAILY
Qty: 180 TABLET | Refills: 3 | Status: SHIPPED | OUTPATIENT
Start: 2018-07-09 | End: 2018-10-02 | Stop reason: SDUPTHER

## 2018-07-09 RX ORDER — METFORMIN HYDROCHLORIDE 1000 MG/1
1000 TABLET ORAL 2 TIMES DAILY
Qty: 90 TABLET | Refills: 3 | Status: SHIPPED | OUTPATIENT
Start: 2018-07-09 | End: 2018-10-02 | Stop reason: SDUPTHER

## 2018-07-09 NOTE — PROGRESS NOTES
Subjective:       Patient ID: Aggie Rasheed is a 52 y.o. female.    Chief Complaint: Elbow Pain and Diabetes (follow-up)    Ms. Rasheed is a 52 year old female with PMH, SLE, DMII, Protein C def, iron def anemia and chronic migraine who presents for elbow pain, SLE follow-up, DMII follow-up and tobacco use follow-up. Patient reports elbow pain that started a couple months ago. Didn't have trauma but states she was visiting her daughter and picking up her grandaughter a lot. States the pain seems to be in the joint, ttp over medial area per patient. Worse after doing things around the house. Denies numbness or tingling. Taking Tylenol about once a day, 1000mg, seems to help a bit. Also better with rest. Hasn't tried compression or ice/heating. Never had similar problems.   SLE- well-controlled, denies any issues. Hasn't seen the hematologist yet, wanted to switch to Warfarin. Still getting Xarelto but insurance doesn't want to pay for it. Switched originally by PCP previous. States she has a clotting d/o caused by the lupus. Rheum wants patient to see hematologist.  DMII-hasn't been checking her BS's. Has been taking 5mg glipizide and 1000mg BID metformin, hasn't missed doses. Not checking BS's. Denies polyuria/polyphagia/weakness/fatigue. Last A1C 6.1 in January.   HTN- has been taking her medications. States she took her medication this AM.   Tobacco use- 1ppd, never got the Chantix. States she has set a date for her birthday next month, interested in the [facility] cessation program.       Review of Systems   Constitutional: Negative for chills and fever.   HENT: Negative for congestion and sore throat.    Eyes: Negative for pain and redness.   Respiratory: Negative for cough and shortness of breath.    Cardiovascular: Negative for chest pain and palpitations.   Gastrointestinal: Negative for abdominal pain, diarrhea, nausea and vomiting.   Genitourinary: Negative for difficulty urinating and dysuria.    Musculoskeletal: Positive for arthralgias. Negative for myalgias.   Skin: Negative for rash.   Neurological: Positive for headaches. Negative for syncope.   Psychiatric/Behavioral: Negative for agitation and confusion.       Objective:      Vitals:    07/09/18 0853   BP: (!) 168/81   Pulse: 69     Physical Exam   Constitutional: She is oriented to person, place, and time. She appears well-developed and well-nourished. No distress.   HENT:   Head: Normocephalic and atraumatic.   Right Ear: External ear normal.   Left Ear: External ear normal.   Eyes: Conjunctivae are normal. Pupils are equal, round, and reactive to light. Right eye exhibits no discharge. Left eye exhibits no discharge.   Neck: Normal range of motion. No thyromegaly present.   Cardiovascular: Normal rate and regular rhythm.  Exam reveals no gallop and no friction rub.    No murmur heard.  Pulmonary/Chest: Effort normal and breath sounds normal. No respiratory distress. She has no wheezes. She has no rales.   Abdominal: Soft. Bowel sounds are normal. She exhibits no distension. There is no tenderness.   Musculoskeletal: Normal range of motion.   Neurological: She is alert and oriented to person, place, and time. She has normal reflexes.   Skin: Skin is warm. No rash noted. No erythema.   Psychiatric: She has a normal mood and affect. Her behavior is normal.       Assessment:       1. Right elbow pain    2. Type 2 diabetes mellitus without ophthalmic manifestations    3. Systemic lupus erythematosus, unspecified SLE type, unspecified organ involvement status    4. Essential hypertension    5. Type 2 diabetes mellitus without complication, without long-term current use of insulin    6. Migraine without aura and without status migrainosus, not intractable    7. Hyperlipidemia, unspecified hyperlipidemia type    8. Tobacco use        Plan:       Right elbow pain    Type 2 diabetes mellitus without ophthalmic manifestations  -     gabapentin (NEURONTIN)  600 MG tablet; Take 2 tablets (1,200 mg total) by mouth 3 (three) times daily.  Dispense: 180 tablet; Refill: 3  -     HEMOGLOBIN A1C; Future; Expected date: 07/09/2018  -     (In Office Administered) Pneumococcal Polysaccharide Vaccine (23 Valent) (SQ/IM)    Systemic lupus erythematosus, unspecified SLE type, unspecified organ involvement status  -     (In Office Administered) Pneumococcal Polysaccharide Vaccine (23 Valent) (SQ/IM)    Essential hypertension  -     losartan (COZAAR) 25 MG tablet; Take  tablet (25 mg total) by mouth once daily.  Dispense: 90 tablet; Refill: 1    Type 2 diabetes mellitus without complication, without long-term current use of insulin  -     metFORMIN (GLUCOPHAGE) 1000 MG tablet; Take 1 tablet (1,000 mg total) by mouth 2 (two) times daily.  Dispense: 90 tablet; Refill: 3    Migraine without aura and without status migrainosus, not intractable  -     propranolol (INDERAL) 40 MG tablet; Take 1 tablet (40 mg total) by mouth 3 (three) times daily.  Dispense: 90 tablet; Refill: 5  -     butalbital-acetaminophen-caffeine -40 mg (FIORICET, ESGIC) -40 mg per tablet; Take 1 tablet by mouth daily as needed.  Dispense: 15 tablet; Refill: 0    Hyperlipidemia, unspecified hyperlipidemia type  -     simvastatin (ZOCOR) 40 MG tablet; Take 1 tablet (40 mg total) by mouth once daily.  Dispense: 90 tablet; Refill: 1    Tobacco use  -     Ambulatory referral to Smoking Cessation Program    Increased Cozaar to 50mg daily for high BP. Consider increasing again at next visit. Patient will need to see hematology per rheum recs, likely switch from Xarelto back to Warfarin.     Follow-up in about 1 month (around 8/9/2018) for Hematology appointment, smoking cessation, elbow and knee pain.

## 2018-07-10 ENCOUNTER — LAB VISIT (OUTPATIENT)
Dept: LAB | Facility: HOSPITAL | Age: 53
End: 2018-07-10
Attending: FAMILY MEDICINE
Payer: MEDICAID

## 2018-07-10 DIAGNOSIS — E11.9 TYPE 2 DIABETES MELLITUS WITHOUT OPHTHALMIC MANIFESTATIONS: ICD-10-CM

## 2018-07-10 LAB
ESTIMATED AVG GLUCOSE: 134 MG/DL
HBA1C MFR BLD HPLC: 6.3 %

## 2018-07-10 PROCEDURE — 83036 HEMOGLOBIN GLYCOSYLATED A1C: CPT

## 2018-07-10 PROCEDURE — 36415 COLL VENOUS BLD VENIPUNCTURE: CPT

## 2018-07-18 ENCOUNTER — HOSPITAL ENCOUNTER (EMERGENCY)
Facility: HOSPITAL | Age: 53
Discharge: HOME | End: 2018-07-18
Payer: MEDICAID

## 2018-07-18 VITALS
RESPIRATION RATE: 20 BRPM | DIASTOLIC BLOOD PRESSURE: 88 MMHG | SYSTOLIC BLOOD PRESSURE: 201 MMHG | HEIGHT: 62 IN | TEMPERATURE: 98 F | WEIGHT: 147 LBS | BODY MASS INDEX: 27.05 KG/M2 | HEART RATE: 92 BPM | OXYGEN SATURATION: 99 %
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
## 2018-07-18 DIAGNOSIS — S60.221A CONTUSION OF RIGHT HAND, INITIAL ENCOUNTER: Primary | ICD-10-CM

## 2018-07-18 PROCEDURE — 99283 EMERGENCY DEPT VISIT LOW MDM: CPT | Mod: 25

## 2018-07-18 PROCEDURE — 25000003 PHARM REV CODE 250

## 2018-07-18 RX ORDER — ACETAMINOPHEN 325 MG/1
650 TABLET ORAL
Status: COMPLETED | OUTPATIENT
Start: 2018-07-18 | End: 2018-07-18

## 2018-07-18 RX ORDER — IBUPROFEN 600 MG/1
600 TABLET ORAL
Status: DISCONTINUED | OUTPATIENT
Start: 2018-07-18 | End: 2018-07-18

## 2018-07-18 RX ADMIN — ACETAMINOPHEN 650 MG: 325 TABLET, FILM COATED ORAL at 10:07

## 2018-07-19 NOTE — ED NOTES
Pt to ER with c/o hand pain following a fall she experienced at 3pm this afternoon. Abrasions to right cheek and chin noted. Pt denies losing consciousness. Abrasion to right knee noted.

## 2018-07-19 NOTE — ED PROVIDER NOTES
Encounter Date: 2018    SCRIBE #1 NOTE: I, Gen Staley, am scribing for, and in the presence of,  Dr. Burgess. I have scribed the entire note.       History     Chief Complaint   Patient presents with    Hand Injury     52y F ambulatory to ED with c/o right hand pain after tripping and falling earlier today     Aggie Rasheed is a 52 y.o. female who  has a past medical history of Diabetes mellitus type II; Headache associated with hormonal factors; Hyperlipidemia; Hypertension; Lupus; Protein C deficiency; and Tachycardia.    Patient presents to the ED due to R hand pain after mechanical fall from one low step earlier today. Pt rolled her ankle, scraped R cheek, chin, R knee and broke fall with R hand. Pt has prior fracture on the R hand, hardware installed and subsequently removed after infection. Attempting to make a fist worsens pain.         The history is provided by the patient.     Review of patient's allergies indicates:   Allergen Reactions    Niacin preparations Other (See Comments)     Red inflammed eyes    Codeine Rash     Past Medical History:   Diagnosis Date    Diabetes mellitus type II     Headache associated with hormonal factors     Hyperlipidemia     Hypertension     Lupus     Protein C deficiency     Tachycardia      Past Surgical History:   Procedure Laterality Date    AORTIC VALVE SURGERY       SECTION      COLONOSCOPY N/A 2017    Procedure: COLONOSCOPY;  Surgeon: Markos Calvert MD;  Location: [facility];  Service: Endoscopy;  Laterality: N/A;    TOE AMPUTATION      TONSILLECTOMY      WRIST FRACTURE SURGERY       Family History   Problem Relation Age of Onset    Hypertension Mother     Cancer Father     Diabetes Maternal Uncle     Stroke Maternal Grandfather     Colon cancer Paternal Aunt      Social History   Substance Use Topics    Smoking status: Current Every Day Smoker     Packs/day: 1.00     Types: Cigarettes    Smokeless tobacco: Never Used     Alcohol use No     Review of Systems   Musculoskeletal:        Hand pain   Skin:        abrasions       Physical Exam     Initial Vitals   BP Pulse Resp Temp SpO2   07/18/18 2235 07/18/18 2219 07/18/18 2219 07/18/18 2219 07/18/18 2219   (!) 218/98 98 18 98.9 °F (37.2 °C) 100 %      MAP       --                Physical Exam    Nursing note and vitals reviewed.  Constitutional: She appears well-developed and well-nourished.   HENT:   Head: Normocephalic.   Eyes: EOM are normal.   Neck: Normal range of motion. Neck supple.   Pulmonary/Chest: No respiratory distress.   Abdominal: Soft.   Musculoskeletal: Normal range of motion.   R hand tenderness at middle finger MCP joint.   [place] foot/knee/ankle exam intact  Distal neurovascularly intact      Neurological: She is alert and oriented to person, place, and time.   Skin: Skin is warm and dry.   multiple superficial abrasions   Psychiatric: She has a normal mood and affect.         ED Course   Procedures  Labs Reviewed - No data to display       Imaging Results          X-Ray Hand 3 view Right (Final result)  Result time 07/18/18 23:16:02    Final result by Mendez Noble MD (07/18/18 23:16:02)                 Impression:      As above.      Electronically signed by: Mendez Noble MD  Date:    07/18/2018  Time:    23:16             Narrative:    EXAMINATION:  XR HAND COMPLETE 3 VIEW RIGHT    CLINICAL HISTORY:  pain/injury;    TECHNIQUE:  PA, lateral, and oblique views of the right hand were performed.    COMPARISON:  October 2017.    FINDINGS:  Remote incompletely healed fracture is seen at the distal tuft of the middle finger distal phalanx.  No evidence of new acute fracture or dislocation.  Prominent degenerative changes are seen at the radiocarpal joint.                                 Medical Decision Making:   ED Management:  Patient is nontoxic appearing in the ED.  No persistent emergent issues detected.  Exam benign.  We will discharge home to follow up  with primary care as needed.  Patient will return to the ED as needed for any deterioration or any other concerns.  Verbal discharge instructions and return precautions given.                       Clinical Impression:   The primary encounter diagnosis was Contusion of right hand, initial encounter. A diagnosis of Fall, initial encounter was also pertinent to this visit.         I, Itz Burgess, personally performed the services described in this documentation. All medical record entries made by the scribe were at my direction and in my presence.  I have reviewed the chart and agree that the record reflects my personal performance and is accurate and complete within the limitations of emergency medical charting. Itz Burgess M.D.                    Itz Burgess MD  07/18/18 9782       Itz Burgess MD  10/17/18 0256

## 2018-07-19 NOTE — ED NOTES
Pt states her left ankle is hurting her and would like for Dr. Burgess to take a look. Pt has full ROM of left ankle but experiences pain when turning ankle inward.

## 2018-07-20 NOTE — PROGRESS NOTES
Case discussed with resident at time of visit.  I have reviewed and concur with the resident's evaluation, assessment, and plan.  Last A1C c/w well controlled DM.  Continue metformin.

## 2018-08-31 DIAGNOSIS — E11.9 TYPE 2 DIABETES MELLITUS WITHOUT COMPLICATION, WITHOUT LONG-TERM CURRENT USE OF INSULIN: ICD-10-CM

## 2018-08-31 NOTE — TELEPHONE ENCOUNTER
----- Message from Helena Avendano sent at 8/30/2018  2:12 PM CDT -----  Patient needs a refill for glipiZIDE (GLUCOTROL) 5 MG tablet. She called last week and no refill yet

## 2018-09-04 RX ORDER — GLIPIZIDE 5 MG/1
5 TABLET ORAL DAILY
Qty: 90 TABLET | Refills: 1 | Status: SHIPPED | OUTPATIENT
Start: 2018-09-04 | End: 2018-10-02 | Stop reason: SDUPTHER

## 2018-10-02 ENCOUNTER — OFFICE VISIT (OUTPATIENT)
Dept: FAMILY MEDICINE | Facility: HOSPITAL | Age: 53
End: 2018-10-02
Attending: FAMILY MEDICINE
Payer: MEDICAID

## 2018-10-02 VITALS
HEIGHT: 62 IN | SYSTOLIC BLOOD PRESSURE: 140 MMHG | DIASTOLIC BLOOD PRESSURE: 80 MMHG | BODY MASS INDEX: 26.65 KG/M2 | WEIGHT: 144.81 LBS | HEART RATE: 75 BPM

## 2018-10-02 DIAGNOSIS — E11.9 TYPE 2 DIABETES MELLITUS WITHOUT OPHTHALMIC MANIFESTATIONS: Primary | ICD-10-CM

## 2018-10-02 DIAGNOSIS — E78.5 HYPERLIPIDEMIA, UNSPECIFIED HYPERLIPIDEMIA TYPE: ICD-10-CM

## 2018-10-02 DIAGNOSIS — D68.59 PROTEIN C DEFICIENCY: ICD-10-CM

## 2018-10-02 DIAGNOSIS — Z23 NEED FOR PROPHYLACTIC VACCINATION AND INOCULATION AGAINST INFLUENZA: ICD-10-CM

## 2018-10-02 DIAGNOSIS — M32.9 SYSTEMIC LUPUS ERYTHEMATOSUS, UNSPECIFIED SLE TYPE, UNSPECIFIED ORGAN INVOLVEMENT STATUS: ICD-10-CM

## 2018-10-02 DIAGNOSIS — Z12.31 SCREENING MAMMOGRAM, ENCOUNTER FOR: ICD-10-CM

## 2018-10-02 DIAGNOSIS — G43.009 MIGRAINE WITHOUT AURA AND WITHOUT STATUS MIGRAINOSUS, NOT INTRACTABLE: ICD-10-CM

## 2018-10-02 DIAGNOSIS — E11.9 TYPE 2 DIABETES MELLITUS WITHOUT COMPLICATION, WITHOUT LONG-TERM CURRENT USE OF INSULIN: ICD-10-CM

## 2018-10-02 DIAGNOSIS — I10 ESSENTIAL HYPERTENSION: ICD-10-CM

## 2018-10-02 PROCEDURE — 99214 OFFICE O/P EST MOD 30 MIN: CPT | Performed by: FAMILY MEDICINE

## 2018-10-02 PROCEDURE — 90686 IIV4 VACC NO PRSV 0.5 ML IM: CPT

## 2018-10-02 RX ORDER — GABAPENTIN 600 MG/1
1200 TABLET ORAL 3 TIMES DAILY
Qty: 180 TABLET | Refills: 3 | Status: SHIPPED | OUTPATIENT
Start: 2018-10-02 | End: 2019-02-11 | Stop reason: SDUPTHER

## 2018-10-02 RX ORDER — SUMATRIPTAN 50 MG/1
50 TABLET, FILM COATED ORAL
Qty: 30 TABLET | Refills: 0 | Status: SHIPPED | OUTPATIENT
Start: 2018-10-02 | End: 2019-02-25 | Stop reason: SDUPTHER

## 2018-10-02 RX ORDER — METFORMIN HYDROCHLORIDE 1000 MG/1
1000 TABLET ORAL 2 TIMES DAILY
Qty: 90 TABLET | Refills: 3 | Status: SHIPPED | OUTPATIENT
Start: 2018-10-02 | End: 2018-11-26 | Stop reason: SDUPTHER

## 2018-10-02 RX ORDER — SIMVASTATIN 40 MG/1
40 TABLET, FILM COATED ORAL DAILY
Qty: 90 TABLET | Refills: 1 | Status: SHIPPED | OUTPATIENT
Start: 2018-10-02 | End: 2019-02-25 | Stop reason: SDUPTHER

## 2018-10-02 RX ORDER — GLIPIZIDE 5 MG/1
5 TABLET ORAL DAILY
Qty: 90 TABLET | Refills: 1 | Status: SHIPPED | OUTPATIENT
Start: 2018-10-02 | End: 2018-12-20

## 2018-10-02 NOTE — PROGRESS NOTES
Subjective:       Patient ID: Aggie Rasheed is a 53 y.o. female.    Chief Complaint: Migraine (follow-up) and Diabetes (follow-up)    Ms. Rasheed is a 53 year old female PMH, SLE, DMII, Protein C def, iron def anemia and chronic migraine who presents for DMII follow-up and leg pain.       DMII-takes glipizide 5mg BID, metformin. Hasn't been checking her BS's. Has been taking her medications.     Leg Pain- bilateral leg pain, worse with movement and walking, better with rest. Tylenol doesn't help. 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 Lost her L big toe at that time (2007). Dr. Holland did the surgery.     Migraine headaches- has tried preventative medications in the past, tries tylenol first. States she will take a fiorecet atleast twice a week. Has tried gabapentin for ppx in the past.     Sees the rheumatologist and hematologist on 10/30. Will re-evaluate her Xarelto.      Continues to smoke 1ppd, states she is working her way up to quitting but not ready at this time. Doesn't drink.       Review of Systems   Constitutional: Negative for chills and fever.   HENT: Negative for congestion and sore throat.    Eyes: Negative for pain and redness.   Respiratory: Negative for cough and shortness of breath.    Cardiovascular: Negative for chest pain and palpitations.   Gastrointestinal: Negative for diarrhea, nausea and vomiting.   Genitourinary: Negative for difficulty urinating and dysuria.   Musculoskeletal: Negative for arthralgias and myalgias.   Skin: Negative for rash.   Neurological: Negative for syncope and headaches.   Psychiatric/Behavioral: Negative for agitation and confusion.       Objective:      Vitals:    10/02/18 1439   BP: (!) 140/80   Pulse: 75     Physical Exam   Constitutional: She is oriented to person, place, and time. She appears well-developed and well-nourished. No distress.   HENT:   Head: Normocephalic and atraumatic.   Right Ear: External  ear normal.   Left Ear: External ear normal.   Eyes: Conjunctivae are normal. Pupils are equal, round, and reactive to light. Right eye exhibits no discharge. Left eye exhibits no discharge.   Neck: Normal range of motion. No JVD present. No thyromegaly present.   Cardiovascular: Normal rate and regular rhythm. Exam reveals no gallop and no friction rub.   No murmur heard.  Pulmonary/Chest: Effort normal and breath sounds normal. No respiratory distress. She has no wheezes. She has no rales.   Abdominal: Soft. Bowel sounds are normal. She exhibits no distension. There is no tenderness.   Musculoskeletal: Normal range of motion.   Neurological: She is alert and oriented to person, place, and time. She has normal reflexes.   Skin: Skin is warm. No rash noted. No erythema.   Psychiatric: She has a normal mood and affect. Her behavior is normal.       Assessment:       1. Type 2 diabetes mellitus without ophthalmic manifestations    2. Systemic lupus erythematosus, unspecified SLE type, unspecified organ involvement status    3. Essential hypertension    4. Protein C deficiency    5. Migraine without aura and without status migrainosus, not intractable    6. Need for prophylactic vaccination and inoculation against influenza    7. Type 2 diabetes mellitus without complication, without long-term current use of insulin    8. Hyperlipidemia, unspecified hyperlipidemia type    9. Screening mammogram, encounter for        Plan:       Type 2 diabetes mellitus without ophthalmic manifestations  -     gabapentin (NEURONTIN) 600 MG tablet; Take 2 tablets (1,200 mg total) by mouth 3 (three) times daily.  Dispense: 180 tablet; Refill: 3    Systemic lupus erythematosus, unspecified SLE type, unspecified organ involvement status    Essential hypertension    Protein C deficiency    Migraine without aura and without status migrainosus, not intractable  -     sumatriptan (IMITREX) 50 MG tablet; Take 1 tablet (50 mg total) by mouth  every 2 (two) hours as needed (Max dose of 200 mg per day).  Dispense: 30 tablet; Refill: 0    Need for prophylactic vaccination and inoculation against influenza  -     Flu Vaccine - Quadrivalent (PF) (3 years & older)    Type 2 diabetes mellitus without complication, without long-term current use of insulin  -     metFORMIN (GLUCOPHAGE) 1000 MG tablet; Take 1 tablet (1,000 mg total) by mouth 2 (two) times daily.  Dispense: 90 tablet; Refill: 3  -     glipiZIDE (GLUCOTROL) 5 MG tablet; Take 1 tablet (5 mg total) by mouth once daily.  Dispense: 90 tablet; Refill: 1    Hyperlipidemia, unspecified hyperlipidemia type  -     simvastatin (ZOCOR) 40 MG tablet; Take 1 tablet (40 mg total) by mouth once daily.  Dispense: 90 tablet; Refill: 1    Screening mammogram, encounter for  -     Mammo Digital Screening Bilateral With CAD; Future; Expected date: 10/02/2018      Follow-up in about 1 month (around 11/2/2018).

## 2018-10-02 NOTE — PROGRESS NOTES
I have reviewed the notes, assessments, and/or procedures performed by Dr. Guerra, I concur with her/his documentation of Aggie Rasheed.

## 2018-10-16 ENCOUNTER — HOSPITAL ENCOUNTER (OUTPATIENT)
Dept: RADIOLOGY | Facility: HOSPITAL | Age: 53
Discharge: HOME | End: 2018-10-16
Attending: FAMILY MEDICINE
Payer: MEDICAID

## 2018-10-16 DIAGNOSIS — Z12.31 SCREENING MAMMOGRAM, ENCOUNTER FOR: ICD-10-CM

## 2018-10-16 PROCEDURE — 77063 BREAST TOMOSYNTHESIS BI: CPT | Mod: TC

## 2018-10-16 PROCEDURE — 77067 SCR MAMMO BI INCL CAD: CPT | Mod: 26,,, | Performed by: RADIOLOGY

## 2018-10-16 PROCEDURE — 77063 BREAST TOMOSYNTHESIS BI: CPT | Mod: 26,,, | Performed by: RADIOLOGY

## 2018-10-16 PROCEDURE — 77067 SCR MAMMO BI INCL CAD: CPT | Mod: TC

## 2018-11-26 DIAGNOSIS — E11.9 TYPE 2 DIABETES MELLITUS WITHOUT COMPLICATION, WITHOUT LONG-TERM CURRENT USE OF INSULIN: ICD-10-CM

## 2018-11-26 RX ORDER — METFORMIN HYDROCHLORIDE 1000 MG/1
1000 TABLET ORAL 2 TIMES DAILY
Qty: 90 TABLET | Refills: 3 | Status: SHIPPED | OUTPATIENT
Start: 2018-11-26 | End: 2019-02-25 | Stop reason: SDUPTHER

## 2018-12-14 DIAGNOSIS — I10 ESSENTIAL HYPERTENSION: ICD-10-CM

## 2018-12-14 RX ORDER — CLONIDINE HYDROCHLORIDE 0.2 MG/1
0.6 TABLET ORAL 2 TIMES DAILY
Qty: 180 TABLET | Refills: 5 | Status: CANCELLED | OUTPATIENT
Start: 2018-12-14

## 2018-12-14 NOTE — TELEPHONE ENCOUNTER
----- Message from Liliane Coronado MA sent at 12/14/2018  8:59 AM CST -----  Patient called from number above requesting refill on clonidine; said pharmacy has requested several times.  Thanks.

## 2018-12-17 DIAGNOSIS — I10 ESSENTIAL HYPERTENSION: ICD-10-CM

## 2018-12-17 RX ORDER — CLONIDINE HYDROCHLORIDE 0.2 MG/1
0.6 TABLET ORAL 2 TIMES DAILY
Qty: 180 TABLET | Refills: 5 | Status: CANCELLED | OUTPATIENT
Start: 2018-12-17

## 2018-12-17 NOTE — TELEPHONE ENCOUNTER
----- Message from Anna Toledo sent at 12/17/2018 10:06 AM CST -----  PT HAS CALL NEED REFILL ON HER cloNIDine (CATAPRES) 0.2 MG tablet SHE HAS TAKEN LAST PILL TODAY

## 2018-12-18 DIAGNOSIS — I10 ESSENTIAL HYPERTENSION: ICD-10-CM

## 2018-12-18 RX ORDER — CLONIDINE HYDROCHLORIDE 0.2 MG/1
0.6 TABLET ORAL 2 TIMES DAILY
Qty: 180 TABLET | Refills: 1 | Status: SHIPPED | OUTPATIENT
Start: 2018-12-18 | End: 2018-12-20

## 2018-12-20 ENCOUNTER — OFFICE VISIT (OUTPATIENT)
Dept: FAMILY MEDICINE | Facility: HOSPITAL | Age: 53
End: 2018-12-20
Attending: FAMILY MEDICINE
Payer: MEDICAID

## 2018-12-20 VITALS
HEIGHT: 62 IN | WEIGHT: 145.06 LBS | HEART RATE: 77 BPM | SYSTOLIC BLOOD PRESSURE: 166 MMHG | BODY MASS INDEX: 26.69 KG/M2 | DIASTOLIC BLOOD PRESSURE: 76 MMHG

## 2018-12-20 DIAGNOSIS — J20.9 ACUTE BRONCHITIS, UNSPECIFIED ORGANISM: Primary | ICD-10-CM

## 2018-12-20 DIAGNOSIS — D68.59 PROTEIN C DEFICIENCY: ICD-10-CM

## 2018-12-20 DIAGNOSIS — I10 ESSENTIAL HYPERTENSION: ICD-10-CM

## 2018-12-20 DIAGNOSIS — M32.9 SYSTEMIC LUPUS ERYTHEMATOSUS, UNSPECIFIED SLE TYPE, UNSPECIFIED ORGAN INVOLVEMENT STATUS: ICD-10-CM

## 2018-12-20 DIAGNOSIS — E11.9 TYPE 2 DIABETES MELLITUS WITHOUT OPHTHALMIC MANIFESTATIONS: ICD-10-CM

## 2018-12-20 PROCEDURE — 99213 OFFICE O/P EST LOW 20 MIN: CPT | Performed by: FAMILY MEDICINE

## 2018-12-20 RX ORDER — LOSARTAN POTASSIUM 25 MG/1
50 TABLET ORAL DAILY
Qty: 90 TABLET | Refills: 1 | Status: SHIPPED | OUTPATIENT
Start: 2018-12-20 | End: 2019-03-08

## 2018-12-20 RX ORDER — CLONIDINE HYDROCHLORIDE 0.2 MG/1
0.4 TABLET ORAL 3 TIMES DAILY
Qty: 90 TABLET | Refills: 1
Start: 2018-12-20 | End: 2019-02-25 | Stop reason: SDUPTHER

## 2018-12-20 RX ORDER — BENZONATATE 200 MG/1
200 CAPSULE ORAL NIGHTLY PRN
Qty: 30 CAPSULE | Refills: 0 | Status: SHIPPED | OUTPATIENT
Start: 2018-12-20 | End: 2018-12-30

## 2018-12-20 NOTE — PROGRESS NOTES
Subjective:       Patient ID: Aggie Rasheed is a 53 y.o. female.    Chief Complaint: Follow-up and URI    Ms. Rasheed is a 53 year old female PMH SLE, HTN, DMII, Protein C def, iron def anemia and chronic migraine who presents for HTN follow-up and URI symptoms.    HTN: hasn't been checking it at home. Has been taking Clonidine twice a day. States it feels like her BP is going up around 2-3pm. States she has tried other medications and it didn't go well.     SLE: believes she had a flare a couple weeks ago. States her knees and elbows hurt. Was supposed to see rheumatologist but missed the appointment 2/2 illness. Hematologist called and said they wouldn't be able to help. She now has an appointment in May with [facility]. Goes back to rheum in February. 2000, dvt; 2007, had another DVT that turned into an abdominal clot. Patient failed coumadin therapy 2/2 eratic numbers and follow-up and was switched to Xarelto.     Patient reports URI symptoms that started on Monday. States she had general malaise, didn't feel feverish. Had a sore throat the first few days, can't stop coughing. Mucinex DM has helped a little bit. Woke up last night coughing. Feeling better today.    Daughter had a confirmed flu and grandkids had colds. Had a flu shot this year a couple months ago.       Review of Systems   Constitutional: Negative for chills and fatigue.   HENT: Positive for congestion and sore throat.        Objective:      Vitals:    12/20/18 0922   BP: (!) 166/76   Pulse: 77     Physical Exam   Constitutional: She is oriented to person, place, and time. She appears well-developed and well-nourished. No distress.   HENT:   Head: Normocephalic and atraumatic.   Right Ear: External ear normal.   Left Ear: External ear normal.   Eyes: Conjunctivae are normal. Pupils are equal, round, and reactive to light. Right eye exhibits no discharge. Left eye exhibits no discharge.   Neck: Normal range of motion. No JVD present. No thyromegaly  present.   Cardiovascular: Normal rate and regular rhythm. Exam reveals no gallop and no friction rub.   No murmur heard.  Pulmonary/Chest: Effort normal and breath sounds normal. No respiratory distress. She has no wheezes. She has no rales.   +rhonchi   Abdominal: Soft. Bowel sounds are normal. She exhibits no distension. There is no tenderness.   Musculoskeletal: Normal range of motion.   Neurological: She is alert and oriented to person, place, and time. She has normal reflexes.   Skin: Skin is warm. No rash noted. No erythema.   Psychiatric: She has a normal mood and affect. Her behavior is normal.       Assessment:       1. Acute bronchitis, unspecified organism    2. Essential hypertension    3. Type 2 diabetes mellitus without ophthalmic manifestations    4. Protein C deficiency    5. Systemic lupus erythematosus, unspecified SLE type, unspecified organ involvement status        Plan:       Acute bronchitis, unspecified organism  -     benzonatate (TESSALON) 200 MG capsule; Take 1 capsule (200 mg total) by mouth nightly as needed for Cough.  Dispense: 30 capsule; Refill: 0    Essential hypertension  -     cloNIDine (CATAPRES) 0.2 MG tablet; Take 2 tablets (0.4 mg total) by mouth 3 (three) times daily.  Dispense: 90 tablet; Refill: 1  -     losartan (COZAAR) 25 MG tablet; Take 2 tablets (50 mg total) by mouth once daily.  Dispense: 90 tablet; Refill: 1    Type 2 diabetes mellitus without ophthalmic manifestations    Protein C deficiency    Systemic lupus erythematosus, unspecified SLE type, unspecified organ involvement status    SLE and Protein C stable, c/w rheum and heme/onc follow-up. A1C 6.2 in July, will stop glipizide and repeat A1C in February. Will change clonidine to 0.4mg three times a day. Counseled patient on tessalon pearls to help with sleep, will send in z-pack if symptoms continue >10 days.     Follow-up in about 2 months (around 2/20/2019) for Repeat A1C, consider restarting glimepride PRN.  .

## 2019-01-10 NOTE — PROGRESS NOTES
I was present in clinic during the visit and assume the primary medical care of this patient.  I discussed the case with Dr. Guerra, and I have reviewed and agree with the assessment and plan.

## 2019-01-24 DIAGNOSIS — G43.009 MIGRAINE WITHOUT AURA AND WITHOUT STATUS MIGRAINOSUS, NOT INTRACTABLE: ICD-10-CM

## 2019-01-24 RX ORDER — PROPRANOLOL HYDROCHLORIDE 40 MG/1
40 TABLET ORAL 3 TIMES DAILY
Qty: 90 TABLET | Refills: 5 | Status: CANCELLED | OUTPATIENT
Start: 2019-01-24

## 2019-01-30 DIAGNOSIS — G43.009 MIGRAINE WITHOUT AURA AND WITHOUT STATUS MIGRAINOSUS, NOT INTRACTABLE: ICD-10-CM

## 2019-01-30 NOTE — TELEPHONE ENCOUNTER
----- Message from Helena Avendano sent at 1/30/2019  3:45 PM CST -----  Pt needs a refill for propranolol (INDERAL) 40 MG tablet ASAP.She is completely out and needs this for to keep her heart rate steady

## 2019-01-31 NOTE — TELEPHONE ENCOUNTER
----- Message from Anna Toledo sent at 1/31/2019  2:18 PM CST -----  PT CALL NEED REFILL ON propranolol (INDERAL) 40 MG tablet SHE IS OUT OF HER MEDINICE SHE HAS BEEN CALLING AND CALLING

## 2019-02-01 RX ORDER — PROPRANOLOL HYDROCHLORIDE 40 MG/1
40 TABLET ORAL 3 TIMES DAILY
Qty: 90 TABLET | Refills: 5 | Status: SHIPPED | OUTPATIENT
Start: 2019-02-01 | End: 2019-08-06 | Stop reason: SDUPTHER

## 2019-02-08 ENCOUNTER — TELEPHONE (OUTPATIENT)
Dept: FAMILY MEDICINE | Facility: HOSPITAL | Age: 54
End: 2019-02-08

## 2019-02-08 NOTE — TELEPHONE ENCOUNTER
----- Message from Liliane Coronado MA sent at 2/8/2019 10:43 AM CST -----  Contact: [facility]; [place]    Pharmacy requesting refill on xarelto; please call pharmacy.  Patient is out. Thanks.

## 2019-02-11 DIAGNOSIS — E11.9 TYPE 2 DIABETES MELLITUS WITHOUT OPHTHALMIC MANIFESTATIONS: ICD-10-CM

## 2019-02-11 DIAGNOSIS — I10 ESSENTIAL HYPERTENSION: ICD-10-CM

## 2019-02-11 RX ORDER — CLONIDINE HYDROCHLORIDE 0.2 MG/1
TABLET ORAL
Qty: 180 TABLET | Refills: 1 | Status: SHIPPED | OUTPATIENT
Start: 2019-02-11 | End: 2019-02-25

## 2019-02-11 RX ORDER — GABAPENTIN 600 MG/1
1200 TABLET ORAL 3 TIMES DAILY
Qty: 180 TABLET | Refills: 3 | Status: SHIPPED | OUTPATIENT
Start: 2019-02-11 | End: 2019-05-31 | Stop reason: SDUPTHER

## 2019-02-25 ENCOUNTER — OFFICE VISIT (OUTPATIENT)
Dept: FAMILY MEDICINE | Facility: HOSPITAL | Age: 54
End: 2019-02-25
Attending: SPECIALIST
Payer: MEDICAID

## 2019-02-25 VITALS
HEART RATE: 90 BPM | SYSTOLIC BLOOD PRESSURE: 150 MMHG | BODY MASS INDEX: 25.6 KG/M2 | HEIGHT: 62 IN | DIASTOLIC BLOOD PRESSURE: 85 MMHG | WEIGHT: 139.13 LBS

## 2019-02-25 DIAGNOSIS — D68.59 PROTEIN C DEFICIENCY: ICD-10-CM

## 2019-02-25 DIAGNOSIS — I10 ESSENTIAL HYPERTENSION: ICD-10-CM

## 2019-02-25 DIAGNOSIS — E04.2 MULTIPLE THYROID NODULES: ICD-10-CM

## 2019-02-25 DIAGNOSIS — Z72.0 TOBACCO USE: ICD-10-CM

## 2019-02-25 DIAGNOSIS — D50.0 IRON DEFICIENCY ANEMIA DUE TO CHRONIC BLOOD LOSS: ICD-10-CM

## 2019-02-25 DIAGNOSIS — M32.9 SYSTEMIC LUPUS ERYTHEMATOSUS, UNSPECIFIED SLE TYPE, UNSPECIFIED ORGAN INVOLVEMENT STATUS: ICD-10-CM

## 2019-02-25 DIAGNOSIS — E11.9 TYPE 2 DIABETES MELLITUS WITHOUT OPHTHALMIC MANIFESTATIONS: Primary | ICD-10-CM

## 2019-02-25 DIAGNOSIS — G43.009 MIGRAINE WITHOUT AURA AND WITHOUT STATUS MIGRAINOSUS, NOT INTRACTABLE: ICD-10-CM

## 2019-02-25 PROCEDURE — 99213 OFFICE O/P EST LOW 20 MIN: CPT | Performed by: FAMILY MEDICINE

## 2019-02-25 RX ORDER — CLONIDINE HYDROCHLORIDE 0.2 MG/1
0.2 TABLET ORAL 3 TIMES DAILY
Qty: 90 TABLET | Refills: 5 | Status: SHIPPED | OUTPATIENT
Start: 2019-02-25 | End: 2019-04-23

## 2019-02-25 RX ORDER — LOSARTAN POTASSIUM 25 MG/1
75 TABLET ORAL DAILY
Qty: 90 TABLET | Refills: 5 | Status: SHIPPED | OUTPATIENT
Start: 2019-02-25 | End: 2019-05-31

## 2019-02-25 RX ORDER — METFORMIN HYDROCHLORIDE 1000 MG/1
1000 TABLET ORAL 2 TIMES DAILY
Qty: 90 TABLET | Refills: 3 | Status: SHIPPED | OUTPATIENT
Start: 2019-02-25 | End: 2019-10-09 | Stop reason: SDUPTHER

## 2019-02-25 RX ORDER — VARENICLINE TARTRATE 0.5 (11)-1
KIT ORAL
Qty: 1 PACKAGE | Refills: 0 | Status: SHIPPED | OUTPATIENT
Start: 2019-02-25 | End: 2019-05-31

## 2019-02-25 RX ORDER — SUMATRIPTAN 50 MG/1
50 TABLET, FILM COATED ORAL
Qty: 30 TABLET | Refills: 3 | Status: SHIPPED | OUTPATIENT
Start: 2019-02-25 | End: 2019-10-09

## 2019-02-25 RX ORDER — SIMVASTATIN 40 MG/1
40 TABLET, FILM COATED ORAL DAILY
Qty: 90 TABLET | Refills: 1 | Status: SHIPPED | OUTPATIENT
Start: 2019-02-25 | End: 2019-05-31 | Stop reason: SDUPTHER

## 2019-02-25 NOTE — PROGRESS NOTES
Subjective:       Patient ID: Aggie Rasheed is a 53 y.o. female.    Chief Complaint: Diabetes (follow-up)    Ms. Rasheed is a 52 year old female with PMH, SLE, DMII, Protein C def, iron def anemia and chronic migraine who presents for DMII and HTN f/u.    DMII: Patient taking metformin at home. Not taking her BS's at home. Not feeling symptomatic.     Essential HTN: Patient on clonidine 0.2mg TID, Cozaar 50mg daily for BP management. Patient not taking her BP at home. Denies symptoms.     Continues to have claudication pain in both legs, continues to smoke.     Having some tooth pain, cracked tooth. Taking tylenol, helps.     Smoking history: interested in calling the [facility] smoking cessation program but not ready to quit today.       Review of Systems   Constitutional: Negative for chills and fever.   HENT: Negative for congestion and sore throat.        Objective:      Vitals:    02/25/19 0859   BP: (!) 150/85   Pulse: 90     Physical Exam   Constitutional: She is oriented to person, place, and time. She appears well-developed and well-nourished. No distress.   BMI Readings from Last 2 Encounters:  02/25/19 : 25.44 kg/m²  02/18/19 : 26.52 kg/m²       HENT:   Head: Normocephalic and atraumatic.   Right Ear: External ear normal.   Left Ear: External ear normal.   Eyes: Conjunctivae are normal. Pupils are equal, round, and reactive to light. Right eye exhibits no discharge. Left eye exhibits no discharge.   Neck: Normal range of motion. No JVD present.   Cardiovascular: Normal rate and regular rhythm. Exam reveals no gallop and no friction rub.   No murmur heard.  Pulmonary/Chest: Effort normal and breath sounds normal. No respiratory distress. She has no wheezes. She has no rales.   Musculoskeletal: Normal range of motion.   Neurological: She is alert and oriented to person, place, and time. She has normal reflexes.   Skin: Skin is warm. No rash noted. No erythema.   Psychiatric: She has a normal mood and  affect. Her behavior is normal.       Assessment:       1. Type 2 diabetes mellitus without ophthalmic manifestations    2. Iron deficiency anemia due to chronic blood loss    3. Essential hypertension    4. Systemic lupus erythematosus, unspecified SLE type, unspecified organ involvement status    5. Protein C deficiency    6. Multiple thyroid nodules    7. Migraine without aura and without status migrainosus, not intractable    8. Tobacco use        Plan:       Type 2 diabetes mellitus without ophthalmic manifestations  -     Hemoglobin A1c; Future; Expected date: 02/25/2019  -     Microalbumin/creatinine urine ratio; Future; Expected date: 02/25/2019  -     metFORMIN (GLUCOPHAGE) 1000 MG tablet; Take 1 tablet (1,000 mg total) by mouth 2 (two) times daily.  Dispense: 90 tablet; Refill: 3  -     simvastatin (ZOCOR) 40 MG tablet; Take 1 tablet (40 mg total) by mouth once daily.  Dispense: 90 tablet; Refill: 1    Iron deficiency anemia due to chronic blood loss  -     CBC auto differential; Future; Expected date: 02/25/2019    Essential hypertension  -     Comprehensive metabolic panel; Future; Expected date: 02/25/2019  -     losartan (COZAAR) 25 MG tablet; Take 3 tablets (75 mg total) by mouth once daily.  Dispense: 90 tablet; Refill: 5  -     cloNIDine (CATAPRES) 0.2 MG tablet; Take 1 tablet (0.2 mg total) by mouth 3 (three) times daily.  Dispense: 90 tablet; Refill: 5    Systemic lupus erythematosus, unspecified SLE type, unspecified organ involvement status  -     CBC auto differential; Future; Expected date: 02/25/2019  -     Cancel: Sedimentation rate; Future; Expected date: 02/25/2019  -     Cancel: C-reactive protein; Future; Expected date: 02/25/2019  -     Cancel: C3 complement; Future; Expected date: 02/25/2019  -     Cancel: C4 complement; Future; Expected date: 02/25/2019    Protein C deficiency  -     CBC auto differential; Future; Expected date: 02/25/2019    Multiple thyroid nodules  -     TSH;  Future; Expected date: 02/25/2019    Migraine without aura and without status migrainosus, not intractable  -     sumatriptan (IMITREX) 50 MG tablet; Take 1 tablet (50 mg total) by mouth every 2 (two) hours as needed (Max dose of 200 mg per day).  Dispense: 30 tablet; Refill: 3    Tobacco use  -     varenicline (CHANTIX STARTING MONTH BOX) 0.5 mg (11)- 1 mg (42) tablet; Take one 0.5mg tab by mouth once daily X3 days,then increase to one 0.5mg tab twice daily X4 days,then increase to one 1mg tab twice daily  Dispense: 1 Package; Refill: 0    Will increase Cozaar to 75mg, recommend patient bring in BP log at next visit with BP goal <130/80 given history of SLE, HTN and DM. F/U labs. Continue to recommend smoking cessation to patient. Continue to follow with rheum and hematology for SLE and protein C def. All questions answered.      Follow-up in about 6 months (around 8/25/2019).

## 2019-02-26 ENCOUNTER — HOSPITAL ENCOUNTER (OUTPATIENT)
Dept: RADIOLOGY | Facility: HOSPITAL | Age: 54
Discharge: HOME | End: 2019-02-26
Attending: SURGERY
Payer: MEDICAID

## 2019-02-26 DIAGNOSIS — D50.9 IRON DEFICIENCY ANEMIA, UNSPECIFIED IRON DEFICIENCY ANEMIA TYPE: ICD-10-CM

## 2019-02-26 DIAGNOSIS — E04.2 MULTIPLE THYROID NODULES: ICD-10-CM

## 2019-02-26 DIAGNOSIS — E78.5 HYPERLIPIDEMIA, UNSPECIFIED HYPERLIPIDEMIA TYPE: ICD-10-CM

## 2019-02-26 DIAGNOSIS — E11.9 TYPE 2 DIABETES MELLITUS WITHOUT OPHTHALMIC MANIFESTATIONS: ICD-10-CM

## 2019-02-26 DIAGNOSIS — I10 ESSENTIAL HYPERTENSION: ICD-10-CM

## 2019-02-26 PROCEDURE — 76536 US EXAM OF HEAD AND NECK: CPT | Mod: TC

## 2019-02-26 PROCEDURE — 76536 US EXAM OF HEAD AND NECK: CPT | Mod: 26,,, | Performed by: RADIOLOGY

## 2019-02-26 PROCEDURE — 76536 US SOFT TISSUE HEAD NECK THYROID: ICD-10-PCS | Mod: 26,,, | Performed by: RADIOLOGY

## 2019-03-01 ENCOUNTER — DOCUMENTATION ONLY (OUTPATIENT)
Dept: FAMILY MEDICINE | Facility: HOSPITAL | Age: 54
End: 2019-03-01

## 2019-03-01 ENCOUNTER — TELEPHONE (OUTPATIENT)
Dept: FAMILY MEDICINE | Facility: HOSPITAL | Age: 54
End: 2019-03-01

## 2019-03-01 NOTE — PROGRESS NOTES
Faxed back from for dental extraction. Patient needs clearance from her hematologist for Xarelto changes prior to dental surgery as patient has lupus with protein C def and history of severe clot. Faxed form back to dental group with this information.     3/1/2019 8:52 AM Eric uGerra M.D.

## 2019-03-07 ENCOUNTER — TELEPHONE (OUTPATIENT)
Dept: FAMILY MEDICINE | Facility: HOSPITAL | Age: 54
End: 2019-03-07

## 2019-03-07 NOTE — TELEPHONE ENCOUNTER
----- Message from Liliane Coronado MA sent at 3/6/2019  4:03 PM CST -----  Patient states you increased losartin 25mg to 3xdaily but insurance will not pay.  Can you increase to 50mg and give her 45 per month?  Please call patient to advise. Also needs to  Discuss tooth extraction. Thanks.

## 2019-03-08 DIAGNOSIS — I10 ESSENTIAL HYPERTENSION: Primary | ICD-10-CM

## 2019-03-08 RX ORDER — LOSARTAN POTASSIUM 50 MG/1
75 TABLET ORAL DAILY
Qty: 45 TABLET | Refills: 5 | Status: SHIPPED | OUTPATIENT
Start: 2019-03-08 | End: 2019-10-09

## 2019-03-21 ENCOUNTER — OFFICE VISIT (OUTPATIENT)
Dept: HEMATOLOGY/ONCOLOGY | Facility: CLINIC | Age: 54
End: 2019-03-21
Payer: MEDICAID

## 2019-03-21 ENCOUNTER — LAB VISIT (OUTPATIENT)
Dept: LAB | Facility: HOSPITAL | Age: 54
End: 2019-03-21
Attending: STUDENT IN AN ORGANIZED HEALTH CARE EDUCATION/TRAINING PROGRAM
Payer: MEDICAID

## 2019-03-21 VITALS
WEIGHT: 139.13 LBS | HEIGHT: 62 IN | HEART RATE: 85 BPM | OXYGEN SATURATION: 98 % | BODY MASS INDEX: 25.6 KG/M2 | DIASTOLIC BLOOD PRESSURE: 77 MMHG | RESPIRATION RATE: 20 BRPM | SYSTOLIC BLOOD PRESSURE: 204 MMHG

## 2019-03-21 DIAGNOSIS — Z86.718 HISTORY OF THROMBOSIS: ICD-10-CM

## 2019-03-21 DIAGNOSIS — Z79.01 CURRENT USE OF LONG TERM ANTICOAGULATION: ICD-10-CM

## 2019-03-21 DIAGNOSIS — I10 ESSENTIAL HYPERTENSION: ICD-10-CM

## 2019-03-21 DIAGNOSIS — E78.5 HYPERLIPIDEMIA, UNSPECIFIED HYPERLIPIDEMIA TYPE: ICD-10-CM

## 2019-03-21 DIAGNOSIS — D68.59 PROTEIN C DEFICIENCY: ICD-10-CM

## 2019-03-21 DIAGNOSIS — Z86.718 HISTORY OF THROMBOSIS: Primary | ICD-10-CM

## 2019-03-21 DIAGNOSIS — D50.0 IRON DEFICIENCY ANEMIA DUE TO CHRONIC BLOOD LOSS: ICD-10-CM

## 2019-03-21 PROCEDURE — 99205 OFFICE O/P NEW HI 60 MIN: CPT | Mod: S$PBB,,, | Performed by: STUDENT IN AN ORGANIZED HEALTH CARE EDUCATION/TRAINING PROGRAM

## 2019-03-21 PROCEDURE — 86146 BETA-2 GLYCOPROTEIN ANTIBODY: CPT | Mod: 59

## 2019-03-21 PROCEDURE — 85303 CLOT INHIBIT PROT C ACTIVITY: CPT

## 2019-03-21 PROCEDURE — 85305 CLOT INHIBIT PROT S TOTAL: CPT

## 2019-03-21 PROCEDURE — 99999 PR PBB SHADOW E&M-EST. PATIENT-LVL III: CPT | Mod: PBBFAC,,, | Performed by: STUDENT IN AN ORGANIZED HEALTH CARE EDUCATION/TRAINING PROGRAM

## 2019-03-21 PROCEDURE — 99213 OFFICE O/P EST LOW 20 MIN: CPT | Mod: PBBFAC | Performed by: STUDENT IN AN ORGANIZED HEALTH CARE EDUCATION/TRAINING PROGRAM

## 2019-03-21 PROCEDURE — 99999 PR PBB SHADOW E&M-EST. PATIENT-LVL III: ICD-10-PCS | Mod: PBBFAC,,, | Performed by: STUDENT IN AN ORGANIZED HEALTH CARE EDUCATION/TRAINING PROGRAM

## 2019-03-21 PROCEDURE — 36415 COLL VENOUS BLD VENIPUNCTURE: CPT

## 2019-03-21 PROCEDURE — 99205 PR OFFICE/OUTPT VISIT, NEW, LEVL V, 60-74 MIN: ICD-10-PCS | Mod: S$PBB,,, | Performed by: STUDENT IN AN ORGANIZED HEALTH CARE EDUCATION/TRAINING PROGRAM

## 2019-03-21 PROCEDURE — 86147 CARDIOLIPIN ANTIBODY EA IG: CPT

## 2019-03-21 NOTE — PROGRESS NOTES
"PATIENT: Aggie Rasheed  MRN: [id number]  DATE: 3/21/2019      Diagnosis:   1. History of thrombosis    2. Protein C deficiency        Chief Complaint: new consult    Subjective:   Ms. 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 Referred for "coag work-up for dental work". Per PCP note: "Patient needs clearance from her hematologist for Xarelto changes prior to dental surgery as patient has lupus with protein C def and history of severe clot."    Hematologic History  -2000: unprovoked DVT (does not recall what anti-coag or duration at that time); 2007, had another unprovoked DVT that turned into an abdominal clot, for which she required surgery per patient. States she has an IVC filter. Patient failed coumadin therapy 2/2 eratic numbers and follow-up and was switched to Xarelto.   -Used to follow with Hematology in [place], not seen in years  -Protein C diagnosed in 2008, diagnosed here. Patient states she was also diagnosed with APLS. Per patient's rheumatologist, apparently was considered switching back to Coumadin because of APLS.    Patient seen today, has elevated BP (>200), has been having issues with BP, no headache, vision changes, chest pain. Planned to have one tooth extracted. Patient has an uncle with reported factor V leiden and protein S deficiency. No other family history of VTE. Father had lung cancer. Patient smokes 1ppd. No history of miscarriages or premature births. No history of MI or CVA, but had a "head bleed" in 1996, no deficits.    Past Medical History:   Past Medical History:   Diagnosis Date    Diabetes mellitus type II     Headache associated with hormonal factors     Hyperlipidemia     Hypertension     " Lupus     Protein C deficiency     Tachycardia        Past Surgical HIstory:   Past Surgical History:   Procedure Laterality Date    AORTIC VALVE SURGERY      ASPIRATION-THYROID N/A 2017    [facility]    CAPSULE ENDOSCOPY N/A 2017    Performed by [facility] ENDO     SECTION      COLONOSCOPY N/A 2017    [facility]    ESOPHAGOGASTRODUODENOSCOPY (EGD) N/A 2017    [facility] ENDO    TOE AMPUTATION      TONSILLECTOMY      WRIST FRACTURE SURGERY         Family History:   Family History   Problem Relation Age of Onset    Hypertension Mother     Cancer Father     Diabetes Maternal Uncle     Stroke Maternal Grandfather     Colon cancer Paternal Aunt        Social History:  reports that she has been smoking cigarettes.  She has been smoking about 1.00 pack per day. she has never used smokeless tobacco. She reports that she does not drink alcohol or use drugs.    Allergies:  Review of patient's allergies indicates:   Allergen Reactions    Niacin preparations Other (See Comments)     Red inflammed eyes    Codeine Rash and Other (See Comments)       Medications:  Current Outpatient Medications   Medication Sig Dispense Refill    cloNIDine (CATAPRES) 0.2 MG tablet Take 1 tablet (0.2 mg total) by mouth 3 (three) times daily. 90 tablet 5    gabapentin (NEURONTIN) 600 MG tablet Take 2 tablets (1,200 mg total) by mouth 3 (three) times daily. 180 tablet 3    hydroxychloroquine (PLAQUENIL) 200 mg tablet TK 1 T PO BID  3    losartan (COZAAR) 25 MG tablet Take 3 tablets (75 mg total) by mouth once daily. 90 tablet 5    losartan (COZAAR) 50 MG tablet Take 1.5 tablets (75 mg total) by mouth once daily. 45 tablet 5    metFORMIN (GLUCOPHAGE) 1000 MG tablet Take 1 tablet (1,000 mg total) by mouth 2 (two) times daily. 90 tablet 3    omeprazole 20 mg TbEC omeprazole 20 mg  "capsule,delayed release   Take 1 capsule every day by oral route.      propranolol (INDERAL) 40 MG tablet Take 1 tablet (40 mg total) by mouth 3 (three) times daily. 90 tablet 5    rivaroxaban (XARELTO) 20 mg Tab Take 1 tablet (20 mg total) by mouth daily with dinner or evening meal. 90 tablet 3    simvastatin (ZOCOR) 40 MG tablet Take 1 tablet (40 mg total) by mouth once daily. 90 tablet 1    sumatriptan (IMITREX) 50 MG tablet Take 1 tablet (50 mg total) by mouth every 2 (two) hours as needed (Max dose of 200 mg per day). 30 tablet 3    varenicline (CHANTIX STARTING MONTH BOX) 0.5 mg (11)- 1 mg (42) tablet Take one 0.5mg tab by mouth once daily X3 days,then increase to one 0.5mg tab twice daily X4 days,then increase to one 1mg tab twice daily 1 Package 0     No current facility-administered medications for this visit.        Review of Systems   Constitutional: Negative for chills, fatigue, fever and unexpected weight change.   HENT: Positive for postnasal drip and rhinorrhea. Negative for mouth sores and nosebleeds.    Respiratory: Negative for cough and shortness of breath.    Cardiovascular: Negative for chest pain and leg swelling.   Gastrointestinal: Negative for abdominal pain, diarrhea, nausea and vomiting.   Endocrine: Negative for cold intolerance and heat intolerance.   Genitourinary: Negative for dysuria and hematuria.   Musculoskeletal: Positive for arthralgias. Negative for back pain.   Skin: Negative for rash.   Allergic/Immunologic: Negative for environmental allergies.   Neurological: Positive for headaches. Negative for light-headedness and numbness.   Hematological: Negative for adenopathy. Does not bruise/bleed easily.       ECOG Performance Status: 0   Objective:      Vitals:   Vitals:    03/21/19 1421   BP: (!) 204/77   BP Location: Left arm   Patient Position: Sitting   BP Method: Medium (Automatic)   Pulse: 85   Resp: 20   SpO2: 98%   Weight: 63.1 kg (139 lb 1.8 oz)   Height: 5' 2" (1.575 " m)     BMI: Body mass index is 25.44 kg/m².    Physical Exam   Constitutional: She is oriented to person, place, and time. She appears well-developed and well-nourished.   Eyes: EOM are normal.   Neck: Normal range of motion.   Cardiovascular: Normal rate and regular rhythm.   Pulmonary/Chest: Effort normal. No respiratory distress.   Abdominal: Soft. She exhibits no distension. There is no tenderness.   Musculoskeletal: She exhibits no edema.   Neurological: She is alert and oriented to person, place, and time.   Skin: Skin is warm and dry.   Psychiatric: She has a normal mood and affect. Her behavior is normal.       Laboratory Data:  No visits with results within 1 Week(s) from this visit.   Latest known visit with results is:   Lab Visit on 02/26/2019   Component Date Value Ref Range Status    Hemoglobin A1C 02/26/2019 6.7* 4.0 - 5.6 % Final    Comment: ADA Screening Guidelines:  5.7-6.4%  Consistent with prediabetes  >or=6.5%  Consistent with diabetes  High levels of fetal hemoglobin interfere with the HbA1C  assay. Heterozygous hemoglobin variants (HbS, HgC, etc)do  not significantly interfere with this assay.   However, presence of multiple variants may affect accuracy.      Estimated Avg Glucose 02/26/2019 146* 68 - 131 mg/dL Final    WBC 02/26/2019 6.28  3.90 - 12.70 K/uL Final    RBC 02/26/2019 4.85  4.00 - 5.40 M/uL Final    Hemoglobin 02/26/2019 13.4  12.0 - 16.0 g/dL Final    Hematocrit 02/26/2019 42.7  37.0 - 48.5 % Final    MCV 02/26/2019 88  82 - 98 fL Final    MCH 02/26/2019 27.6  27.0 - 31.0 pg Final    MCHC 02/26/2019 31.4* 32.0 - 36.0 g/dL Final    RDW 02/26/2019 14.6* 11.5 - 14.5 % Final    Platelets 02/26/2019 172  150 - 350 K/uL Final    MPV 02/26/2019 11.6  9.2 - 12.9 fL Final    Gran # (ANC) 02/26/2019 4.5  1.8 - 7.7 K/uL Final    Lymph # 02/26/2019 1.3  1.0 - 4.8 K/uL Final    Mono # 02/26/2019 0.4  0.3 - 1.0 K/uL Final    Eos # 02/26/2019 0.1  0.0 - 0.5 K/uL Final    Baso  # 02/26/2019 0.02  0.00 - 0.20 K/uL Final    Gran% 02/26/2019 71.8  38.0 - 73.0 % Final    Lymph% 02/26/2019 20.4  18.0 - 48.0 % Final    Mono% 02/26/2019 5.6  4.0 - 15.0 % Final    Eosinophil% 02/26/2019 1.9  0.0 - 8.0 % Final    Basophil% 02/26/2019 0.3  0.0 - 1.9 % Final    Differential Method 02/26/2019 Automated   Final    Sodium 02/26/2019 138  136 - 145 mmol/L Final    Potassium 02/26/2019 4.7  3.5 - 5.1 mmol/L Final    Chloride 02/26/2019 112* 95 - 110 mmol/L Final    CO2 02/26/2019 17* 23 - 29 mmol/L Final    Glucose 02/26/2019 135* 70 - 110 mg/dL Final    BUN, Bld 02/26/2019 24* 6 - 20 mg/dL Final    Creatinine 02/26/2019 1.3  0.5 - 1.4 mg/dL Final    Calcium 02/26/2019 9.6  8.7 - 10.5 mg/dL Final    Total Protein 02/26/2019 7.4  6.0 - 8.4 g/dL Final    Albumin 02/26/2019 4.1  3.5 - 5.2 g/dL Final    Total Bilirubin 02/26/2019 0.4  0.1 - 1.0 mg/dL Final    Comment: For infants and newborns, interpretation of results should be based  on gestational age, weight and in agreement with clinical  observations.  Premature Infant recommended reference ranges:  Up to 24 hours.............<8.0 mg/dL  Up to 48 hours............<12.0 mg/dL  3-5 days..................<15.0 mg/dL  6-29 days.................<15.0 mg/dL      Alkaline Phosphatase 02/26/2019 96  55 - 135 U/L Final    AST 02/26/2019 15  10 - 40 U/L Final    ALT 02/26/2019 11  10 - 44 U/L Final    Anion Gap 02/26/2019 9  8 - 16 mmol/L Final    eGFR if  02/26/2019 53* >60 mL/min/1.73 m^2 Final    eGFR if non African American 02/26/2019 46* >60 mL/min/1.73 m^2 Final    Comment: Calculation used to obtain the estimated glomerular filtration  rate (eGFR) is the CKD-EPI equation.       TSH 02/26/2019 1.165  0.400 - 4.000 uIU/mL Final        Assessment:       1. History of thrombosis    2. Protein C deficiency           Plan:   1) Previous VTE, possibly also arterial clot (per patient's history of abdominal aorta clot), has  IVC filter  -Reported history of protein C deficiency, and possible APLS  -Patient on Xarelto, previously on Coumadin but difficulty with erratic INRs  -Factor V leiden in 2008 was negative  -Repeat labs today: protein C, S, anticardiolipin, glycoprotein  -Will not check lupus anticoag, PT/PTT/fibrinogen as may not be accurate while on Xarelto  -If APLS labs abnormal, will repeat in 3 months and discuss switching to Coumadin  -If APLS labs normal, still would not be able to reliably check lupus anticoag, thus may need to empirically switch to Coumadin given reported history, and inferiority DOAC over Coumadin in this setting  -Patient's daughter has been checked for hyper-coag state, she will encourage her son to get checked  -Continue Xarelto. Based on patient's available history, she has a high thrombophilia risk, and is planning to undergo a low bleeding risk procedure, thus Xarelto should be held the night before the planned procedure, the day of, and post-op day 1. Fax information to Dr. Park at [phone]    2) Tobacco abuse  -Encouraged cessation    3) SLE  -On plaquenil    Follow-up: if APLS abnml, will recheck in 3 months    The following was staffed and discussed with supervising physician Dr. Jenkins.    Charlene Edouard MD  Hematology/Oncology fellow

## 2019-03-22 PROBLEM — Z79.01 CURRENT USE OF LONG TERM ANTICOAGULATION: Status: ACTIVE | Noted: 2019-03-22

## 2019-03-22 LAB — APTT PROTEIN C ACTIVATOR+FV DP/APTT PPP: 139 %

## 2019-03-25 LAB
CARDIOLIPIN IGG SER IA-ACNC: <9.4 GPL (ref 0–14.99)
CARDIOLIPIN IGM SER IA-ACNC: <9.4 MPL (ref 0–12.49)

## 2019-03-26 LAB
B2 GLYCOPROT1 IGA SER QL: <9 SAU
B2 GLYCOPROT1 IGG SER QL: <9 SGU
B2 GLYCOPROT1 IGM SER QL: <9 SMU

## 2019-03-29 LAB — PROT S ACT/NOR PPP: >130 % (ref 70–140)

## 2019-04-01 ENCOUNTER — TELEPHONE (OUTPATIENT)
Dept: HEMATOLOGY/ONCOLOGY | Facility: CLINIC | Age: 54
End: 2019-04-01

## 2019-04-01 NOTE — TELEPHONE ENCOUNTER
Attempted to call patient to discuss lab results, her mother answered, and states patient is out of house at the moment, and does not have another phone. Will have patient call back. Antiphospholipid labs were all normal, thus can follow up as needed in Hematology clinic. Unable to check lupus anti-coagulant while on Xarelto as can cause false positive. Thus could have discussion regarding switching from Xarelto to Coumadin in this setting, assuming reported history of APLS, and inability to check lupus anti-coagulant, inferiority of Xarelto if APLS.

## 2019-04-09 ENCOUNTER — TELEPHONE (OUTPATIENT)
Dept: FAMILY MEDICINE | Facility: HOSPITAL | Age: 54
End: 2019-04-09

## 2019-04-09 NOTE — TELEPHONE ENCOUNTER
----- Message from Liliane Coronado MA sent at 4/9/2019  8:59 AM CDT -----  Patient states the rx for cloNIDine (CATAPRES) 0.2 MG tablet was called in for one tab three times daily and should be two tabs three times daily; total 180 pills.  Please advise patient. Thanks.

## 2019-04-17 ENCOUNTER — TELEPHONE (OUTPATIENT)
Dept: FAMILY MEDICINE | Facility: HOSPITAL | Age: 54
End: 2019-04-17

## 2019-04-17 NOTE — TELEPHONE ENCOUNTER
----- Message from Helena Avendano sent at 4/17/2019  9:22 AM CDT -----  Pt needs Dr to send in cloNIDine (CATAPRES) 0.2 MG tablet 180 tablets because she is taking 2 tablets 3 times a day and he wrote a prescription for 1 tablet 3 times a day

## 2019-04-22 ENCOUNTER — TELEPHONE (OUTPATIENT)
Dept: FAMILY MEDICINE | Facility: HOSPITAL | Age: 54
End: 2019-04-22

## 2019-04-22 NOTE — TELEPHONE ENCOUNTER
----- Message from Helena Avendano sent at 4/22/2019  9:47 AM CDT -----  Pt needs a refill for cloNIDine (CATAPRES) 0.2 MG tablet. Dr wrote a prescription for 1pill 3 times a day and she takes 2 pills 3 times a day. Pt completley out

## 2019-04-23 DIAGNOSIS — I10 ESSENTIAL HYPERTENSION: ICD-10-CM

## 2019-04-23 RX ORDER — CLONIDINE HYDROCHLORIDE 0.2 MG/1
0.4 TABLET ORAL 3 TIMES DAILY
Qty: 180 TABLET | Refills: 5 | Status: SHIPPED | OUTPATIENT
Start: 2019-04-23 | End: 2019-10-09

## 2019-04-23 NOTE — TELEPHONE ENCOUNTER
----- Message from Helena Avendano sent at 4/23/2019  9:19 AM CDT -----  Pt needs a refill for cloNIDine (CATAPRES) 0.2 MG tablet. Dr wrote a prescription for 1pill 3 times a day and she takes 2 pills 3 times a day. Pt completley out. 3rd message sent pt is upset

## 2019-05-31 ENCOUNTER — OFFICE VISIT (OUTPATIENT)
Dept: FAMILY MEDICINE | Facility: HOSPITAL | Age: 54
End: 2019-05-31
Payer: MEDICAID

## 2019-05-31 VITALS
BODY MASS INDEX: 25.6 KG/M2 | HEIGHT: 62 IN | HEART RATE: 78 BPM | WEIGHT: 139.13 LBS | SYSTOLIC BLOOD PRESSURE: 140 MMHG | DIASTOLIC BLOOD PRESSURE: 71 MMHG

## 2019-05-31 DIAGNOSIS — M32.9 SYSTEMIC LUPUS ERYTHEMATOSUS, UNSPECIFIED SLE TYPE, UNSPECIFIED ORGAN INVOLVEMENT STATUS: ICD-10-CM

## 2019-05-31 DIAGNOSIS — I10 ESSENTIAL HYPERTENSION: ICD-10-CM

## 2019-05-31 DIAGNOSIS — I73.9 PERIPHERAL ARTERIAL DISEASE: ICD-10-CM

## 2019-05-31 DIAGNOSIS — D68.59 PROTEIN C DEFICIENCY: ICD-10-CM

## 2019-05-31 DIAGNOSIS — E11.9 TYPE 2 DIABETES MELLITUS WITHOUT OPHTHALMIC MANIFESTATIONS: Primary | ICD-10-CM

## 2019-05-31 PROCEDURE — 99214 OFFICE O/P EST MOD 30 MIN: CPT | Performed by: FAMILY MEDICINE

## 2019-05-31 RX ORDER — SIMVASTATIN 40 MG/1
40 TABLET, FILM COATED ORAL DAILY
Qty: 90 TABLET | Refills: 1 | Status: SHIPPED | OUTPATIENT
Start: 2019-05-31 | End: 2019-10-09 | Stop reason: SDUPTHER

## 2019-05-31 RX ORDER — HYDROCODONE BITARTRATE AND ACETAMINOPHEN 5; 325 MG/1; MG/1
TABLET ORAL
COMMUNITY
Start: 2019-04-05 | End: 2019-05-31

## 2019-05-31 RX ORDER — AMOXICILLIN AND CLAVULANATE POTASSIUM 500; 125 MG/1; MG/1
TABLET, FILM COATED ORAL
Refills: 0 | COMMUNITY
Start: 2019-04-01 | End: 2019-05-31

## 2019-05-31 RX ORDER — CHLORHEXIDINE GLUCONATE ORAL RINSE 1.2 MG/ML
SOLUTION DENTAL
Refills: 0 | COMMUNITY
Start: 2019-04-01 | End: 2019-10-09

## 2019-05-31 RX ORDER — GABAPENTIN 600 MG/1
1200 TABLET ORAL 3 TIMES DAILY
Qty: 180 TABLET | Refills: 5 | Status: SHIPPED | OUTPATIENT
Start: 2019-05-31 | End: 2019-10-09 | Stop reason: SDUPTHER

## 2019-05-31 RX ORDER — LOSARTAN POTASSIUM 100 MG/1
100 TABLET ORAL DAILY
Qty: 90 TABLET | Refills: 1 | Status: SHIPPED | OUTPATIENT
Start: 2019-05-31 | End: 2019-10-09 | Stop reason: SDUPTHER

## 2019-05-31 NOTE — PROGRESS NOTES
"Subjective:       Patient ID: Aggie Rasheed is a 53 y.o. female.    Chief Complaint: Hypertension (follow-up)    Ms. Rasheed is a 52 year old female with PMH, SLE, DMII, Protein C def, iron def anemia and chronic migraine who presents for DMII and HTN f/u.    PAD: continues to have leg claudication symptoms, still smoking a PPD. Tried Chantix, had severe N. Tried gums and patches in the past. Does want to quit. Considering vaping. States she isn't exercising much due to the pain.     OA Neck: upper back pain continued in thoracic, cervical region. Aching in nature, chronic and continuous in nature.     HTN: taking losartan 100mg daily, states BP at home have been running around 130-140's. Denies LH/troubles urinating or medication side effects. Clonidine 3 tabs in the AM and 3 tabs in the PM of 0.2mg, states she tried TID and "it didn't work".     States she saw a hematologist, xarelto is okay to continue per recs.      DMII: stable, well-conrolled. Taking metformin. Recently saw podiatrist, no loss of feeling or neuropathy.     Doesn't drink.         Review of Systems   Constitutional: Negative for chills and fever.   HENT: Negative for congestion and sore throat.        Objective:      Vitals:    05/31/19 0954   BP: (!) 140/71   Pulse: 78     Physical Exam   Constitutional: She is oriented to person, place, and time. She appears well-developed and well-nourished. No distress.   HENT:   Head: Normocephalic and atraumatic.   Right Ear: External ear normal.   Left Ear: External ear normal.   Eyes: Pupils are equal, round, and reactive to light. Conjunctivae are normal. Right eye exhibits no discharge. Left eye exhibits no discharge.   Neck: Normal range of motion. No JVD present. No thyromegaly present.   Cardiovascular: Normal rate and regular rhythm. Exam reveals no gallop and no friction rub.   No murmur heard.  Pulmonary/Chest: Effort normal and breath sounds normal. No respiratory distress. She has no " wheezes. She has no rales.   Musculoskeletal: Normal range of motion.   Neurological: She is alert and oriented to person, place, and time. She has normal reflexes.   Skin: Skin is warm. No rash noted. No erythema.   Psychiatric: She has a normal mood and affect. Her behavior is normal.       Assessment:       1. Type 2 diabetes mellitus without ophthalmic manifestations    2. Essential hypertension    3. Systemic lupus erythematosus, unspecified SLE type, unspecified organ involvement status    4. Protein C deficiency    5. Peripheral arterial disease        Plan:       Type 2 diabetes mellitus without ophthalmic manifestations  -     simvastatin (ZOCOR) 40 MG tablet; Take 1 tablet (40 mg total) by mouth once daily.  Dispense: 90 tablet; Refill: 1  -     gabapentin (NEURONTIN) 600 MG tablet; Take 2 tablets (1,200 mg total) by mouth 3 (three) times daily.  Dispense: 180 tablet; Refill: 5  -     Ambulatory Referral to Physical/Occupational Therapy    Essential hypertension  -     losartan (COZAAR) 100 MG tablet; Take 1 tablet (100 mg total) by mouth once daily.  Dispense: 90 tablet; Refill: 1    Systemic lupus erythematosus, unspecified SLE type, unspecified organ involvement status    Protein C deficiency    Peripheral arterial disease  -     Ambulatory Referral to Physical/Occupational Therapy    Will increase Losartan to 100mg daily, counseled patient again on appropriate Clonidin dosing of TID (0.4mg TID). Counseled extensively on smoking cessation, patient will trial vaporizer, counseled on quit date. Will trial graded exercises regimen for PAD as well. All questions answered.     Follow up in about 3 months (around 8/31/2019).

## 2019-08-06 DIAGNOSIS — G43.009 MIGRAINE WITHOUT AURA AND WITHOUT STATUS MIGRAINOSUS, NOT INTRACTABLE: ICD-10-CM

## 2019-08-06 RX ORDER — PROPRANOLOL HYDROCHLORIDE 40 MG/1
40 TABLET ORAL 3 TIMES DAILY
Qty: 90 TABLET | Refills: 5 | Status: SHIPPED | OUTPATIENT
Start: 2019-08-06 | End: 2019-10-09 | Stop reason: SDUPTHER

## 2019-08-06 NOTE — TELEPHONE ENCOUNTER
----- Message from Liliane Coronado MA sent at 8/6/2019  8:29 AM CDT -----  Patient requesting refill on propranolol (INDERAL) 40 MG tab.  Please send to pharmacy.  Thanks.

## 2019-08-14 ENCOUNTER — HOSPITAL ENCOUNTER (EMERGENCY)
Facility: HOSPITAL | Age: 54
Discharge: HOME | End: 2019-08-14
Attending: EMERGENCY MEDICINE
Payer: MEDICAID

## 2019-08-14 VITALS
BODY MASS INDEX: 25.03 KG/M2 | OXYGEN SATURATION: 95 % | HEIGHT: 62 IN | RESPIRATION RATE: 16 BRPM | SYSTOLIC BLOOD PRESSURE: 136 MMHG | TEMPERATURE: 98 F | WEIGHT: 136 LBS | DIASTOLIC BLOOD PRESSURE: 68 MMHG | HEART RATE: 71 BPM

## 2019-08-14 DIAGNOSIS — S61.011A LACERATION OF RIGHT THUMB WITHOUT FOREIGN BODY WITHOUT DAMAGE TO NAIL, INITIAL ENCOUNTER: Primary | ICD-10-CM

## 2019-08-14 DIAGNOSIS — I10 ELEVATED BLOOD PRESSURE READING WITH DIAGNOSIS OF HYPERTENSION: ICD-10-CM

## 2019-08-14 PROCEDURE — 99282 EMERGENCY DEPT VISIT SF MDM: CPT | Mod: 25

## 2019-08-14 PROCEDURE — 12001 RPR S/N/AX/GEN/TRNK 2.5CM/<: CPT

## 2019-08-14 PROCEDURE — 63600175 PHARM REV CODE 636 W HCPCS: Performed by: NURSE PRACTITIONER

## 2019-08-14 PROCEDURE — 90715 TDAP VACCINE 7 YRS/> IM: CPT | Performed by: NURSE PRACTITIONER

## 2019-08-14 PROCEDURE — 25000003 PHARM REV CODE 250: Performed by: NURSE PRACTITIONER

## 2019-08-14 PROCEDURE — 90471 IMMUNIZATION ADMIN: CPT | Performed by: NURSE PRACTITIONER

## 2019-08-14 RX ORDER — LIDOCAINE HYDROCHLORIDE AND EPINEPHRINE 10; 10 MG/ML; UG/ML
10 INJECTION, SOLUTION INFILTRATION; PERINEURAL
Status: COMPLETED | OUTPATIENT
Start: 2019-08-14 | End: 2019-08-14

## 2019-08-14 RX ADMIN — LIDOCAINE HYDROCHLORIDE AND EPINEPHRINE 10 ML: 10; 10 INJECTION, SOLUTION INFILTRATION; PERINEURAL at 05:08

## 2019-08-14 RX ADMIN — CLOSTRIDIUM TETANI TOXOID ANTIGEN (FORMALDEHYDE INACTIVATED), CORYNEBACTERIUM DIPHTHERIAE TOXOID ANTIGEN (FORMALDEHYDE INACTIVATED), BORDETELLA PERTUSSIS TOXOID ANTIGEN (GLUTARALDEHYDE INACTIVATED), BORDETELLA PERTUSSIS FILAMENTOUS HEMAGGLUTININ ANTIGEN (FORMALDEHYDE INACTIVATED), BORDETELLA PERTUSSIS PERTACTIN ANTIGEN, AND BORDETELLA PERTUSSIS FIMBRIAE 2/3 ANTIGEN 0.5 ML: 5; 2; 2.5; 5; 3; 5 INJECTION, SUSPENSION INTRAMUSCULAR at 05:08

## 2019-08-14 NOTE — DISCHARGE INSTRUCTIONS
Keep the area clean and dry.  Follow-up primary care physician or [facility] in 7-10 days for suture removal.  Watch for signs of infection including any worsening redness, pus, warmth, or fever.  If you notice these things please follow up with your primary care physician immediately return to emergency department.  Take OTC ibuprofen or Tylenol as labeled as needed for pain. Refer to the additional materials provided for further information including when to return to the emergency department.  Also please have blood pressure recheck in approximately 1 week by PCP and keep a daily recording of blood pressure readings.

## 2019-08-14 NOTE — ED TRIAGE NOTES
Patient reports cutting her finger after attempting to open a box with a knife. Patient states laceration has been bleeding for about an hour and she is on blood thinner. Patient states cut is in the bend of her finger and it begins to bleed again any time she bends her finger.

## 2019-08-14 NOTE — ED NOTES
APPEARANCE: Alert, oriented and in no acute distress.  CARDIAC: Normal rate and rhythm, no murmur heard.   PERIPHERAL VASCULAR: peripheral pulses present. Normal cap refill. No edema. Warm to touch.    RESPIRATORY:Normal rate and effort, breath sounds clear bilaterally throughout chest. Respirations are equal and unlabored no obvious signs of distress.  GASTRO: soft, bowel sounds normal, no tenderness, no abdominal distention.  MUSC: Full ROM. No bony tenderness or soft tissue tenderness. No obvious deformity. Patient has a 1cm laceration to the medial aspect of her left thumb in the bend of her finger that is bleeding continuously.   SKIN: Skin is warm and dry, normal skin turgor, mucous membranes moist.  NEURO: 5/5 strength major flexors/extensors bilaterally. Sensory intact to light touch bilaterally. [place] coma scale: eyes open spontaneously-4, oriented & converses-5, obeys commands-6. No neurological abnormalities.   MENTAL STATUS: awake, alert and aware of environment.  EYE: PERRL, both eyes: pupils brisk and reactive to light. Normal size.  ENT: EARS: no obvious drainage. NOSE: no active bleeding.

## 2019-08-14 NOTE — ED PROVIDER NOTES
Encounter Date: 2019       History     Chief Complaint   Patient presents with    Laceration     laceration to R thumb from kitchen knife, on Xarelto     54-year-old female presents ED for evaluation of laceration to right thumb that occurred prior to arrival.  Patient states that she was using her kitchen knife and accidentally cut her right thumb. Patient states that she is on Xarelto and she could not get the bleeding controlled at home. Patient states that she cut herself in the bend of her finger and it bleeds every time she bends it. Bleeding controlled in triage. Not up-to-date on tetanus. No treatments tried. Denies fever, numbness, tingling, weakness, or any other concerns.     The history is provided by the patient.     Review of patient's allergies indicates:   Allergen Reactions    Niacin preparations Other (See Comments)     Red inflammed eyes    Codeine Rash and Other (See Comments)     Past Medical History:   Diagnosis Date    Diabetes mellitus type II     Headache associated with hormonal factors     Hyperlipidemia     Hypertension     Lupus     Protein C deficiency     Tachycardia      Past Surgical History:   Procedure Laterality Date    AORTIC VALVE SURGERY      ASPIRATION-THYROID N/A 2017    Performed by [facility]    CAPSULE ENDOSCOPY N/A 2017    [facility]     SECTION      COLONOSCOPY N/A 2017    [facility]    ESOPHAGOGASTRODUODENOSCOPY (EGD) N/A 2017    [facility]    TOE AMPUTATION      TONSILLECTOMY      WRIST FRACTURE SURGERY       Family History   Problem Relation Age of Onset    Hypertension Mother     Cancer Father     Diabetes Maternal Uncle     Stroke Maternal Grandfather     Colon cancer Paternal Aunt      Social History     Tobacco Use    Smoking status: Current Every Day Smoker     Packs/day: 1.00      Types: Cigarettes    Smokeless tobacco: Never Used   Substance Use Topics    Alcohol use: No    Drug use: No     Review of Systems   Constitutional: Negative for fever.   Skin: Positive for wound.   Hematological: Bruises/bleeds easily.   All other systems reviewed and are negative.      Physical Exam     Initial Vitals [08/14/19 1720]   BP Pulse Resp Temp SpO2   (!) 189/84 78 16 98.6 °F (37 °C) 99 %      MAP       --         Physical Exam    Vitals reviewed.  Constitutional: She appears well-developed and well-nourished.  Non-toxic appearance. She does not have a sickly appearance.   HENT:   Head: Atraumatic.   Mouth/Throat: Oropharynx is clear and moist.   Eyes: EOM are normal.   Neck: Normal range of motion, full passive range of motion without pain and phonation normal. Neck supple.   Cardiovascular: Regular rhythm.   Pulses:       Radial pulses are 2+ on the right side, and 2+ on the left side.   Asymptomatic hypertension.   Pulmonary/Chest: No respiratory distress.   Musculoskeletal:   Sensation and strength intact in BUE.  Radial pulses equal bilaterally.   Neurological: She is alert and oriented to person, place, and time. She has normal strength. No sensory deficit.   Skin: Skin is warm. Laceration noted.   Approximately 1 cm laceration noted in the bend of right thumb.  Wound bed visualized, no foreign bodies.  Bleeding controlled.     Psychiatric: She has a normal mood and affect.         ED Course   Lac Repair  Date/Time: 8/14/2019 6:22 PM  Performed by: John Gonzalez NP  Authorized by: Torey Madrid MD   Consent Done: Not Needed  Body area: upper extremity  Location details: right thumb  Laceration length: 1 cm  Foreign bodies: no foreign bodies  Tendon involvement: none  Nerve involvement: none    Anesthesia:  Local Anesthetic: lidocaine 1% with epinephrine  Patient sedated: no  Preparation: Patient was prepped and draped in the usual sterile fashion.  Irrigation solution:  saline  Amount of cleaning: standard  Debridement: none  Degree of undermining: none  Skin closure: 5-0 Prolene  Number of sutures: 3  Technique: simple  Approximation: close  Approximation difficulty: simple  Dressing: dressing applied  Patient tolerance: Patient tolerated the procedure well with no immediate complications        Labs Reviewed - No data to display       Imaging Results    None          Medical Decision Making:   History:   Old Medical Records: I decided to obtain old medical records.  Initial Assessment:   54-year-old female presents ED for evaluation of laceration to right thumb that occurred prior to arrival.  Patient states that she was using her kitchen knife and accidentally cut her right thumb. Patient states that she is on Xarelto and she could not get the bleeding controlled at home. Patient states that she cut herself in the bend of her finger and it bleeds every time she bends it. Bleeding controlled in triage. Not up-to-date on tetanus. No treatments tried. Denies fever, numbness, tingling, weakness, or any other concerns.  Appears well, nontoxic.  Afebrile.  Hypertensive in ED.  Denies symptoms of hypertension including any vision changes, dizziness, headache, shortness of breath, or chest pain.      ED Management:  Sutures   Wound clean and irrigated, lac repair; see procedure note.  No x-ray warranted at this time as wound is a superficial laceration and patient denies risk of foreign body and no signs of bony abnormality, vascular or tendon injury. After complete evaluation, including thorough history and physical exam, the patient's injury and laceration was deemed to be appropriate for primary closure in ED.  The wound was irrigated extensively inspected for foreign bodies, underlying structure injury, or other associated complications, and none were found.  The wound was repaired using Prolene suture.  The patient was given appropriate wound care instructions, including keeping  wound clean, use of sterile bandages, and avoiding submersion in water.  Due to the nature of the wound, no antibiotics were deemed necessary.  Patient's blood pressure was elevated in ED today.  I believe patient's blood pressure was elevated due to patient's situation and pain. I do not suspect ACS, hypertensive urgency, or emergency.  Advised patient to have blood pressure repeated in approximately a week and to keep a daily recording of blood pressure readings.  Patient's blood pressure decreased in ED without intervention.  Patient instructed to follow up with PCP or return here to ED in 7-10 days for suture removal and if needed return sooner for any signs of infection, such as fever, redness, warmth, swelling, or drainage from wound.  Patient verbalized understanding, compliance, and agreement with treatment plan.                      Clinical Impression:       ICD-10-CM ICD-9-CM   1. Laceration of right thumb without foreign body without damage to nail, initial encounter S61.011A 883.0   2. Elevated blood pressure reading with diagnosis of hypertension I10 401.9                                John Gonzalez NP  08/14/19 1946

## 2019-10-09 ENCOUNTER — OFFICE VISIT (OUTPATIENT)
Dept: FAMILY MEDICINE | Facility: HOSPITAL | Age: 54
End: 2019-10-09
Attending: FAMILY MEDICINE
Payer: MEDICAID

## 2019-10-09 VITALS
SYSTOLIC BLOOD PRESSURE: 203 MMHG | HEART RATE: 86 BPM | DIASTOLIC BLOOD PRESSURE: 90 MMHG | WEIGHT: 138.25 LBS | BODY MASS INDEX: 25.44 KG/M2 | HEIGHT: 62 IN

## 2019-10-09 DIAGNOSIS — D68.59 PROTEIN C DEFICIENCY: ICD-10-CM

## 2019-10-09 DIAGNOSIS — G43.009 MIGRAINE WITHOUT AURA AND WITHOUT STATUS MIGRAINOSUS, NOT INTRACTABLE: ICD-10-CM

## 2019-10-09 DIAGNOSIS — M32.9 SYSTEMIC LUPUS ERYTHEMATOSUS, UNSPECIFIED SLE TYPE, UNSPECIFIED ORGAN INVOLVEMENT STATUS: ICD-10-CM

## 2019-10-09 DIAGNOSIS — I10 ESSENTIAL HYPERTENSION: ICD-10-CM

## 2019-10-09 DIAGNOSIS — E11.9 TYPE 2 DIABETES MELLITUS WITHOUT OPHTHALMIC MANIFESTATIONS: Primary | ICD-10-CM

## 2019-10-09 DIAGNOSIS — E78.5 HYPERLIPIDEMIA, UNSPECIFIED HYPERLIPIDEMIA TYPE: ICD-10-CM

## 2019-10-09 DIAGNOSIS — K21.9 GASTROESOPHAGEAL REFLUX DISEASE WITHOUT ESOPHAGITIS: ICD-10-CM

## 2019-10-09 DIAGNOSIS — Z23 NEED FOR PROPHYLACTIC VACCINATION AND INOCULATION AGAINST INFLUENZA: ICD-10-CM

## 2019-10-09 PROCEDURE — 99213 OFFICE O/P EST LOW 20 MIN: CPT | Mod: 25 | Performed by: STUDENT IN AN ORGANIZED HEALTH CARE EDUCATION/TRAINING PROGRAM

## 2019-10-09 PROCEDURE — 90686 IIV4 VACC NO PRSV 0.5 ML IM: CPT

## 2019-10-09 RX ORDER — SIMVASTATIN 40 MG/1
40 TABLET, FILM COATED ORAL DAILY
Qty: 90 TABLET | Refills: 1 | Status: SHIPPED | OUTPATIENT
Start: 2019-10-09 | End: 2019-12-12

## 2019-10-09 RX ORDER — METFORMIN HYDROCHLORIDE 1000 MG/1
1000 TABLET ORAL 2 TIMES DAILY
Qty: 90 TABLET | Refills: 3 | Status: SHIPPED | OUTPATIENT
Start: 2019-10-09 | End: 2020-06-08 | Stop reason: SDUPTHER

## 2019-10-09 RX ORDER — OMEPRAZOLE 40 MG/1
40 CAPSULE, DELAYED RELEASE ORAL DAILY
Qty: 30 CAPSULE | Refills: 11 | Status: SHIPPED | OUTPATIENT
Start: 2019-10-09 | End: 2023-06-01

## 2019-10-09 RX ORDER — GABAPENTIN 600 MG/1
1200 TABLET ORAL 3 TIMES DAILY
Qty: 180 TABLET | Refills: 5 | Status: SHIPPED | OUTPATIENT
Start: 2019-10-09 | End: 2019-10-15

## 2019-10-09 RX ORDER — CLONIDINE 0.3 MG/24H
1 PATCH, EXTENDED RELEASE TRANSDERMAL
Qty: 4 PATCH | Refills: 11 | Status: SHIPPED | OUTPATIENT
Start: 2019-10-09 | End: 2019-10-18

## 2019-10-09 RX ORDER — LOSARTAN POTASSIUM 100 MG/1
100 TABLET ORAL DAILY
Qty: 90 TABLET | Refills: 3 | Status: SHIPPED | OUTPATIENT
Start: 2019-10-09 | End: 2020-11-11 | Stop reason: SDUPTHER

## 2019-10-09 RX ORDER — HYDROXYCHLOROQUINE SULFATE 200 MG/1
TABLET, FILM COATED ORAL
Qty: 60 TABLET | Refills: 12 | Status: SHIPPED | OUTPATIENT
Start: 2019-10-09 | End: 2020-10-14 | Stop reason: SDUPTHER

## 2019-10-09 RX ORDER — PROPRANOLOL HYDROCHLORIDE 40 MG/1
40 TABLET ORAL 3 TIMES DAILY
Qty: 90 TABLET | Refills: 5 | Status: SHIPPED | OUTPATIENT
Start: 2019-10-09 | End: 2019-11-20

## 2019-10-09 NOTE — PROGRESS NOTES
Subjective:       Patient ID: Aggie Rasheed is a 54 y.o. female.    Chief Complaint: DM2 Management     HPI   55 y/o female with a pmhx of HTN, HLD, DM2, Protein C deficiency, who presents to clinic today for follow up. Patient has been tolerating her medication regimen well. No new complaints. Lifestyle and dietary modifications were discussed. Patient's blood pressure is elevated, however has not taken her clonidine. Patient states that she follows up with ophthalmologist yearly 2/2 for hydroxychlorquine. Patient also follows up with podiatry for diabetic foot checks/ingrown toe nail.     Review of Systems   Constitutional: Negative for activity change and appetite change.   HENT: Negative for trouble swallowing.    Eyes: Negative for visual disturbance.   Respiratory: Negative for shortness of breath.    Cardiovascular: Negative for chest pain and palpitations.   Gastrointestinal: Negative for abdominal distention, diarrhea, nausea and vomiting.   Endocrine: Negative for cold intolerance and heat intolerance.   Genitourinary: Negative for flank pain.   Musculoskeletal: Negative for arthralgias.   Skin: Negative for color change.   Allergic/Immunologic: Negative for immunocompromised state.   Neurological: Negative for headaches.   Hematological: Negative for adenopathy.   Psychiatric/Behavioral: Negative for agitation.       Objective:      Vitals:    10/09/19 1602   BP: (!) 203/90   Pulse: 86     Physical Exam   Constitutional: She is oriented to person, place, and time. She appears well-developed and well-nourished.   HENT:   Head: Normocephalic and atraumatic.   Eyes: Pupils are equal, round, and reactive to light. EOM are normal.   Neck: Normal range of motion. Neck supple.   Cardiovascular: Normal rate, regular rhythm, normal heart sounds and intact distal pulses. Exam reveals no gallop and no friction rub.   No murmur heard.  Pulmonary/Chest: Effort normal and breath sounds normal. She has no wheezes.  She has no rales.   Abdominal: Soft. Bowel sounds are normal. She exhibits no distension. There is no tenderness.   Musculoskeletal: Normal range of motion.   Neurological: She is alert and oriented to person, place, and time.   Skin: Skin is warm and dry. Capillary refill takes less than 2 seconds.   Psychiatric: She has a normal mood and affect.       Assessment:       1. Type 2 diabetes mellitus without ophthalmic manifestations    2. Essential hypertension    3. Migraine without aura and without status migrainosus, not intractable    4. Systemic lupus erythematosus, unspecified SLE type, unspecified organ involvement status    5. Gastroesophageal reflux disease without esophagitis    6. Need for prophylactic vaccination and inoculation against influenza    7. Hyperlipidemia, unspecified hyperlipidemia type    8. Protein C deficiency        Plan:       Type 2 diabetes mellitus without ophthalmic manifestations  -     Hemoglobin A1c; Future; Expected date: 10/09/2019  -     Lipid panel; Future; Expected date: 10/09/2019  -     Comprehensive metabolic panel; Future; Expected date: 10/09/2019  -     simvastatin (ZOCOR) 40 MG tablet; Take 1 tablet (40 mg total) by mouth once daily.  Dispense: 90 tablet; Refill: 1  -     metFORMIN (GLUCOPHAGE) 1000 MG tablet; Take 1 tablet (1,000 mg total) by mouth 2 (two) times daily.  Dispense: 90 tablet; Refill: 3  -     gabapentin (NEURONTIN) 600 MG tablet; Take 2 tablets (1,200 mg total) by mouth 3 (three) times daily.  Dispense: 180 tablet; Refill: 5    Essential hypertension  -     losartan (COZAAR) 100 MG tablet; Take 1 tablet (100 mg total) by mouth once daily.  Dispense: 90 tablet; Refill: 3  -     cloNIDine 0.3 mg/24 hr td ptwk (CATAPRES) 0.3 mg/24 hr; Place 1 patch onto the skin every 7 days.  Dispense: 4 patch; Refill: 11    Migraine without aura and without status migrainosus, not intractable  -     propranolol (INDERAL) 40 MG tablet; Take 1 tablet (40 mg total) by mouth  3 (three) times daily.  Dispense: 90 tablet; Refill: 5    Systemic lupus erythematosus, unspecified SLE type, unspecified organ involvement status  -     hydroxychloroquine (PLAQUENIL) 200 mg tablet; TK 1 T PO BID  Dispense: 60 tablet; Refill: 12    Gastroesophageal reflux disease without esophagitis  -     omeprazole (PRILOSEC) 40 MG capsule; Take 1 capsule (40 mg total) by mouth once daily.  Dispense: 30 capsule; Refill: 11    Need for prophylactic vaccination and inoculation against influenza  -     Flu Vaccine - Quadrivalent (PF) (6 months & older)    Hyperlipidemia, unspecified hyperlipidemia type    Protein C deficiency  -     rivaroxaban (XARELTO) 20 mg Tab; Take 1 tablet (20 mg total) by mouth daily with dinner or evening meal.  Dispense: 90 tablet; Refill: 3      Follow up in about 1 month (around 11/9/2019).      Reynaldo Cobos MD   [facility], PGY-2

## 2019-10-15 ENCOUNTER — TELEPHONE (OUTPATIENT)
Dept: FAMILY MEDICINE | Facility: HOSPITAL | Age: 54
End: 2019-10-15

## 2019-10-15 RX ORDER — GABAPENTIN 600 MG/1
600 TABLET ORAL 3 TIMES DAILY
Qty: 90 TABLET | Refills: 11 | Status: SHIPPED | OUTPATIENT
Start: 2019-10-15 | End: 2019-10-21

## 2019-10-16 RX ORDER — AMLODIPINE BESYLATE 10 MG/1
10 TABLET ORAL DAILY
Qty: 2 TABLET | Refills: 0 | Status: SHIPPED | OUTPATIENT
Start: 2019-10-16 | End: 2019-11-06

## 2019-10-16 NOTE — PROGRESS NOTES
Patient's pharmacy contacted and will fill clonidine patches with the named brand CATAPRES, which does not need a prior auth. This was explained to patient on multiple occasions. Patient states that she does not feel comfortable being without clonidine for 2 days, while patches are being filled as they are not available at her pharmacy. Will start patient on amlodipine 10mg PO daily x 2 days with no refills. Patient instructed to take amlodipine alone and not combine with clonidine. Once the clonidine patch is available and amlodipine has been cleared by patient's body will it be safe to take the clonidine patches. Patient is in agreement with this plan and understands the benefits and risks of starting this medicine. Side effects discussed. Patient instructed to report to the ED if any acute complications arise. Patient reported understanding and agrees with plan before completion of phone call at approximately 5:10 pm on 10/16/19.     Reynaldo Cobos MD  [facility], PGY-2

## 2019-10-16 NOTE — TELEPHONE ENCOUNTER
----- Message from Liliane Coronado MA sent at 10/16/2019  9:57 AM CDT -----  Patient called again; requests call asap.  Has no meds and is panicking. Please call patient.  Thanks.  ----- Message -----  From: Liliane Coronado MA  Sent: 10/15/2019   8:36 AM CDT  To: Chandrika Jacobs Staff    Patient was seen last week.  States the clonidine patches need prior auth and pharmacy has sent request.  Please advise.  Thanks.

## 2019-10-18 ENCOUNTER — TELEPHONE (OUTPATIENT)
Dept: FAMILY MEDICINE | Facility: HOSPITAL | Age: 54
End: 2019-10-18

## 2019-10-18 RX ORDER — CLONIDINE HYDROCHLORIDE 0.2 MG/1
0.2 TABLET ORAL 3 TIMES DAILY
Qty: 90 TABLET | Refills: 11 | Status: SHIPPED | OUTPATIENT
Start: 2019-10-18 | End: 2019-11-20

## 2019-10-18 NOTE — TELEPHONE ENCOUNTER
"Patient calling. She applied Catapres 0.3mg patch yesterday at 2p.m at 5p.m her B/P was 224/106, at 10:30 p.m B/P 236/114. This morning at 7:45 a.m. B/P 228/130. Patient states that she feels like "my eye are popping out of my head." also states that she has vomitted and feels like she is having withdrawal from the Clonidine that Dr. Cobos took her off of. Patient feels she "should have been weaned off of the Clonidine". Advised that I would talk to Dr. Cobos and get back with her.  "

## 2019-10-21 ENCOUNTER — TELEPHONE (OUTPATIENT)
Dept: FAMILY MEDICINE | Facility: HOSPITAL | Age: 54
End: 2019-10-21

## 2019-10-21 RX ORDER — GABAPENTIN 600 MG/1
600 TABLET ORAL 3 TIMES DAILY
Qty: 90 TABLET | Refills: 11 | Status: SHIPPED | OUTPATIENT
Start: 2019-10-21 | End: 2020-04-02 | Stop reason: SDUPTHER

## 2019-10-22 NOTE — PROGRESS NOTES
Gabapentin 600mg TID is listed as a preferred medication and should be covered. Patient should contact pharmacy/health insurance to discus coverage.

## 2019-10-24 ENCOUNTER — TELEPHONE (OUTPATIENT)
Dept: FAMILY MEDICINE | Facility: HOSPITAL | Age: 54
End: 2019-10-24

## 2019-10-24 NOTE — TELEPHONE ENCOUNTER
----- Message from Liliane Coronado MA sent at 10/24/2019 10:18 AM CDT -----  Patient's pressure running 148/90; was higher so she took extra pill.  Please call patient at number above; wants to discuss further.  Thanks.

## 2019-10-29 ENCOUNTER — TELEPHONE (OUTPATIENT)
Dept: FAMILY MEDICINE | Facility: HOSPITAL | Age: 54
End: 2019-10-29

## 2019-10-29 NOTE — TELEPHONE ENCOUNTER
----- Message from Anna Toledo sent at 10/25/2019 11:43 AM CDT -----  PT NEED TO SPEAK TO DR OSWALD ABOUT HER BLOOD PRESSURE

## 2019-10-30 ENCOUNTER — TELEPHONE (OUTPATIENT)
Dept: FAMILY MEDICINE | Facility: HOSPITAL | Age: 54
End: 2019-10-30

## 2019-11-06 ENCOUNTER — OFFICE VISIT (OUTPATIENT)
Dept: FAMILY MEDICINE | Facility: HOSPITAL | Age: 54
End: 2019-11-06
Attending: FAMILY MEDICINE
Payer: MEDICAID

## 2019-11-06 VITALS
DIASTOLIC BLOOD PRESSURE: 116 MMHG | HEIGHT: 62 IN | HEART RATE: 99 BPM | WEIGHT: 137.13 LBS | SYSTOLIC BLOOD PRESSURE: 244 MMHG | BODY MASS INDEX: 25.23 KG/M2

## 2019-11-06 DIAGNOSIS — E11.9 TYPE 2 DIABETES MELLITUS WITHOUT COMPLICATION, WITHOUT LONG-TERM CURRENT USE OF INSULIN: ICD-10-CM

## 2019-11-06 DIAGNOSIS — I10 ESSENTIAL HYPERTENSION: ICD-10-CM

## 2019-11-06 DIAGNOSIS — I16.0 HYPERTENSIVE URGENCY: Primary | ICD-10-CM

## 2019-11-06 DIAGNOSIS — E78.2 MIXED HYPERLIPIDEMIA: ICD-10-CM

## 2019-11-06 PROCEDURE — 99214 OFFICE O/P EST MOD 30 MIN: CPT | Performed by: STUDENT IN AN ORGANIZED HEALTH CARE EDUCATION/TRAINING PROGRAM

## 2019-11-06 RX ORDER — ISOSORBIDE MONONITRATE 60 MG/1
60 TABLET, EXTENDED RELEASE ORAL DAILY
Qty: 30 TABLET | Refills: 11 | Status: SHIPPED | OUTPATIENT
Start: 2019-11-06 | End: 2019-11-13

## 2019-11-06 RX ORDER — NIFEDIPINE 60 MG/1
60 TABLET, EXTENDED RELEASE ORAL DAILY
Qty: 30 TABLET | Refills: 11 | Status: SHIPPED | OUTPATIENT
Start: 2019-11-06 | End: 2019-11-20

## 2019-11-06 NOTE — PROGRESS NOTES
Subjective:       Patient ID: Aggie Rasheed is a 54 y.o. female.    Chief Complaint: HTN Management     HPI   53 y/o female with a pmhx of HTN, HLD, DM2, Protein C deficiency, who presents to clinic today for follow up. Patient states that she has continued uncontrolled HTN on current regimen. Patient was advised to go to the ED for Blood pressure control, however declined. Benefits and risks discussed and alternative plan to meet patient's needs tailored to her requests.      Review of Systems   Constitutional: Negative for activity change and appetite change.   HENT: Negative for trouble swallowing.    Eyes: Negative for visual disturbance.   Respiratory: Negative for shortness of breath.    Cardiovascular: Negative for chest pain and palpitations.   Gastrointestinal: Negative for abdominal distention, diarrhea, nausea and vomiting.   Endocrine: Negative for cold intolerance and heat intolerance.   Genitourinary: Negative for flank pain.   Musculoskeletal: Negative for arthralgias.   Skin: Negative for color change.   Allergic/Immunologic: Negative for immunocompromised state.   Neurological: Negative for headaches.   Hematological: Negative for adenopathy.   Psychiatric/Behavioral: Negative for agitation.       Objective:      Vitals:    11/06/19 1459   BP: (!) 244/116   Pulse: 99        Physical Exam   Constitutional: She is oriented to person, place, and time. She appears well-developed and well-nourished.   HENT:   Head: Normocephalic and atraumatic.   Eyes: Pupils are equal, round, and reactive to light. EOM are normal.   Neck: Normal range of motion. Neck supple.   Cardiovascular: Normal rate, regular rhythm, normal heart sounds and intact distal pulses. Exam reveals no gallop and no friction rub.   No murmur heard.  Pulmonary/Chest: Effort normal and breath sounds normal. She has no wheezes. She has no rales.   Abdominal: Soft. Bowel sounds are normal. She exhibits no distension. There is no tenderness.    Musculoskeletal: Normal range of motion.   Neurological: She is alert and oriented to person, place, and time.   Skin: Skin is warm and dry. Capillary refill takes less than 2 seconds.   Psychiatric: She has a normal mood and affect.       Assessment:       1. Hypertensive urgency    2. Essential hypertension    3. Type 2 diabetes mellitus without complication, without long-term current use of insulin    4. Mixed hyperlipidemia        Plan:       Hypertensive urgency        -     /116         -     Patient recommended to go to the ER but refused. Patient reports  previous brain bleed in the past 2/2 to uncontrolled HTN. Despite extensive conversation of the benefits and risks, patient still refused to go to the ER. Alternative plan now is to allow patient to continue clonidine and start nifedipine and then Imdur the following day. Patient instructed not to take both medicines at once as they may affect cerebral perfusion and cause end organ damage leading to poor outcome. This was discussed with the patient and she reports understanding of the potential risks of not being closely monitored by medical professionals in the hospital. Patient also instructed to use home blood pressure monitoring device to log blood pressures 3 x a day and bring this log to clinic in 1 week to assess current treatment plan.     Essential hypertension  -     isosorbide mononitrate (IMDUR) 60 MG 24 hr tablet; Take 1 tablet (60 mg total) by mouth once daily.  Dispense: 30 tablet; Refill: 11  -     NIFEdipine (PROCARDIA-XL) 60 MG (OSM) 24 hr tablet; Take 1 tablet (60 mg total) by mouth once daily.  Dispense: 30 tablet; Refill: 11    Type 2 diabetes mellitus without complication, without long-term current use of insulin          -    A1c 6.7           -    Diet and lifestyle changes discussed           -   Patient to continue current medication regimen.     Mixed hyperlipidemia            -   Will continue Simvastatin 40mg PO daily      Follow up in about 1 week (around 11/13/2019).     Reynaldo Cobos MD   [facility], PGY-2

## 2019-11-13 ENCOUNTER — OFFICE VISIT (OUTPATIENT)
Dept: FAMILY MEDICINE | Facility: HOSPITAL | Age: 54
End: 2019-11-13
Attending: FAMILY MEDICINE
Payer: MEDICAID

## 2019-11-13 VITALS
BODY MASS INDEX: 24.67 KG/M2 | WEIGHT: 134.06 LBS | DIASTOLIC BLOOD PRESSURE: 92 MMHG | HEART RATE: 112 BPM | SYSTOLIC BLOOD PRESSURE: 182 MMHG | HEIGHT: 62 IN

## 2019-11-13 DIAGNOSIS — I10 ESSENTIAL HYPERTENSION: Primary | ICD-10-CM

## 2019-11-13 DIAGNOSIS — G43.009 MIGRAINE WITHOUT AURA AND WITHOUT STATUS MIGRAINOSUS, NOT INTRACTABLE: ICD-10-CM

## 2019-11-13 DIAGNOSIS — E78.2 MIXED HYPERLIPIDEMIA: ICD-10-CM

## 2019-11-13 DIAGNOSIS — E11.9 TYPE 2 DIABETES MELLITUS WITHOUT COMPLICATION, WITHOUT LONG-TERM CURRENT USE OF INSULIN: ICD-10-CM

## 2019-11-13 PROCEDURE — 99213 OFFICE O/P EST LOW 20 MIN: CPT | Performed by: STUDENT IN AN ORGANIZED HEALTH CARE EDUCATION/TRAINING PROGRAM

## 2019-11-13 RX ORDER — HYDRALAZINE HYDROCHLORIDE 50 MG/1
50 TABLET, FILM COATED ORAL 3 TIMES DAILY
Qty: 90 TABLET | Refills: 11 | Status: SHIPPED | OUTPATIENT
Start: 2019-11-13 | End: 2020-10-14 | Stop reason: SDUPTHER

## 2019-11-13 NOTE — PROGRESS NOTES
Subjective:       Patient ID: Aggie Rasheed is a 54 y.o. female.    Chief Complaint: HTN Management     HPI   53 y/o female with a pmhx of HTN, HLD, DM2, who presents to clinic today for follow up. Patient states that she has continued uncontrolled HTN on current regimen. Patient was advised to go to the ED for Blood pressure control ar prior visit, however declined.   Patient states that she does not tolerate the imdur which she has been taking at night, 2/2to diarrhea and HA. Patient willing to try hydralazine instead. Return precautions to ED discussed again. Patient tolerating therapy for DM2 and HLD.        BP home logs on Nifedipine and imdur as per below included:            Review of Systems   Constitutional: Negative for activity change and appetite change.   HENT: Negative for trouble swallowing.    Eyes: Negative for visual disturbance.   Respiratory: Negative for shortness of breath.    Cardiovascular: Negative for chest pain and palpitations.   Gastrointestinal: Negative for abdominal distention, diarrhea, nausea and vomiting.   Endocrine: Negative for cold intolerance and heat intolerance.   Genitourinary: Negative for flank pain.   Musculoskeletal: Negative for arthralgias.   Skin: Negative for color change.   Allergic/Immunologic: Negative for immunocompromised state.   Neurological: Negative for headaches.   Hematological: Negative for adenopathy.   Psychiatric/Behavioral: Negative for agitation.       Objective:      Vitals:    11/13/19 1331   BP: (!) 182/92   Pulse: (!) 112     Physical Exam   Constitutional: She is oriented to person, place, and time. She appears well-developed and well-nourished.   HENT:   Head: Normocephalic and atraumatic.   Eyes: Pupils are equal, round, and reactive to light. EOM are normal.   Neck: Normal range of motion. Neck supple.   Cardiovascular: Normal rate, regular rhythm, normal heart sounds and intact distal pulses. Exam reveals no gallop and no friction rub.    No murmur heard.  Pulmonary/Chest: Effort normal and breath sounds normal. She has no wheezes. She has no rales.   Abdominal: Soft. Bowel sounds are normal. She exhibits no distension. There is no tenderness.   Musculoskeletal: Normal range of motion.   Neurological: She is alert and oriented to person, place, and time.   Skin: Skin is warm and dry. Capillary refill takes less than 2 seconds.   Psychiatric: She has a normal mood and affect.       Assessment:       1. Essential hypertension    2. Type 2 diabetes mellitus without complication, without long-term current use of insulin    3. Mixed hyperlipidemia    4. Migraine without aura and without status migrainosus, not intractable        Plan:       Essential hypertension  -     Hydralazine 50mg PO TID started today and Imdur d/c   -     NIFEdipine (PROCARDIA-XL) 60 MG (OSM) 24 hr tablet; Take 1 tablet (60 mg total) by mouth once daily.  Dispense: 30 tablet; Refill: 11     Type 2 diabetes mellitus without complication, without long-term current use of insulin          -    A1c 6.7           -    Diet and lifestyle changes discussed           -   Patient to continue current medication regimen.      Mixed hyperlipidemia            -   Will continue Simvastatin 40mg PO daily     Follow up in about 1 week (around 11/20/2019) for HTN mgmt .     Reynaldo Cobos MD   [facility], PGY-2

## 2019-11-15 ENCOUNTER — TELEPHONE (OUTPATIENT)
Dept: FAMILY MEDICINE | Facility: HOSPITAL | Age: 54
End: 2019-11-15

## 2019-11-15 NOTE — TELEPHONE ENCOUNTER
----- Message from Liliane Coronado MA sent at 11/15/2019 12:39 PM CST -----  Patient requests return call to number above. Says NIFEdipine (PROCARDIA-XL) 60 MG (OSM) 24 hr tablet is making her nauseous, dizzy, weak and she has a rapid heart beat.  Please call patient.  Thanks.

## 2019-11-15 NOTE — TELEPHONE ENCOUNTER
Called Patient. Instructed her to take imdur in place of the nifedipine (stop nifedipine) tomorrow morning and continue hydralazine. Patient states she is having good BP control, however side effects are not tolerable. Patient agrees with plan.

## 2019-11-20 ENCOUNTER — OFFICE VISIT (OUTPATIENT)
Dept: FAMILY MEDICINE | Facility: HOSPITAL | Age: 54
End: 2019-11-20
Attending: SPECIALIST
Payer: MEDICAID

## 2019-11-20 VITALS
SYSTOLIC BLOOD PRESSURE: 149 MMHG | HEART RATE: 108 BPM | BODY MASS INDEX: 25.07 KG/M2 | DIASTOLIC BLOOD PRESSURE: 72 MMHG | HEIGHT: 62 IN | WEIGHT: 136.25 LBS

## 2019-11-20 DIAGNOSIS — I10 ESSENTIAL HYPERTENSION: Primary | ICD-10-CM

## 2019-11-20 PROCEDURE — 99213 OFFICE O/P EST LOW 20 MIN: CPT | Performed by: STUDENT IN AN ORGANIZED HEALTH CARE EDUCATION/TRAINING PROGRAM

## 2019-11-20 RX ORDER — ISOSORBIDE MONONITRATE 60 MG/1
60 TABLET, EXTENDED RELEASE ORAL DAILY
COMMUNITY
End: 2019-12-12

## 2019-11-20 RX ORDER — METOPROLOL SUCCINATE 25 MG/1
25 TABLET, EXTENDED RELEASE ORAL DAILY
Qty: 30 TABLET | Refills: 11 | Status: SHIPPED | OUTPATIENT
Start: 2019-11-20 | End: 2020-02-20

## 2019-11-20 RX ORDER — ONDANSETRON 4 MG/1
4 TABLET, ORALLY DISINTEGRATING ORAL EVERY 8 HOURS PRN
Qty: 90 TABLET | Refills: 12 | Status: SHIPPED | OUTPATIENT
Start: 2019-11-20 | End: 2021-01-25 | Stop reason: SDUPTHER

## 2019-11-20 NOTE — PROGRESS NOTES
Subjective:       Patient ID: Aggie Rasheed is a 54 y.o. female.    Chief Complaint: HTN Management        55 y/o female with a pmhx of HTN, HLD, DM2, who presents to clinic today for follow up. Patient currently on Imdur and hydralazine for BP management. Patient states that she has been compliant with this regimen. Patient endorses diarrhea as side effect from imdur with intermittent nausea. Patient states that her blood pressures have been well in range, patient was previously taking propanolol and is agreeable to switch to metoprolol today. Patient states that she understands that keeping her BP control is essential and side effects can be managed with supportive therapy. Patient in agreement with plan during interview.     Review of Systems   Constitutional: Negative for activity change and appetite change.   HENT: Negative for trouble swallowing.    Eyes: Negative for visual disturbance.   Respiratory: Negative for shortness of breath.    Cardiovascular: Negative for chest pain and palpitations.   Gastrointestinal: Positive for diarrhea and nausea. Negative for abdominal distention and vomiting.   Endocrine: Negative for cold intolerance and heat intolerance.   Genitourinary: Negative for flank pain.   Musculoskeletal: Negative for arthralgias.   Skin: Negative for color change.   Allergic/Immunologic: Negative for immunocompromised state.   Neurological: Negative for headaches.   Hematological: Negative for adenopathy.   Psychiatric/Behavioral: Negative for agitation.       Objective:      Vitals:    11/20/19 1502   BP: (!) 149/72   Pulse: 108     Physical Exam   Constitutional: She is oriented to person, place, and time. She appears well-developed and well-nourished.   HENT:   Head: Normocephalic and atraumatic.   Eyes: Pupils are equal, round, and reactive to light. EOM are normal.   Neck: Normal range of motion. Neck supple.   Cardiovascular: Normal rate, regular rhythm, normal heart sounds and intact  distal pulses. Exam reveals no gallop and no friction rub.   No murmur heard.  Pulmonary/Chest: Effort normal and breath sounds normal. She has no wheezes. She has no rales.   Abdominal: Soft. Bowel sounds are normal. She exhibits no distension. There is no tenderness.   Musculoskeletal: Normal range of motion.   Neurological: She is alert and oriented to person, place, and time.   Skin: Skin is warm and dry. Capillary refill takes less than 2 seconds.   Psychiatric: She has a normal mood and affect.       Assessment:       1. Essential hypertension        Plan:       Essential HTN   - Patient currently on Imdur 60mg XR Po daily  and hydralizine 50mg PO TID  - D/c Propanolol today   - Start Metropolol 25mg XL today   - Measure BP at home if concern for hypotension and hold BP meds as instructed during interview   - Patient instructed to call clinic if any side effects present     Follow up in about 1 month (around 12/20/2019).     Reynaldo Cobos MD   [facility], PGY-2

## 2019-12-12 ENCOUNTER — OFFICE VISIT (OUTPATIENT)
Dept: FAMILY MEDICINE | Facility: HOSPITAL | Age: 54
End: 2019-12-12
Attending: FAMILY MEDICINE
Payer: MEDICAID

## 2019-12-12 VITALS
BODY MASS INDEX: 24.26 KG/M2 | SYSTOLIC BLOOD PRESSURE: 123 MMHG | HEIGHT: 62 IN | DIASTOLIC BLOOD PRESSURE: 69 MMHG | HEART RATE: 107 BPM | WEIGHT: 131.81 LBS

## 2019-12-12 DIAGNOSIS — E11.9 TYPE 2 DIABETES MELLITUS WITHOUT COMPLICATION, WITHOUT LONG-TERM CURRENT USE OF INSULIN: ICD-10-CM

## 2019-12-12 DIAGNOSIS — I10 ESSENTIAL HYPERTENSION: ICD-10-CM

## 2019-12-12 DIAGNOSIS — E78.1 HYPERTRIGLYCERIDEMIA: ICD-10-CM

## 2019-12-12 DIAGNOSIS — H65.02 NON-RECURRENT ACUTE SEROUS OTITIS MEDIA OF LEFT EAR: Primary | ICD-10-CM

## 2019-12-12 PROCEDURE — 99213 OFFICE O/P EST LOW 20 MIN: CPT | Performed by: STUDENT IN AN ORGANIZED HEALTH CARE EDUCATION/TRAINING PROGRAM

## 2019-12-12 RX ORDER — ATORVASTATIN CALCIUM 40 MG/1
40 TABLET, FILM COATED ORAL DAILY
Qty: 90 TABLET | Refills: 3 | Status: SHIPPED | OUTPATIENT
Start: 2019-12-12 | End: 2020-10-14 | Stop reason: SDUPTHER

## 2019-12-12 RX ORDER — NIFEDIPINE 60 MG/1
60 TABLET, EXTENDED RELEASE ORAL DAILY
Qty: 30 TABLET | Refills: 11 | Status: SHIPPED | OUTPATIENT
Start: 2019-12-12 | End: 2020-04-02

## 2019-12-12 RX ORDER — AMOXICILLIN AND CLAVULANATE POTASSIUM 875; 125 MG/1; MG/1
1 TABLET, FILM COATED ORAL EVERY 12 HOURS
Qty: 14 TABLET | Refills: 0 | Status: SHIPPED | OUTPATIENT
Start: 2019-12-12 | End: 2019-12-19

## 2019-12-12 RX ORDER — PROMETHAZINE HYDROCHLORIDE 12.5 MG/1
12.5 TABLET ORAL EVERY 6 HOURS PRN
Qty: 28 TABLET | Refills: 0 | Status: SHIPPED | OUTPATIENT
Start: 2019-12-12 | End: 2019-12-19

## 2019-12-12 NOTE — PROGRESS NOTES
Subjective:       Patient ID: Aggie Rasheed is a 54 y.o. female.    Chief Complaint: Ear Pain     HPI   55 y/o f with a pmhx of DM2, Hypertriglyceridemia and HTN, who presents to clinic today for HTN management. Patient continues to endorse side effects of her medication despite changing her regimen multiple times. Patient states that the side effect is likely the imdur. Last dose take earlier this morning and patient understands that nifedipine if restarted cannot be restarted until tomorrow morning. Patient tolerating other medications well. Patient agrees that imdur is the cause of her side effects and will stop med. Patient also endorses sinus congestion and cough starting 2 weeks ago that progressively worsened. Patient also admits to scratching her ear canal with her finger as well. All questions answered and benefits and risks of medication changes discussed. Patient understands that starting BP meds while being dehydrated can be dangerous and to monitor BP at home closely before taking medications.     Review of Systems   Constitutional: Negative for activity change and appetite change.   HENT: Positive for congestion, ear discharge and ear pain. Negative for trouble swallowing.    Eyes: Negative for visual disturbance.   Respiratory: Negative for shortness of breath.    Cardiovascular: Negative for chest pain and palpitations.   Gastrointestinal: Negative for abdominal distention, diarrhea, nausea and vomiting.   Endocrine: Negative for cold intolerance and heat intolerance.   Genitourinary: Negative for flank pain.   Musculoskeletal: Negative for arthralgias.   Skin: Negative for color change.   Allergic/Immunologic: Negative for immunocompromised state.   Neurological: Negative for headaches.   Hematological: Negative for adenopathy.   Psychiatric/Behavioral: Negative for agitation.       Objective:      Vitals:    12/12/19 0902   BP: 123/69   Pulse: 107        Physical Exam   Constitutional: She is  oriented to person, place, and time. She appears well-developed and well-nourished.   HENT:   Head: Normocephalic and atraumatic.   Bulging tympanic membrane on left with ulcerations in ear canal. Some notable erythema.    Eyes: Pupils are equal, round, and reactive to light. EOM are normal.   Neck: Normal range of motion. Neck supple.   Cardiovascular: Normal rate, regular rhythm, normal heart sounds and intact distal pulses. Exam reveals no gallop and no friction rub.   No murmur heard.  Pulmonary/Chest: Effort normal and breath sounds normal. She has no wheezes. She has no rales.   Abdominal: Soft. Bowel sounds are normal. She exhibits no distension. There is no tenderness.   Musculoskeletal: Normal range of motion.   Neurological: She is alert and oriented to person, place, and time.   Skin: Skin is warm and dry. Capillary refill takes less than 2 seconds.   Psychiatric: She has a normal mood and affect.       Assessment:       1. Non-recurrent acute serous otitis media of left ear    2. Essential hypertension    3. Type 2 diabetes mellitus without complication, without long-term current use of insulin    4. Hypertriglyceridemia        Plan:       Non-recurrent acute serous otitis media of left ear  -     amoxicillin-clavulanate 875-125mg (AUGMENTIN) 875-125 mg per tablet; Take 1 tablet by mouth every 12 (twelve) hours. for 7 days  Dispense: 14 tablet; Refill: 0  -     promethazine (PHENERGAN) 12.5 MG Tab; Take 1 tablet (12.5 mg total) by mouth every 6 (six) hours as needed (cough).  Dispense: 28 tablet; Refill: 0. Patient reports to that she has had rash with codeine, however has taken mucinex DM without any side effects. Endorses that she would like to try this med and will take responsibility for any side effects that do occur.     Essential hypertension        -     Stop Imdur         -     Continue Hydralizine 50mg PO TID         -     Continue Losartan 100mg PO daily         -     Continue Metoprolol 25mg  XR daily   -     NIFEdipine (PROCARDIA-XL) 60 MG (OSM) 24 hr tablet; Take 1 tablet (60 mg total) by mouth once daily.  Dispense: 30 tablet; Refill: 11  -      Patient instructed to hold medications if side effects or if blood pressure is low which is defined as less than 120/80 and seek treatment in the ER for uncontrolled HTN if occurs. Patient agrees with plan. Patient instructed to comply with directions and being non-compliant may result in morbidity. Patient understands the risks.     Type 2 diabetes mellitus without complication, without long-term current use of insulin         -  Continue Metformin 1000mg PO BID          -  Continue Dietary and lifestyle changes     Hypertriglyceridemia  -     atorvastatin (LIPITOR) 40 MG tablet; Take 1 tablet (40 mg total) by mouth once daily.  Dispense: 90 tablet; Refill: 3      Follow up in about 1 month (around 1/12/2020).     Reynaldo Cobos MD   [facility], PGY-2

## 2019-12-18 NOTE — PROGRESS NOTES
I have reviewed the notes, assessments, and/or procedures performed, I concur with her/his documentation of Aggie Rasheed.

## 2020-02-17 DIAGNOSIS — M17.0 OSTEOARTHRITIS OF KNEES, BILATERAL: Primary | ICD-10-CM

## 2020-02-20 ENCOUNTER — OFFICE VISIT (OUTPATIENT)
Dept: FAMILY MEDICINE | Facility: HOSPITAL | Age: 55
End: 2020-02-20
Attending: FAMILY MEDICINE
Payer: MEDICAID

## 2020-02-20 VITALS
BODY MASS INDEX: 23.32 KG/M2 | HEIGHT: 62 IN | DIASTOLIC BLOOD PRESSURE: 61 MMHG | WEIGHT: 126.75 LBS | SYSTOLIC BLOOD PRESSURE: 125 MMHG | HEART RATE: 83 BPM

## 2020-02-20 DIAGNOSIS — I10 ESSENTIAL HYPERTENSION: Primary | ICD-10-CM

## 2020-02-20 DIAGNOSIS — M17.0 PRIMARY OSTEOARTHRITIS OF BOTH KNEES: ICD-10-CM

## 2020-02-20 PROCEDURE — 99213 OFFICE O/P EST LOW 20 MIN: CPT | Performed by: STUDENT IN AN ORGANIZED HEALTH CARE EDUCATION/TRAINING PROGRAM

## 2020-02-20 RX ORDER — PROPRANOLOL HYDROCHLORIDE 10 MG/1
10 TABLET ORAL 3 TIMES DAILY
Qty: 90 TABLET | Refills: 11 | Status: SHIPPED | OUTPATIENT
Start: 2020-02-20 | End: 2020-04-02

## 2020-02-20 NOTE — PROGRESS NOTES
Subjective:       Patient ID: Aggie Rasheed is a 54 y.o. female.    Chief Complaint: HTN Management      HPI   55 y/o f with a pmhx of DM2, Hypertriglyceridemia and HTN, who presents to clinic today for HTN management.  Patient states that she has been tolerating her current regimen well however has been endorsing headaches and uncontrolled heart rate with metoprolol.  Patient states that her diarrhea that she previously endorsed has since been controlled and has infrequently used Imodium to treated it.  Patient states that she would like to discontinue her metoprolol and resume her propanolol that she was taking previously.  Patient also endorses bilateral knee pain that has been ongoing.  Patient notes grinding of her knees when she ambulates in addition to pain.  Patient states that she has tried anti-inflammatory medications with minimal relief and states that she would like to try injections of her knees to help relieve her pain.  Patient counseled on the benefits and risks of the procedure including elevation in blood pressure and blood sugar, and risk of infection.  Patient states that she would like to proceed.       Review of Systems   Constitutional: Negative for activity change and appetite change.   HENT: Negative for trouble swallowing.    Eyes: Negative for visual disturbance.   Respiratory: Negative for shortness of breath.    Cardiovascular: Negative for chest pain and palpitations.   Gastrointestinal: Negative for abdominal distention, diarrhea, nausea and vomiting.   Endocrine: Negative for cold intolerance and heat intolerance.   Genitourinary: Negative for flank pain.   Musculoskeletal: Negative for arthralgias.        Bilateral Knee Pain    Skin: Negative for color change.   Allergic/Immunologic: Negative for immunocompromised state.   Neurological: Negative for headaches.   Hematological: Negative for adenopathy.   Psychiatric/Behavioral: Negative for agitation.       Objective:      Vitals:     02/20/20 1557   BP: 125/61   Pulse: 83     Physical Exam   Constitutional: She is oriented to person, place, and time. She appears well-developed and well-nourished.   HENT:   Head: Normocephalic and atraumatic.   Eyes: Pupils are equal, round, and reactive to light. EOM are normal.   Neck: Normal range of motion. Neck supple.   Cardiovascular: Normal rate, regular rhythm, normal heart sounds and intact distal pulses. Exam reveals no gallop and no friction rub.   No murmur heard.  Pulmonary/Chest: Effort normal and breath sounds normal. She has no wheezes. She has no rales.   Abdominal: Soft. Bowel sounds are normal. She exhibits no distension. There is no tenderness.   Musculoskeletal: Normal range of motion. She exhibits tenderness.   TTP along the joint line to knees bilateral.  No obvious deformity upon inspection.  Some edema noted at the right knee however no edema at the left.    Neurological: She is alert and oriented to person, place, and time.   Skin: Skin is warm and dry. Capillary refill takes less than 2 seconds.   Psychiatric: She has a normal mood and affect.         Bilateral Intra-articular Knee  Injection Procedure Note    Pre-operative Diagnosis: bilateral Primary Osteoarthritis of the Knee     Post-operative Diagnosis: same    Indications: Pain     Procedure Details     Written and Verbal consent was obtained for the procedure.  Both knees were marked at the lateral joint space prior to application of alcohol swab and Betadine for sterilization. Ethyl chloride spray then used for topical anesthesia.  Two 25 gauge inch and a half needles and syringes were loaded with 4ml 1% lidocaine and 5ml of 0.45% bupivacaine and depo-medrol 40mg/1ml.  Each needle was inserted into the lateral joint space of each knee in normal fashion and entire 10ml volume inserted into the joint space without any resistance. The needle was removed and the area cleansed and dressed.    Complications:  None; patient  tolerated the procedure well.    Assessment:       1. Essential hypertension    2. Primary osteoarthritis of both knees        Plan:       Essential hypertension        -     Continue Hydralizine 50mg PO TID         -     Continue Losartan 100mg PO daily         -     Stop Metoprolol 25mg XR PO daily   -     Continue NIFEdipine (PROCARDIA-XL) 60 MG PO daily            -   Start  propranolol (INDERAL) 10 MG tablet; Take 1 tablet (10 mg total) by mouth 3 (three) times daily.  Dispense: 90 tablet; Refill: 11  -      Patient instructed to hold medications if side effects or if blood pressure is low which is defined as less than 120/80 and seek treatment in the ER for uncontrolled HTN if occurs. Patient agrees with plan. Patient instructed to comply with directions and being non-compliant may result in morbidity. Patient understands the risks.     Patient states that she will bring recorded blood pressure and heart rate values from home prior to appointment in 1 week.  Patient will require up titration of her propanolol if heart rate remains uncontrolled.      Primary osteoarthritis of both knees        - bilateral intra-articular knee injections performed today.  Patient tolerated the procedure well.  No complications.      Follow up in about 1 week (around 2/27/2020).     Reynaldo Cobos MD   [facility], PGY-2

## 2020-03-03 NOTE — PROGRESS NOTES
I assume primary medical responsibility for this patient. I have reviewed the history, physical, and assessement & treatment plan with the resident and agree that the care is reasonable and necessary. This service has been performed by a resident without the presence of a teaching physician under the primary care exception. If necessary, an addendum of additional findings or evaluation beyond the resident documentation will be noted below.    Kimberlee Turner MD

## 2020-04-02 ENCOUNTER — E-VISIT (OUTPATIENT)
Dept: FAMILY MEDICINE | Facility: HOSPITAL | Age: 55
End: 2020-04-02

## 2020-04-02 RX ORDER — METOPROLOL SUCCINATE 50 MG/1
50 TABLET, EXTENDED RELEASE ORAL DAILY
Qty: 30 TABLET | Refills: 1 | Status: SHIPPED | OUTPATIENT
Start: 2020-04-02 | End: 2020-06-12 | Stop reason: SDUPTHER

## 2020-04-02 RX ORDER — NIFEDIPINE 30 MG/1
30 TABLET, EXTENDED RELEASE ORAL DAILY
Qty: 30 TABLET | Refills: 1 | Status: SHIPPED | OUTPATIENT
Start: 2020-04-02 | End: 2020-09-03

## 2020-04-02 RX ORDER — GABAPENTIN 600 MG/1
600 TABLET ORAL 3 TIMES DAILY
Qty: 90 TABLET | Refills: 11 | Status: SHIPPED | OUTPATIENT
Start: 2020-04-02 | End: 2020-04-03 | Stop reason: SDUPTHER

## 2020-04-02 RX ORDER — GABAPENTIN 600 MG/1
600 TABLET ORAL 3 TIMES DAILY
Qty: 90 TABLET | Refills: 11 | OUTPATIENT
Start: 2020-04-02 | End: 2021-04-02

## 2020-04-02 NOTE — PROGRESS NOTES
Subjective:       Patient ID: Aggie Rasheed is a 54 y.o. female.    Chief Complaint: No chief complaint on file.    HPI   This 54-year-old female past medical history of hypertension, protein C deficiency, lupus, who presents to clinic for telemedicine visit.  Patient states that her heart rate has been uncontrolled with a panel by stating that it was a preferred  medicine in the past.  Patient states that she was taking metoprolol 25 mg p.o. extended release without discussing with provider and stating that her heart rate still remains uncontrolled.  Patient also has been having uncontrolled diarrhea the last 2 months that has been watery and nonbloody and resolved with Imodium.  Patient denies any lightheadedness, chest pain or shortness of breath.  Patient states that her blood pressure has been well controlled on current regimen.     Review of Systems   Constitutional: Negative for activity change and appetite change.   HENT: Negative for trouble swallowing.    Eyes: Negative for visual disturbance.   Respiratory: Negative for shortness of breath.    Cardiovascular: Positive for palpitations. Negative for chest pain.   Gastrointestinal: Negative for abdominal distention, diarrhea, nausea and vomiting.   Endocrine: Negative for cold intolerance and heat intolerance.   Genitourinary: Negative for flank pain.   Musculoskeletal: Negative for arthralgias.   Skin: Negative for color change.   Allergic/Immunologic: Negative for immunocompromised state.   Neurological: Negative for headaches.   Hematological: Negative for adenopathy.   Psychiatric/Behavioral: Negative for agitation.       Objective:     Limited objective findings excluding vitals and limited physical examination  Physical Exam    Patient in no acute distress.  Able to talk in full sentences.  Alert and oriented person, place, time and situation. No. depressed mood or other mood disturbances.    Assessment:       1. Essential hypertension    2. Watery  diarrhea        Plan:       Essential hypertension   Continue losartan 100 mg p.o. Daily and hydralazine 50 mg t.i.d. Po  Start nifedipine 30 mg p.o. daily and metoprolol 50 mg p.o. daily    Watery diarrhea  Chronic   Likely medication side effect.  However, cannot rule out infectious cause until stool studies completed.  Patient is afebrile.   Stool studies to be completed upon return to clinic    Follow up in 1 week     Reynaldo Cobos MD   [facility], PGY-2

## 2020-04-03 ENCOUNTER — TELEPHONE (OUTPATIENT)
Dept: FAMILY MEDICINE | Facility: HOSPITAL | Age: 55
End: 2020-04-03

## 2020-04-03 RX ORDER — GABAPENTIN 600 MG/1
1200 TABLET ORAL 3 TIMES DAILY
Qty: 180 TABLET | Refills: 11 | Status: SHIPPED | OUTPATIENT
Start: 2020-04-03 | End: 2021-03-11 | Stop reason: SDUPTHER

## 2020-04-03 NOTE — TELEPHONE ENCOUNTER
I assume primary medical responsibility for this patient. I have reviewed the history, physical, and assessement & treatment plan with the resident and agree that the care is reasonable and necessary. This service has been performed by a resident via audio-only telemedicine with immediate supervision available by attending. If necessary, an addendum of additional findings or evaluation beyond the resident documentation will be noted below.    Working on balancing pt diarrhea w/ BP mgmt    Kimberlee Turner MD

## 2020-04-03 NOTE — TELEPHONE ENCOUNTER
Patient states she is supposed to take gabapentin 600mg :Take two tablets(1,200mg) TID.   Sent in was gabapentin 600mg: Take 1 tablet TID.

## 2020-04-03 NOTE — TELEPHONE ENCOUNTER
----- Message from Liliane Coronado MA sent at 4/3/2020 10:46 AM CDT -----  Contact: Patient [phone]  Patient states she spoke with you yesterday.  Gabapentin was written for once daily instead of two, and nifedipine was supposed to be sent for 30mg but that was not sent.  Please call patient to advise.  Thanks.

## 2020-04-09 ENCOUNTER — E-VISIT (OUTPATIENT)
Dept: FAMILY MEDICINE | Facility: HOSPITAL | Age: 55
End: 2020-04-09

## 2020-04-09 NOTE — PROGRESS NOTES
Subjective:       Patient ID: Aggie Rasheed is a 54 y.o. female.    Chief Complaint: HTN Management     HPI     This 54-year-old female past medical history of hypertension, protein C deficiency, lupus, who presents to clinic for telemedicine visit.  Patient reports that she has tolerated Metoprolol increase well since last visit.  Patient states that she continues to have chronic watery diarrhea however has been tolerable.  Blood pressure has been in 120/80s and HR 80s on current regimen.  Patient otherwise doing well and no other complaints today.    Review of Systems   Constitutional: Negative for activity change and appetite change.   HENT: Negative for trouble swallowing.    Eyes: Negative for visual disturbance.   Respiratory: Negative for shortness of breath.    Cardiovascular: Negative for chest pain and palpitations.   Gastrointestinal: Negative for abdominal distention, diarrhea, nausea and vomiting.   Endocrine: Negative for cold intolerance and heat intolerance.   Genitourinary: Negative for flank pain.   Musculoskeletal: Negative for arthralgias.   Skin: Negative for color change.   Allergic/Immunologic: Negative for immunocompromised state.   Neurological: Negative for headaches.   Hematological: Negative for adenopathy.   Psychiatric/Behavioral: Negative for agitation.       Objective:      There were no vitals filed for this visit.     Physical Exam    No acute distress.  Alert and oriented x4.  Patient able to talk in full sentences without difficulty.  Mood stable.  Assessment:       1. Essential hypertension        Plan:       Essential hypertension  Continue losartan 100 mg p.o. Daily, hydralazine 50 mg t.i.d. Po, nifedipine 60 mg p.o. daily and metoprolol 50 mg p.o. daily    Return to clinic in 2 months for follow-up    The patient location is: home  The chief complaint leading to consultation is: HTN Management   Visit type: Virtual visit with synchronous audio  Total time spent with  patient: 5  Each patient to whom he or she provides medical services by telemedicine is:  (1) informed of the relationship between the physician and patient and the respective role of any other health care provider with respect to management of the patient; and (2) notified that he or she may decline to receive medical services by telemedicine and may withdraw from such care at any time.    Reynaldo Cobos MD   [facility], PGY-2

## 2020-04-15 NOTE — TELEPHONE ENCOUNTER
I assume primary medical responsibility for this patient. I have reviewed the history, physical, and assessement & treatment plan with the resident and agree that the care is reasonable and necessary. This service has been performed by a resident via audio-only telemedicine with immediate supervision available by attending. If necessary, an addendum of additional findings or evaluation beyond the resident documentation will be noted below.    Kimberlee Turner MD

## 2020-04-17 ENCOUNTER — TELEPHONE (OUTPATIENT)
Dept: FAMILY MEDICINE | Facility: HOSPITAL | Age: 55
End: 2020-04-17

## 2020-04-17 NOTE — TELEPHONE ENCOUNTER
Pharmacy faxed over notification that gabapentin 600mg , Take 2 tablets by mouth three times daily is not covered by insurance.

## 2020-04-20 ENCOUNTER — TELEPHONE (OUTPATIENT)
Dept: FAMILY MEDICINE | Facility: HOSPITAL | Age: 55
End: 2020-04-20

## 2020-06-08 DIAGNOSIS — E11.9 TYPE 2 DIABETES MELLITUS WITHOUT OPHTHALMIC MANIFESTATIONS: ICD-10-CM

## 2020-06-09 RX ORDER — METFORMIN HYDROCHLORIDE 1000 MG/1
1000 TABLET ORAL 2 TIMES DAILY
Qty: 90 TABLET | Refills: 3 | Status: SHIPPED | OUTPATIENT
Start: 2020-06-09 | End: 2020-09-14

## 2020-06-12 RX ORDER — METOPROLOL SUCCINATE 50 MG/1
50 TABLET, EXTENDED RELEASE ORAL DAILY
Qty: 30 TABLET | Refills: 1 | Status: SHIPPED | OUTPATIENT
Start: 2020-06-12 | End: 2020-08-10 | Stop reason: SDUPTHER

## 2020-08-10 NOTE — TELEPHONE ENCOUNTER
Called patient to reschedule 08/12/2020,rescheduled 09/03/2020. Patient ask for refill of metoprolol 50mg. Messages sent to Dr. Cobos for such.

## 2020-08-11 RX ORDER — METOPROLOL SUCCINATE 50 MG/1
50 TABLET, EXTENDED RELEASE ORAL DAILY
Qty: 30 TABLET | Refills: 1 | Status: SHIPPED | OUTPATIENT
Start: 2020-08-11 | End: 2020-10-14 | Stop reason: SDUPTHER

## 2020-09-03 ENCOUNTER — HOSPITAL ENCOUNTER (EMERGENCY)
Facility: HOSPITAL | Age: 55
Discharge: HOME | End: 2020-09-03
Attending: EMERGENCY MEDICINE
Payer: MEDICAID

## 2020-09-03 ENCOUNTER — OFFICE VISIT (OUTPATIENT)
Dept: FAMILY MEDICINE | Facility: HOSPITAL | Age: 55
End: 2020-09-03
Attending: FAMILY MEDICINE
Payer: MEDICAID

## 2020-09-03 VITALS
DIASTOLIC BLOOD PRESSURE: 54 MMHG | HEIGHT: 62 IN | SYSTOLIC BLOOD PRESSURE: 112 MMHG | HEART RATE: 90 BPM | BODY MASS INDEX: 20.73 KG/M2 | WEIGHT: 112.63 LBS

## 2020-09-03 VITALS
TEMPERATURE: 98 F | DIASTOLIC BLOOD PRESSURE: 81 MMHG | WEIGHT: 112 LBS | HEART RATE: 88 BPM | BODY MASS INDEX: 20.61 KG/M2 | SYSTOLIC BLOOD PRESSURE: 176 MMHG | RESPIRATION RATE: 20 BRPM | OXYGEN SATURATION: 98 % | HEIGHT: 62 IN

## 2020-09-03 DIAGNOSIS — N30.00 ACUTE CYSTITIS WITHOUT HEMATURIA: ICD-10-CM

## 2020-09-03 DIAGNOSIS — R11.2 NAUSEA VOMITING AND DIARRHEA: Primary | ICD-10-CM

## 2020-09-03 DIAGNOSIS — R10.84 GENERALIZED ABDOMINAL PAIN: ICD-10-CM

## 2020-09-03 DIAGNOSIS — N30.01 ACUTE CYSTITIS WITH HEMATURIA: ICD-10-CM

## 2020-09-03 DIAGNOSIS — M32.9 SYSTEMIC LUPUS ERYTHEMATOSUS, UNSPECIFIED SLE TYPE, UNSPECIFIED ORGAN INVOLVEMENT STATUS: ICD-10-CM

## 2020-09-03 DIAGNOSIS — E86.0 DEHYDRATION: Primary | ICD-10-CM

## 2020-09-03 DIAGNOSIS — R19.7 NAUSEA VOMITING AND DIARRHEA: Primary | ICD-10-CM

## 2020-09-03 LAB
ALBUMIN SERPL BCP-MCNC: 4 G/DL (ref 3.5–5.2)
ALP SERPL-CCNC: 77 U/L (ref 55–135)
ALT SERPL W/O P-5'-P-CCNC: 14 U/L (ref 10–44)
ANION GAP SERPL CALC-SCNC: 11 MMOL/L (ref 8–16)
AST SERPL-CCNC: 15 U/L (ref 10–40)
BACTERIA #/AREA URNS HPF: ABNORMAL /HPF
BASOPHILS # BLD AUTO: 0.04 K/UL (ref 0–0.2)
BASOPHILS NFR BLD: 0.7 % (ref 0–1.9)
BILIRUB SERPL-MCNC: 0.3 MG/DL (ref 0.1–1)
BILIRUB UR QL STRIP: NEGATIVE
BUN SERPL-MCNC: 28 MG/DL (ref 6–20)
CALCIUM SERPL-MCNC: 9.6 MG/DL (ref 8.7–10.5)
CHLORIDE SERPL-SCNC: 115 MMOL/L (ref 95–110)
CLARITY UR: CLEAR
CO2 SERPL-SCNC: 17 MMOL/L (ref 23–29)
COLOR UR: YELLOW
CREAT SERPL-MCNC: 1.2 MG/DL (ref 0.5–1.4)
DIFFERENTIAL METHOD: ABNORMAL
EOSINOPHIL # BLD AUTO: 0.1 K/UL (ref 0–0.5)
EOSINOPHIL NFR BLD: 1.3 % (ref 0–8)
ERYTHROCYTE [DISTWIDTH] IN BLOOD BY AUTOMATED COUNT: 13.6 % (ref 11.5–14.5)
EST. GFR  (AFRICAN AMERICAN): 58 ML/MIN/1.73 M^2
EST. GFR  (NON AFRICAN AMERICAN): 50 ML/MIN/1.73 M^2
GLUCOSE SERPL-MCNC: 87 MG/DL (ref 70–110)
GLUCOSE UR QL STRIP: NEGATIVE
HCT VFR BLD AUTO: 32.8 % (ref 37–48.5)
HGB BLD-MCNC: 10.4 G/DL (ref 12–16)
HGB UR QL STRIP: NEGATIVE
IMM GRANULOCYTES # BLD AUTO: 0.02 K/UL (ref 0–0.04)
IMM GRANULOCYTES NFR BLD AUTO: 0.3 % (ref 0–0.5)
KETONES UR QL STRIP: NEGATIVE
LEUKOCYTE ESTERASE UR QL STRIP: ABNORMAL
LYMPHOCYTES # BLD AUTO: 1.4 K/UL (ref 1–4.8)
LYMPHOCYTES NFR BLD: 23.4 % (ref 18–48)
MAGNESIUM SERPL-MCNC: 0.9 MG/DL (ref 1.6–2.6)
MCH RBC QN AUTO: 28.4 PG (ref 27–31)
MCHC RBC AUTO-ENTMCNC: 31.7 G/DL (ref 32–36)
MCV RBC AUTO: 90 FL (ref 82–98)
MICROSCOPIC COMMENT: ABNORMAL
MONOCYTES # BLD AUTO: 0.4 K/UL (ref 0.3–1)
MONOCYTES NFR BLD: 6.4 % (ref 4–15)
NEUTROPHILS # BLD AUTO: 4.1 K/UL (ref 1.8–7.7)
NEUTROPHILS NFR BLD: 67.9 % (ref 38–73)
NITRITE UR QL STRIP: POSITIVE
NRBC BLD-RTO: 0 /100 WBC
PH UR STRIP: 6 [PH] (ref 5–8)
PLATELET # BLD AUTO: 115 K/UL (ref 150–350)
PMV BLD AUTO: 12.5 FL (ref 9.2–12.9)
POTASSIUM SERPL-SCNC: 4.1 MMOL/L (ref 3.5–5.1)
PROT SERPL-MCNC: 6.8 G/DL (ref 6–8.4)
PROT UR QL STRIP: NEGATIVE
RBC # BLD AUTO: 3.66 M/UL (ref 4–5.4)
RBC #/AREA URNS HPF: 1 /HPF (ref 0–4)
SODIUM SERPL-SCNC: 143 MMOL/L (ref 136–145)
SP GR UR STRIP: 1.02 (ref 1–1.03)
SQUAMOUS #/AREA URNS HPF: 2 /HPF
URN SPEC COLLECT METH UR: ABNORMAL
UROBILINOGEN UR STRIP-ACNC: NEGATIVE EU/DL
WBC # BLD AUTO: 6.1 K/UL (ref 3.9–12.7)
WBC #/AREA URNS HPF: 3 /HPF (ref 0–5)

## 2020-09-03 PROCEDURE — 80053 COMPREHEN METABOLIC PANEL: CPT

## 2020-09-03 PROCEDURE — 87186 SC STD MICRODIL/AGAR DIL: CPT

## 2020-09-03 PROCEDURE — 87088 URINE BACTERIA CULTURE: CPT

## 2020-09-03 PROCEDURE — 63600175 PHARM REV CODE 636 W HCPCS: Performed by: EMERGENCY MEDICINE

## 2020-09-03 PROCEDURE — 96365 THER/PROPH/DIAG IV INF INIT: CPT

## 2020-09-03 PROCEDURE — 81000 URINALYSIS NONAUTO W/SCOPE: CPT

## 2020-09-03 PROCEDURE — 83735 ASSAY OF MAGNESIUM: CPT

## 2020-09-03 PROCEDURE — 99214 OFFICE O/P EST MOD 30 MIN: CPT | Performed by: STUDENT IN AN ORGANIZED HEALTH CARE EDUCATION/TRAINING PROGRAM

## 2020-09-03 PROCEDURE — 85025 COMPLETE CBC W/AUTO DIFF WBC: CPT

## 2020-09-03 PROCEDURE — 99284 EMERGENCY DEPT VISIT MOD MDM: CPT | Mod: 25,27

## 2020-09-03 PROCEDURE — 96361 HYDRATE IV INFUSION ADD-ON: CPT

## 2020-09-03 PROCEDURE — 87077 CULTURE AEROBIC IDENTIFY: CPT

## 2020-09-03 PROCEDURE — 25000003 PHARM REV CODE 250: Performed by: PHYSICIAN ASSISTANT

## 2020-09-03 PROCEDURE — 96366 THER/PROPH/DIAG IV INF ADDON: CPT

## 2020-09-03 PROCEDURE — 87086 URINE CULTURE/COLONY COUNT: CPT

## 2020-09-03 RX ORDER — FERROUS SULFATE 325(65) MG
325 TABLET ORAL
Qty: 30 TABLET | Refills: 12 | Status: SHIPPED | OUTPATIENT
Start: 2020-09-04 | End: 2021-10-19

## 2020-09-03 RX ORDER — NITROFURANTOIN 25; 75 MG/1; MG/1
100 CAPSULE ORAL 2 TIMES DAILY
Qty: 10 CAPSULE | Refills: 0 | Status: SHIPPED | OUTPATIENT
Start: 2020-09-03 | End: 2020-09-08

## 2020-09-03 RX ORDER — CEPHALEXIN 500 MG/1
500 CAPSULE ORAL EVERY 12 HOURS
Qty: 14 CAPSULE | Refills: 0 | Status: SHIPPED | OUTPATIENT
Start: 2020-09-03 | End: 2020-09-10

## 2020-09-03 RX ORDER — METOCLOPRAMIDE 10 MG/1
10 TABLET ORAL EVERY 6 HOURS PRN
Qty: 14 TABLET | Refills: 0 | Status: SHIPPED | OUTPATIENT
Start: 2020-09-03 | End: 2020-10-14

## 2020-09-03 RX ORDER — MAGNESIUM SULFATE HEPTAHYDRATE 40 MG/ML
2 INJECTION, SOLUTION INTRAVENOUS
Status: COMPLETED | OUTPATIENT
Start: 2020-09-03 | End: 2020-09-03

## 2020-09-03 RX ORDER — DICYCLOMINE HYDROCHLORIDE 20 MG/1
20 TABLET ORAL 3 TIMES DAILY PRN
Qty: 14 TABLET | Refills: 0 | Status: SHIPPED | OUTPATIENT
Start: 2020-09-03 | End: 2020-10-03

## 2020-09-03 RX ADMIN — MAGNESIUM SULFATE HEPTAHYDRATE 2 G: 40 INJECTION, SOLUTION INTRAVENOUS at 09:09

## 2020-09-03 RX ADMIN — SODIUM CHLORIDE 1000 ML: 0.9 INJECTION, SOLUTION INTRAVENOUS at 07:09

## 2020-09-03 NOTE — FIRST PROVIDER EVALUATION
Emergency Department TeleTriage Encounter Note      CHIEF COMPLAINT    Chief Complaint   Patient presents with    Diarrhea     with n/v  x mths( started with med changes)  went to primary MD and sent to ED, decreased po intake, sent for Labs, pt also reports  loss     Dysuria     intermitent x mths        VITAL SIGNS   Initial Vitals [09/03/20 1747]   BP Pulse Resp Temp SpO2   (!) 124/59 91 19 98.1 °F (36.7 °C) 98 %      MAP       --            ALLERGIES    Review of patient's allergies indicates:   Allergen Reactions    Niacin preparations Other (See Comments)     Red inflammed eyes    Codeine Rash and Other (See Comments)       PROVIDER TRIAGE NOTE  Patient is a 55-year-old presenting to the ER for evaluation of diarrhea and dysuria.  She has had intermittent episodes of diarrhea throughout the last 1 month.  Patient was seen by primary care physician was concerned for dehydration therefore sent her to the emergency room.  She denies any fevers or chills.  No URI symptoms.  No blood in stool or black tarry stool.  Order was placed for IV access, fluids and lab work. Pending further evaluation and final disposition by ED provider.      ORDERS  Labs Reviewed   CBC W/ AUTO DIFFERENTIAL   COMPREHENSIVE METABOLIC PANEL       ED Orders (720h ago, onward)    Start Ordered     Status Ordering Provider    09/03/20 1830 09/03/20 1818  sodium chloride 0.9% bolus 1,000 mL  ED 1 Time      Ordered ALLY MORGAN    09/03/20 1819 09/03/20 1818  CBC auto differential  STAT      Ordered ALLY MORGAN    09/03/20 1819 09/03/20 1818  Comprehensive metabolic panel  STAT      Ordered ALLY MORGAN    09/03/20 1819 09/03/20 1818  Magnesium  STAT      Ordered ALLY MORGAN    09/03/20 1819 09/03/20 1818  Urinalysis, Reflex to Urine Culture Urine, Clean Catch  STAT      Ordered ALLY MORGAN    09/03/20 1819 09/03/20 1818  Insert Saline lock IV  Once      Ordered ALLY MORGAN            Virtual Visit  Note: The provider triage portion of this emergency department evaluation and documentation was performed via [employer], a HIPAA-compliant telemedicine application, in concert with a tele-presenter in the room. A face to face patient evaluation with one of my colleagues will occur once the patient is placed in an emergency department room.      DISCLAIMER: This note was prepared with [employer] voice recognition transcription software. Garbled syntax, mangled pronouns, and other bizarre constructions may be attributed to that software system.

## 2020-09-04 ENCOUNTER — PES CALL (OUTPATIENT)
Dept: ADMINISTRATIVE | Facility: CLINIC | Age: 55
End: 2020-09-04

## 2020-09-04 ENCOUNTER — TELEPHONE (OUTPATIENT)
Dept: FAMILY MEDICINE | Facility: HOSPITAL | Age: 55
End: 2020-09-04

## 2020-09-04 NOTE — TELEPHONE ENCOUNTER
----- Message from Liliane Coronado MA sent at 9/4/2020 10:57 AM CDT -----  Contact: Patient [phone]  Patient was seen yesterday and sent to er.  Wants to discuss medications; please call patient.  Thanks.

## 2020-09-04 NOTE — ED NOTES
"Recd report assumed care at this time. Pt lying on stretcher NAD. Pt states has had diarrhea for "months" due to a "new medicine" she was on,. States intermittent vomiting and abdominal pain. Denies abdominal pain at this time. Also states she has lost 20-30 pounds since these symptoms started. Reminded pt that we need a urine specimen when she can urinate.  "

## 2020-09-04 NOTE — PROGRESS NOTES
I assume primary medical responsibility for this patient. I have reviewed the history, physical, and assessement & treatment plan with the resident and agree that the care is reasonable and necessary. This service has been performed by a resident without the presence of a teaching physician under the primary care exception. If necessary, an addendum of additional findings or evaluation beyond the resident documentation will be noted below.    Unable to tolerate PO, concerning for uremia, urgent eval in ER    Kimberlee Turner MD

## 2020-09-04 NOTE — ED TRIAGE NOTES
Pt reports diarrhea x2-3 months and thinks it was due to a change in medication. PT states she intermittently has n/v as well. States her PCP states he is worried about her and wanted her to come and be checked and want to know how her kidney function is

## 2020-09-04 NOTE — ED NOTES
Pt resting in bed with eyes closed resp even unlabored. Mag rider infusing without difficulty, site clear. SR up Bed locked and low CB in reach.

## 2020-09-04 NOTE — ED PROVIDER NOTES
Encounter Date: 9/3/2020       History     Chief Complaint   Patient presents with    Diarrhea     with n/v  x mths( started with med changes)  went to primary MD and sent to ED, decreased po intake, sent for Labs, pt also reports  loss     Dysuria     intermitent x mths      55-year-old female sent to the emergency department by her primary care physician for evaluation nausea, vomiting, abdominal pain, diarrhea.  Also notes dysuria.  Onset of the symptoms is several months.  States she has been having multiple loose bowel movements per day for several months.  Notes intermittent generalized abdominal pain described as cramping.  Not currently experiencing any abdominal pain.  Notes nausea with 1 or 2 episodes of nonbloody, nonbilious emesis.  Currently reports improvement in her nausea.  Discussed with her primary care doctor, who is requesting lab evaluation given her chronic kidney disease.  Patient denies any fever.  No exacerbating alleviating factors reported.  Denies any headache, lightheadedness, dizziness, urgency, or frequency.  No other symptoms reported at this time.        Review of patient's allergies indicates:   Allergen Reactions    Niacin preparations Other (See Comments)     Red inflammed eyes    Codeine Rash and Other (See Comments)     Past Medical History:   Diagnosis Date    Diabetes mellitus type II     Headache associated with hormonal factors     Hyperlipidemia     Hypertension     Lupus     Protein C deficiency     Tachycardia      Past Surgical History:   Procedure Laterality Date    AORTIC VALVE SURGERY       SECTION      COLONOSCOPY N/A 2017    Procedure: COLONOSCOPY;  Surgeon: Markos Calvert MD;  Location: [facility];  Service: Endoscopy;  Laterality: N/A;    TOE AMPUTATION      TONSILLECTOMY      WRIST FRACTURE SURGERY       Family History   Problem Relation Age of Onset    Hypertension Mother     Cancer Father     Diabetes Maternal Uncle      Stroke Maternal Grandfather     Colon cancer Paternal Aunt      Social History     Tobacco Use    Smoking status: Current Every Day Smoker     Packs/day: 1.00     Types: Cigarettes    Smokeless tobacco: Never Used   Substance Use Topics    Alcohol use: No    Drug use: No     Review of Systems   Constitutional: Negative for chills, fatigue and fever.   HENT: Negative for congestion and sore throat.    Respiratory: Negative for cough and shortness of breath.    Cardiovascular: Negative for chest pain and palpitations.   Gastrointestinal: Positive for abdominal pain, diarrhea, nausea and vomiting.   Genitourinary: Positive for dysuria. Negative for frequency and urgency.   Musculoskeletal: Negative for back pain, neck pain and neck stiffness.   Neurological: Negative for light-headedness, numbness and headaches.       Physical Exam     Initial Vitals [09/03/20 1747]   BP Pulse Resp Temp SpO2   (!) 124/59 91 19 98.1 °F (36.7 °C) 98 %      MAP       --         Physical Exam    Nursing note and vitals reviewed.  Constitutional: She appears well-developed and well-nourished. No distress.   HENT:   Head: Normocephalic and atraumatic.   Eyes: Conjunctivae and EOM are normal. Pupils are equal, round, and reactive to light.   Neck: Normal range of motion. Neck supple. No tracheal deviation present.   Cardiovascular: Normal rate and intact distal pulses.   Pulmonary/Chest: No respiratory distress.   Abdominal: Soft. She exhibits no distension.   Musculoskeletal: Normal range of motion. No tenderness or edema.   Neurological: She is alert and oriented to person, place, and time. She has normal strength. No cranial nerve deficit. GCS score is 15. GCS eye subscore is 4. GCS verbal subscore is 5. GCS motor subscore is 6.   Skin: Skin is warm and dry.         ED Course   Procedures  Labs Reviewed   CBC W/ AUTO DIFFERENTIAL - Abnormal; Notable for the following components:       Result Value    RBC 3.66 (*)     Hemoglobin 10.4  (*)     Hematocrit 32.8 (*)     Mean Corpuscular Hemoglobin Conc 31.7 (*)     Platelets 115 (*)     All other components within normal limits   COMPREHENSIVE METABOLIC PANEL - Abnormal; Notable for the following components:    Chloride 115 (*)     CO2 17 (*)     BUN, Bld 28 (*)     eGFR if  58 (*)     eGFR if non  50 (*)     All other components within normal limits   MAGNESIUM - Abnormal; Notable for the following components:    Magnesium 0.9 (*)     All other components within normal limits    Narrative:     MG critical result(s) called and verbal readback obtained from Aby Mustafa RN by [facility] 09/03/2020 20:23   URINALYSIS, REFLEX TO URINE CULTURE - Abnormal; Notable for the following components:    Nitrite, UA Positive (*)     Leukocytes, UA Trace (*)     All other components within normal limits    Narrative:     Specimen Source->Urine   URINALYSIS MICROSCOPIC - Abnormal; Notable for the following components:    Bacteria Many (*)     All other components within normal limits    Narrative:     Specimen Source->Urine   CULTURE, URINE          Imaging Results    None          Medical Decision Making:   Initial Assessment:   55-year-old female presents to the emergency department for evaluation of persistent as diarrhea as well as nausea, vomiting, dysuria  Differential Diagnosis:   Diverticulitis, cholecystitis, pancreatitis, appendicitis, obstruction, constipation UTI, pyelonephritis, kidney stone, gastroenteritis, dehydration, electrolyte dyscrasias, arrhythmia  Clinical Tests:   Lab Tests: Reviewed       <> Summary of Lab: Hypomagnesemia, possible UTI  ED Management:  Patient given IV fluid and her potassium was repleted.  Vital signs remained stable.  Informed patient of results as well as plan to discharge with prescription for Reglan Keflex, instructions to increase oral hydration, prompt follow-up with primary care physician, reasons to return to the Emergency.                                  Clinical Impression:       ICD-10-CM ICD-9-CM   1. Nausea vomiting and diarrhea  R11.2 787.91    R19.7 787.01   2. Generalized abdominal pain  R10.84 789.07   3. Acute cystitis without hematuria  N30.00 595.0         Disposition:   Disposition: Discharged  Condition: Stable                        Patricio Gotti MD  09/03/20 4135

## 2020-09-06 LAB — BACTERIA UR CULT: ABNORMAL

## 2020-09-08 NOTE — TELEPHONE ENCOUNTER
Called patient to schedule sooner with Dr. Cobos. Patient is Not available on Thursday 09/10/202, she will keep her Monday appointment.

## 2020-09-14 ENCOUNTER — OFFICE VISIT (OUTPATIENT)
Dept: FAMILY MEDICINE | Facility: HOSPITAL | Age: 55
End: 2020-09-14
Attending: SPECIALIST
Payer: MEDICAID

## 2020-09-14 VITALS
HEIGHT: 62 IN | WEIGHT: 114.88 LBS | SYSTOLIC BLOOD PRESSURE: 138 MMHG | BODY MASS INDEX: 21.14 KG/M2 | HEART RATE: 86 BPM | DIASTOLIC BLOOD PRESSURE: 77 MMHG

## 2020-09-14 DIAGNOSIS — I10 ESSENTIAL HYPERTENSION: Primary | ICD-10-CM

## 2020-09-14 DIAGNOSIS — R19.7 WATERY DIARRHEA: ICD-10-CM

## 2020-09-14 DIAGNOSIS — D68.59 PROTEIN C DEFICIENCY: ICD-10-CM

## 2020-09-14 PROCEDURE — 99214 OFFICE O/P EST MOD 30 MIN: CPT | Performed by: STUDENT IN AN ORGANIZED HEALTH CARE EDUCATION/TRAINING PROGRAM

## 2020-09-14 NOTE — PROGRESS NOTES
Subjective:       Patient ID: Aggie Rasheed is a 55 y.o. female.    Chief Complaint: HTN Management     HPI   55-year-old female with a past medical history of hypertension, lupus, protein C deficiency on anticoagulation, who presents to clinic following recent urinary tract infection and ED visit for dehydration.  Patient reports following fluid bolus antibiotics that she feels better than she did previously.  Denies any urinary urgency, frequency or dysuria.  Patient reports that her watery diarrhea has persisted despite stopping her nifedipine which she believed to be the cause of for watery diarrhea.  Patient reports that she is currently compliant with her metformin and does not always take her medication with meals despite the recommendation to do so and likely the cause of her watery diarrhea.  Denies any sick contacts.  Denies any recent travel.  Patient currently on Plaquenil.  Denies any reported fevers, chills, unintentional weight loss, nausea/vomiting.    Review of Systems   Constitutional: Negative for activity change and appetite change.   HENT: Negative for trouble swallowing.    Eyes: Negative for visual disturbance.   Respiratory: Negative for shortness of breath.    Cardiovascular: Negative for chest pain and palpitations.   Gastrointestinal: Negative for abdominal distention, diarrhea, nausea and vomiting.   Endocrine: Negative for cold intolerance and heat intolerance.   Genitourinary: Negative for flank pain.   Musculoskeletal: Negative for arthralgias.   Skin: Negative for color change.   Allergic/Immunologic: Negative for immunocompromised state.   Neurological: Negative for headaches.   Hematological: Negative for adenopathy.   Psychiatric/Behavioral: Negative for agitation.         Objective:      Vitals:    09/14/20 1507   BP: 138/77   Pulse: 86        Physical Exam  Constitutional:       Appearance: She is well-developed.   HENT:      Head: Normocephalic and atraumatic.   Eyes:       Pupils: Pupils are equal, round, and reactive to light.   Neck:      Musculoskeletal: Normal range of motion and neck supple.   Cardiovascular:      Rate and Rhythm: Normal rate and regular rhythm.      Heart sounds: Normal heart sounds. No murmur. No friction rub. No gallop.    Pulmonary:      Effort: Pulmonary effort is normal.      Breath sounds: Normal breath sounds. No wheezing or rales.   Abdominal:      General: Bowel sounds are normal. There is no distension.      Palpations: Abdomen is soft.      Tenderness: There is no abdominal tenderness.   Musculoskeletal: Normal range of motion.   Skin:     General: Skin is warm and dry.      Capillary Refill: Capillary refill takes less than 2 seconds.   Neurological:      Mental Status: She is alert and oriented to person, place, and time.           Assessment:       1. Essential hypertension    2. Watery diarrhea    3. Protein C deficiency          Plan:       Essential hypertension       -    continue current hypertensive management.    Watery diarrhea  -     Ambulatory referral/consult to Gastroenterology; Future; Expected date: 09/14/2020  -     Stool culture; Future; Expected date: 09/14/2020  -     WBC, Stool; Future; Expected date: 09/14/2020  -     Giardia / Cryptosporidum, EIA; Future; Expected date: 09/14/2020  -     Stool Exam-Ova,Cysts,Parasites; Future; Expected date: 09/14/2020  -     Electrolyte and Osmolality Panel, Feces Random; Future; Expected date: 09/14/2020  -     Hemoglobin A1C; Future; Expected date: 09/14/2020    Protein C deficiency  -     rivaroxaban (XARELTO) 20 mg Tab; Take 1 tablet (20 mg total) by mouth daily with dinner or evening meal.  Dispense: 90 tablet; Refill: 3       Return to clinic p.r.n..    Reynaldo Cobos MD   [employer] [facility], PGY-3

## 2020-09-19 ENCOUNTER — OFFICE VISIT (OUTPATIENT)
Dept: URGENT CARE | Facility: CLINIC | Age: 55
End: 2020-09-19
Payer: MEDICAID

## 2020-09-19 VITALS
RESPIRATION RATE: 20 BRPM | TEMPERATURE: 98 F | OXYGEN SATURATION: 98 % | SYSTOLIC BLOOD PRESSURE: 147 MMHG | BODY MASS INDEX: 20.98 KG/M2 | WEIGHT: 114 LBS | DIASTOLIC BLOOD PRESSURE: 65 MMHG | HEIGHT: 62 IN | HEART RATE: 105 BPM

## 2020-09-19 DIAGNOSIS — H92.01 ACUTE OTALGIA, RIGHT: Primary | ICD-10-CM

## 2020-09-19 DIAGNOSIS — J06.9 VIRAL URI: ICD-10-CM

## 2020-09-19 PROCEDURE — 99214 OFFICE O/P EST MOD 30 MIN: CPT | Mod: S$GLB,,, | Performed by: NURSE PRACTITIONER

## 2020-09-19 PROCEDURE — 99214 PR OFFICE/OUTPT VISIT, EST, LEVL IV, 30-39 MIN: ICD-10-PCS | Mod: S$GLB,,, | Performed by: NURSE PRACTITIONER

## 2020-09-19 RX ORDER — IPRATROPIUM BROMIDE 21 UG/1
2 SPRAY, METERED NASAL 2 TIMES DAILY
Qty: 30 ML | Refills: 0 | Status: SHIPPED | OUTPATIENT
Start: 2020-09-19 | End: 2020-09-26

## 2020-09-19 NOTE — PATIENT INSTRUCTIONS

## 2020-09-19 NOTE — PROGRESS NOTES
"Subjective:       Patient ID: Aggie Rasheed is a 55 y.o. female.    Vitals:  height is 5' 2" (1.575 m) and weight is 51.7 kg (114 lb). Her temporal temperature is 98 °F (36.7 °C). Her blood pressure is 147/65 (abnormal) and her pulse is 105. Her respiration is 20 and oxygen saturation is 98%.     Chief Complaint: Otalgia (right ear, sore throat, swollen glands )    Ms Rasheed comes to the clinic today with 1 day of right ear pain and sinus congestion.  No fever, chills, body aches, cough or shortness of breath. No ageusia or anosmia.  No nausea, vomiting or diarrhea.  No known sick contacts.    Patient states she had external left ear infection 1 month ago and was treated for such with complete relief.  She denies tragal pain to either here on exam.  No drainage from ear.    Otalgia   There is pain in the right ear. This is a new problem. The current episode started yesterday. The problem occurs constantly. The problem has been gradually worsening. There has been no fever. The fever has been present for less than 1 day. The pain is at a severity of 8/10. The pain is moderate. Associated symptoms include a sore throat. Pertinent negatives include no coughing, rash or vomiting. She has tried acetaminophen for the symptoms. The treatment provided no relief.       Constitution: Negative for chills, sweating, fatigue and fever.   HENT: Positive for ear pain, congestion, postnasal drip and sore throat. Negative for sinus pain, sinus pressure and voice change.    Neck: Positive for painful lymph nodes.   Eyes: Negative for eye redness.   Respiratory: Negative for chest tightness, cough, sputum production, bloody sputum, COPD, shortness of breath, stridor, wheezing and asthma.    Gastrointestinal: Negative for nausea and vomiting.   Musculoskeletal: Negative for muscle ache.   Skin: Negative for rash.   Allergic/Immunologic: Negative for seasonal allergies and asthma.   Hematologic/Lymphatic: Positive for swollen lymph " nodes.       Objective:      Physical Exam   Constitutional: She is oriented to person, place, and time. She appears well-developed. She is cooperative.  Non-toxic appearance. She does not appear ill. No distress.   HENT:   Head: Normocephalic and atraumatic.   Ears:   Right Ear: Hearing, external ear and ear canal normal. A middle ear effusion is present.   Left Ear: Hearing, tympanic membrane, external ear and ear canal normal.   Nose: Mucosal edema and rhinorrhea present. No nasal deformity. No epistaxis. Right sinus exhibits no maxillary sinus tenderness and no frontal sinus tenderness. Left sinus exhibits no maxillary sinus tenderness and no frontal sinus tenderness.   Mouth/Throat: Uvula is midline, oropharynx is clear and moist and mucous membranes are normal. No trismus in the jaw. Normal dentition. No uvula swelling. Cobblestoning present. No oropharyngeal exudate, posterior oropharyngeal edema or posterior oropharyngeal erythema.   Clear fluid effusion  No tragal or mastoid pain      Comments: Clear fluid effusion  No tragal or mastoid pain  Eyes: Conjunctivae and lids are normal. No scleral icterus.   Neck: Trachea normal, full passive range of motion without pain and phonation normal. Neck supple. No neck rigidity. No edema and no erythema present.   Cardiovascular: Normal rate, regular rhythm, normal heart sounds and normal pulses.   Pulmonary/Chest: Effort normal and breath sounds normal. No stridor. No respiratory distress. She has no decreased breath sounds. She has no wheezes. She has no rhonchi. She has no rales.   Abdominal: Normal appearance.   Musculoskeletal: Normal range of motion.         General: No deformity.   Neurological: She is alert and oriented to person, place, and time. She exhibits normal muscle tone. Coordination normal.   Skin: Skin is warm, dry, intact, not diaphoretic and not pale. Psychiatric: Her speech is normal and behavior is normal. Judgment and thought content normal.    Nursing note and vitals reviewed.        Assessment:       1. Acute otalgia, right    2. Viral URI        Plan:         Acute otalgia, right    Viral URI  -     ipratropium (ATROVENT) 0.03 % nasal spray; 2 sprays by Nasal route 2 (two) times daily. for 7 days  Dispense: 30 mL; Refill: 0      Patient Instructions     Earache, No Infection (Adult)  Earaches can happen without an infection. This occurs when air and fluid build up behind the eardrum causing a feeling of fullness and discomfort and reduced hearing. This is called otitis media with effusion (OME) or serous otitis media. It means there is fluid in the middle ear. It is not the same as acute otitis media, which is typically from infection.  OME can happen when you have a cold if congestion blocks the passage that drains the middle ear. This passage is called the eustachian tube. OME may also occur with nasal allergies or after a bacterial middle ear infection.    The pain or discomfort may come and go. You may hear clicking or popping sounds when you chew or swallow. You may feel that your balance is off. Or you may hear ringing in the ear.  It often takes from several weeks up to 3 months for the fluid to clear on its own. Oral pain relievers and ear drops help if there is pain. Decongestants and antihistamines sometimes help. Antibiotics don't help since there is no infection. Your doctor may prescribe a nasal spray to help reduce swelling in the nose and eustachian tube. This can allow the ear to drain.  If your OME doesn't improve after 3 months, surgery may be used to drain the fluid and insert a small tube in the eardrum to allow continued drainage.  Because the middle ear fluid can become infected, it is important to watch for signs of an ear infection which may develop later. These signs include increased ear pain, fever, or drainage from the ear.  Home care  The following guidelines will help you care for yourself at home:  · You may use  over-the-counter medicine as directed to control pain, unless another medicine was prescribed. If you have chronic liver or kidney disease or ever had a stomach ulcer or GI bleeding, talk with your doctor before using these medicines. Aspirin should never be used in anyone under 18 years of age who is ill with a fever. It may cause severe liver damage.  · You may use over-the-counter decongestants such as phenylephrine or pseudoephedrine. But they are not always helpful. Don't use nasal spray decongestants more than 3 days. Longer use can make congestion worse. Prescription nasal sprays from your doctor don't typically have those restrictions.  · Antihistamines may help if you are also having allergy symptoms.  · You may use medicines such as guaifenesin to thin mucus and promote drainage.  Follow-up care  Follow up with your healthcare provider or as advised if you are not feeling better after 3 days.  When to seek medical advice  Call your healthcare provider right away if any of the following occur:  · Your ear pain gets worse or does not start to improve   · Fever of 100.4°F (38°C) or higher, or as directed by your healthcare provider  · Fluid or blood draining from the ear  · Headache or sinus pain  · Stiff neck  · Unusual drowsiness or confusion  Date Last Reviewed: 10/1/2016  © 3062-7601 [employer]. [address] [place] [address], [place] [address]. All rights reserved. This information is not intended as a substitute for professional medical care. Always follow your healthcare professional's instructions.

## 2020-10-14 ENCOUNTER — OFFICE VISIT (OUTPATIENT)
Dept: FAMILY MEDICINE | Facility: HOSPITAL | Age: 55
End: 2020-10-14
Attending: SPECIALIST
Payer: MEDICAID

## 2020-10-14 VITALS
HEIGHT: 62 IN | SYSTOLIC BLOOD PRESSURE: 132 MMHG | BODY MASS INDEX: 21.59 KG/M2 | DIASTOLIC BLOOD PRESSURE: 63 MMHG | WEIGHT: 117.31 LBS | HEART RATE: 83 BPM

## 2020-10-14 DIAGNOSIS — R09.89 BILATERAL CAROTID BRUITS: ICD-10-CM

## 2020-10-14 DIAGNOSIS — J32.9 CHRONIC SINUSITIS, UNSPECIFIED LOCATION: ICD-10-CM

## 2020-10-14 DIAGNOSIS — E78.1 HYPERTRIGLYCERIDEMIA: ICD-10-CM

## 2020-10-14 DIAGNOSIS — M32.9 SYSTEMIC LUPUS ERYTHEMATOSUS, UNSPECIFIED SLE TYPE, UNSPECIFIED ORGAN INVOLVEMENT STATUS: ICD-10-CM

## 2020-10-14 DIAGNOSIS — K59.01 SLOW TRANSIT CONSTIPATION: ICD-10-CM

## 2020-10-14 DIAGNOSIS — I10 ESSENTIAL HYPERTENSION: ICD-10-CM

## 2020-10-14 DIAGNOSIS — E11.9 TYPE 2 DIABETES MELLITUS WITHOUT COMPLICATION, WITHOUT LONG-TERM CURRENT USE OF INSULIN: Primary | ICD-10-CM

## 2020-10-14 PROCEDURE — 99213 OFFICE O/P EST LOW 20 MIN: CPT | Performed by: STUDENT IN AN ORGANIZED HEALTH CARE EDUCATION/TRAINING PROGRAM

## 2020-10-14 RX ORDER — ATORVASTATIN CALCIUM 40 MG/1
40 TABLET, FILM COATED ORAL DAILY
Qty: 90 TABLET | Refills: 3 | Status: SHIPPED | OUTPATIENT
Start: 2020-10-14 | End: 2021-10-19 | Stop reason: SDUPTHER

## 2020-10-14 RX ORDER — HYDROXYCHLOROQUINE SULFATE 200 MG/1
TABLET, FILM COATED ORAL
Qty: 60 TABLET | Refills: 12 | Status: SHIPPED | OUTPATIENT
Start: 2020-10-14 | End: 2021-10-19 | Stop reason: SDUPTHER

## 2020-10-14 RX ORDER — METOPROLOL SUCCINATE 50 MG/1
50 TABLET, EXTENDED RELEASE ORAL DAILY
Qty: 30 TABLET | Refills: 12 | Status: SHIPPED | OUTPATIENT
Start: 2020-10-14 | End: 2021-11-11 | Stop reason: SDUPTHER

## 2020-10-14 RX ORDER — POLYETHYLENE GLYCOL 3350 17 G/17G
17 POWDER, FOR SOLUTION ORAL DAILY PRN
Qty: 100 EACH | Refills: 3 | Status: SHIPPED | OUTPATIENT
Start: 2020-10-14 | End: 2021-10-19

## 2020-10-14 RX ORDER — NIFEDIPINE 60 MG/1
TABLET, EXTENDED RELEASE ORAL
COMMUNITY
Start: 2020-10-06 | End: 2021-01-14 | Stop reason: SDUPTHER

## 2020-10-14 RX ORDER — MONTELUKAST SODIUM 10 MG/1
10 TABLET ORAL NIGHTLY
Qty: 30 TABLET | Refills: 12 | Status: SHIPPED | OUTPATIENT
Start: 2020-10-14 | End: 2020-11-13

## 2020-10-14 RX ORDER — HYDRALAZINE HYDROCHLORIDE 50 MG/1
50 TABLET, FILM COATED ORAL 3 TIMES DAILY
Qty: 90 TABLET | Refills: 11 | Status: SHIPPED | OUTPATIENT
Start: 2020-10-14 | End: 2021-12-03 | Stop reason: SDUPTHER

## 2020-10-15 NOTE — PROGRESS NOTES
"Subjective:       Patient ID: Aggie Rasheed is a 55 y.o. female.    Chief Complaint: DM2 Management     HPI   55-year-old female with a past medical history of hypertension, lupus, protein C deficiency on anticoagulation, who presents to clinic for follow up.   Patient reports that her diarrhea has resolved following discontinuation of the metformin.  However, patient is currently constipated and has poor p.o. hydration.  Discussion during visit in detail regarding constipation and measures to avoid in the future.  Will pressures been well controlled at home on current regimen and no new changes will be made today.  Patient in agreement.  Patient notes that since she has been having recent ear infections over the last couple weeks she has noted a whooshing sound" in her years occurring mostly at night when she is lying supine.  Patient denies any palpitations or dizziness episodes or gait instability.  Patient denies any headaches, chest pain, shortness of breath, abdominal pain or nausea/vomiting.     Review of Systems   Constitutional: Negative for activity change and appetite change.   HENT: Negative for trouble swallowing.    Eyes: Negative for visual disturbance.   Respiratory: Negative for shortness of breath.    Cardiovascular: Negative for chest pain and palpitations.   Gastrointestinal: Positive for constipation. Negative for abdominal distention, diarrhea, nausea and vomiting.   Endocrine: Negative for cold intolerance and heat intolerance.   Genitourinary: Negative for flank pain.   Musculoskeletal: Negative for arthralgias.   Skin: Negative for color change.   Allergic/Immunologic: Negative for immunocompromised state.   Neurological: Negative for headaches.   Hematological: Negative for adenopathy.   Psychiatric/Behavioral: Negative for agitation.         Objective:      Vitals:    10/14/20 1549   BP: 132/63   Pulse: 83     Physical Exam  Constitutional:       Appearance: She is well-developed. "   HENT:      Head: Normocephalic and atraumatic.   Eyes:      Pupils: Pupils are equal, round, and reactive to light.   Neck:      Musculoskeletal: Normal range of motion and neck supple.      Vascular: Carotid bruit present.      Comments: Carotid bruit noted bilaterally  Cardiovascular:      Rate and Rhythm: Normal rate and regular rhythm.      Heart sounds: Normal heart sounds. No murmur. No friction rub. No gallop.    Pulmonary:      Effort: Pulmonary effort is normal.      Breath sounds: Normal breath sounds. No wheezing or rales.   Abdominal:      General: Bowel sounds are normal. There is no distension.      Palpations: Abdomen is soft.      Tenderness: There is no abdominal tenderness.   Musculoskeletal: Normal range of motion.   Skin:     General: Skin is warm and dry.      Capillary Refill: Capillary refill takes less than 2 seconds.   Neurological:      Mental Status: She is alert and oriented to person, place, and time.           Assessment:       1. Type 2 diabetes mellitus without complication, without long-term current use of insulin    2. Hypertriglyceridemia    3. Systemic lupus erythematosus, unspecified SLE type, unspecified organ involvement status    4. Essential hypertension    5. Bilateral carotid bruits    6. Chronic sinusitis, unspecified location    7. Slow transit constipation        Plan:       Type 2 diabetes mellitus without complication, without long-term current use of insulin  -     SITagliptin (JANUVIA) 50 MG Tab; Take 1 tablet (50 mg total) by mouth once daily.  Dispense: 90 tablet; Refill: 3    Hypertriglyceridemia  -     atorvastatin (LIPITOR) 40 MG tablet; Take 1 tablet (40 mg total) by mouth once daily.  Dispense: 90 tablet; Refill: 3    Systemic lupus erythematosus, unspecified SLE type, unspecified organ involvement status  -     hydrOXYchloroQUINE (PLAQUENIL) 200 mg tablet; TK 1 T PO BID  Dispense: 60 tablet; Refill: 12    Essential hypertension  -     metoprolol succinate  (TOPROL-XL) 50 MG 24 hr tablet; Take 1 tablet (50 mg total) by mouth once daily.  Dispense: 30 tablet; Refill: 12  -     hydrALAZINE (APRESOLINE) 50 MG tablet; Take 1 tablet (50 mg total) by mouth 3 (three) times daily.  Dispense: 90 tablet; Refill: 11    Bilateral carotid bruits  -     US Carotid Bilateral; Future; Expected date: 10/14/2020    Chronic sinusitis, unspecified location  -     montelukast (SINGULAIR) 10 mg tablet; Take 1 tablet (10 mg total) by mouth every evening.  Dispense: 30 tablet; Refill: 12    Slow transit constipation  -     polyethylene glycol (GLYCOLAX) 17 gram PwPk; Take 17 g by mouth daily as needed (Constipation).  Dispense: 100 each; Refill: 3      Return to clinic in 1 month for follow up    Reynaldo Cobos MD   [employer] [facility], PGY-3

## 2020-10-26 ENCOUNTER — HOSPITAL ENCOUNTER (OUTPATIENT)
Dept: RADIOLOGY | Facility: HOSPITAL | Age: 55
Discharge: HOME | End: 2020-10-26
Attending: STUDENT IN AN ORGANIZED HEALTH CARE EDUCATION/TRAINING PROGRAM
Payer: MEDICAID

## 2020-10-26 DIAGNOSIS — R09.89 BILATERAL CAROTID BRUITS: ICD-10-CM

## 2020-10-26 PROCEDURE — 93880 EXTRACRANIAL BILAT STUDY: CPT | Mod: TC

## 2020-10-26 PROCEDURE — 93880 EXTRACRANIAL BILAT STUDY: CPT | Mod: 26,,, | Performed by: RADIOLOGY

## 2020-10-26 PROCEDURE — 93880 US CAROTID BILATERAL: ICD-10-PCS | Mod: 26,,, | Performed by: RADIOLOGY

## 2020-11-02 DIAGNOSIS — I65.23 BILATERAL CAROTID ARTERY STENOSIS: Primary | ICD-10-CM

## 2020-11-11 DIAGNOSIS — I10 ESSENTIAL HYPERTENSION: ICD-10-CM

## 2020-11-11 RX ORDER — LOSARTAN POTASSIUM 100 MG/1
100 TABLET ORAL DAILY
Qty: 90 TABLET | Refills: 1 | Status: SHIPPED | OUTPATIENT
Start: 2020-11-11 | End: 2021-05-12 | Stop reason: SDUPTHER

## 2020-11-11 NOTE — TELEPHONE ENCOUNTER
----- Message from Liliane Coronado MA sent at 11/11/2020 11:39 AM CST -----  Contact: Patient [phone] ([phone]  Patient requesting refill on losartan (COZAAR) 100 MG tablet; patient is completely out and said pharmacy has requested.  Please send to [facility].  Thanks.

## 2020-11-25 ENCOUNTER — OFFICE VISIT (OUTPATIENT)
Dept: FAMILY MEDICINE | Facility: HOSPITAL | Age: 55
End: 2020-11-25
Payer: MEDICAID

## 2020-11-25 ENCOUNTER — LAB VISIT (OUTPATIENT)
Dept: LAB | Facility: HOSPITAL | Age: 55
End: 2020-11-25
Attending: STUDENT IN AN ORGANIZED HEALTH CARE EDUCATION/TRAINING PROGRAM
Payer: MEDICAID

## 2020-11-25 VITALS
WEIGHT: 115.94 LBS | SYSTOLIC BLOOD PRESSURE: 118 MMHG | BODY MASS INDEX: 21.34 KG/M2 | HEIGHT: 62 IN | DIASTOLIC BLOOD PRESSURE: 69 MMHG | HEART RATE: 77 BPM

## 2020-11-25 DIAGNOSIS — I65.23 BILATERAL CAROTID ARTERY STENOSIS: ICD-10-CM

## 2020-11-25 DIAGNOSIS — E11.9 TYPE 2 DIABETES MELLITUS WITHOUT COMPLICATION, WITHOUT LONG-TERM CURRENT USE OF INSULIN: ICD-10-CM

## 2020-11-25 DIAGNOSIS — Z12.31 ENCOUNTER FOR SCREENING MAMMOGRAM FOR MALIGNANT NEOPLASM OF BREAST: ICD-10-CM

## 2020-11-25 DIAGNOSIS — D64.9 NORMOCYTIC ANEMIA: ICD-10-CM

## 2020-11-25 DIAGNOSIS — I10 ESSENTIAL HYPERTENSION: Primary | ICD-10-CM

## 2020-11-25 LAB
CHOLEST SERPL-MCNC: 108 MG/DL (ref 120–199)
CHOLEST/HDLC SERPL: 3 {RATIO} (ref 2–5)
ESTIMATED AVG GLUCOSE: 105 MG/DL (ref 68–131)
HBA1C MFR BLD HPLC: 5.3 % (ref 4–5.6)
HDLC SERPL-MCNC: 36 MG/DL (ref 40–75)
HDLC SERPL: 33.3 % (ref 20–50)
LDLC SERPL CALC-MCNC: 57.4 MG/DL (ref 63–159)
NONHDLC SERPL-MCNC: 72 MG/DL
TRIGL SERPL-MCNC: 73 MG/DL (ref 30–150)
TSH SERPL DL<=0.005 MIU/L-ACNC: 0.79 UIU/ML (ref 0.4–4)

## 2020-11-25 PROCEDURE — 83036 HEMOGLOBIN GLYCOSYLATED A1C: CPT

## 2020-11-25 PROCEDURE — 36415 COLL VENOUS BLD VENIPUNCTURE: CPT

## 2020-11-25 PROCEDURE — 84443 ASSAY THYROID STIM HORMONE: CPT

## 2020-11-25 PROCEDURE — 80061 LIPID PANEL: CPT

## 2020-11-25 PROCEDURE — 99214 OFFICE O/P EST MOD 30 MIN: CPT | Performed by: STUDENT IN AN ORGANIZED HEALTH CARE EDUCATION/TRAINING PROGRAM

## 2020-11-30 ENCOUNTER — TELEPHONE (OUTPATIENT)
Dept: FAMILY MEDICINE | Facility: HOSPITAL | Age: 55
End: 2020-11-30

## 2020-11-30 NOTE — PROGRESS NOTES
Subjective:       Patient ID: Aggie Rasheed is a 55 y.o. female.    Chief Complaint: HTN Management     HPI   54 y/o female with a pmhx of DM2, Bilateral Carotid stenosis, essential HTN and SLE, who presents to clinic for follow up. Patient reports that she has been complaint with her BP regimen and noted significant improvement with systolic between 120-140s. Patient reports intermittent weakness and noted systolic pressure in 90s and dizziness. Patient previously had diarrhea, however has stopped since metformin discontinued. Patient understands that lower BP may be inappropriate for her age and underlying conditions/symptoms. Patient in agreement to begin to de-escalate BP regimen with close monitoring and supervision only.    Review of Systems   Constitutional: Negative for activity change and appetite change.   HENT: Negative for trouble swallowing.    Eyes: Negative for visual disturbance.   Respiratory: Negative for shortness of breath.    Cardiovascular: Negative for chest pain and palpitations.   Gastrointestinal: Negative for abdominal distention, diarrhea, nausea and vomiting.   Endocrine: Negative for cold intolerance and heat intolerance.   Genitourinary: Negative for flank pain.   Musculoskeletal: Negative for arthralgias.   Skin: Negative for color change.   Allergic/Immunologic: Negative for immunocompromised state.   Neurological: Negative for headaches.   Hematological: Negative for adenopathy.   Psychiatric/Behavioral: Negative for agitation.       Objective:      Vitals:    11/25/20 1043   BP: 118/69   Pulse: 77     Physical Exam  Constitutional:       Appearance: She is well-developed.   HENT:      Head: Normocephalic and atraumatic.   Eyes:      Pupils: Pupils are equal, round, and reactive to light.   Neck:      Musculoskeletal: Normal range of motion and neck supple.   Cardiovascular:      Rate and Rhythm: Normal rate and regular rhythm.      Heart sounds: Normal heart sounds. No murmur.  No friction rub. No gallop.       Comments: 2/6 systolic murmur in aortic area with radiation to carotids.   Pulmonary:      Effort: Pulmonary effort is normal.      Breath sounds: Normal breath sounds. No wheezing or rales.   Abdominal:      General: Bowel sounds are normal. There is no distension.      Palpations: Abdomen is soft.      Tenderness: There is no abdominal tenderness.   Musculoskeletal: Normal range of motion.   Skin:     General: Skin is warm and dry.      Capillary Refill: Capillary refill takes less than 2 seconds.   Neurological:      Mental Status: She is alert and oriented to person, place, and time.         Assessment:       1. Essential hypertension    2. Type 2 diabetes mellitus without complication, without long-term current use of insulin    3. Bilateral carotid artery stenosis    4. Encounter for screening mammogram for malignant neoplasm of breast    5. Normocytic anemia        Plan:       Essential hypertension        -     Continue current regimen. Patient to begin de-escalating BP regimen from hydralazine TID to BID with goal /90.         Type 2 diabetes mellitus without complication, without long-term current use of insulin  -     Hemoglobin A1C; Future; Expected date: 11/25/2020  -     Lipid Panel; Future; Expected date: 11/25/2020  -     TSH; Future; Expected date: 11/25/2020    Bilateral carotid artery stenosis         -     Stenosis 50-69% referral already placed to vascular surgery for possible future intervention     Encounter for screening mammogram for malignant neoplasm of breast  -     Mammo Digital Screening Bilat; Future; Expected date: 11/25/2020    Normocytic Anemia         -     Ambulatory referral to GI already placed. Hx of Tubular Adenoma. On anticoagulation.      Return to clinic PRN    Reynaldo Cobos MD   [employer] [facility], PGY-3

## 2020-12-09 ENCOUNTER — HOSPITAL ENCOUNTER (OUTPATIENT)
Dept: RADIOLOGY | Facility: HOSPITAL | Age: 55
Discharge: HOME | End: 2020-12-09
Attending: STUDENT IN AN ORGANIZED HEALTH CARE EDUCATION/TRAINING PROGRAM
Payer: MEDICAID

## 2020-12-09 DIAGNOSIS — Z12.31 ENCOUNTER FOR SCREENING MAMMOGRAM FOR MALIGNANT NEOPLASM OF BREAST: ICD-10-CM

## 2020-12-09 PROCEDURE — 77063 MAMMO DIGITAL SCREENING BILAT WITH TOMO: ICD-10-PCS | Mod: 26,,, | Performed by: RADIOLOGY

## 2020-12-09 PROCEDURE — 77067 MAMMO DIGITAL SCREENING BILAT WITH TOMO: ICD-10-PCS | Mod: 26,,, | Performed by: RADIOLOGY

## 2020-12-09 PROCEDURE — 77067 SCR MAMMO BI INCL CAD: CPT | Mod: 26,,, | Performed by: RADIOLOGY

## 2020-12-09 PROCEDURE — 77063 BREAST TOMOSYNTHESIS BI: CPT | Mod: 26,,, | Performed by: RADIOLOGY

## 2020-12-09 PROCEDURE — 77067 SCR MAMMO BI INCL CAD: CPT | Mod: TC

## 2021-01-14 DIAGNOSIS — I10 ESSENTIAL HYPERTENSION: ICD-10-CM

## 2021-01-25 DIAGNOSIS — I10 ESSENTIAL HYPERTENSION: ICD-10-CM

## 2021-01-25 RX ORDER — ONDANSETRON 4 MG/1
4 TABLET, ORALLY DISINTEGRATING ORAL EVERY 8 HOURS PRN
Qty: 90 TABLET | Refills: 12 | Status: SHIPPED | OUTPATIENT
Start: 2021-01-25 | End: 2021-10-19

## 2021-01-25 RX ORDER — NIFEDIPINE 60 MG/1
TABLET, EXTENDED RELEASE ORAL
Qty: 90 TABLET | Refills: 0 | Status: SHIPPED | OUTPATIENT
Start: 2021-01-25 | End: 2021-07-29 | Stop reason: SDUPTHER

## 2021-01-25 RX ORDER — NIFEDIPINE 60 MG/1
TABLET, EXTENDED RELEASE ORAL
Qty: 90 TABLET | Refills: 0 | Status: SHIPPED | OUTPATIENT
Start: 2021-01-25 | End: 2021-01-25 | Stop reason: SDUPTHER

## 2021-03-11 ENCOUNTER — OFFICE VISIT (OUTPATIENT)
Dept: FAMILY MEDICINE | Facility: HOSPITAL | Age: 56
End: 2021-03-11
Attending: FAMILY MEDICINE
Payer: MEDICAID

## 2021-03-11 ENCOUNTER — LAB VISIT (OUTPATIENT)
Dept: LAB | Facility: HOSPITAL | Age: 56
End: 2021-03-11
Attending: FAMILY MEDICINE
Payer: MEDICAID

## 2021-03-11 VITALS
DIASTOLIC BLOOD PRESSURE: 59 MMHG | SYSTOLIC BLOOD PRESSURE: 132 MMHG | WEIGHT: 119.69 LBS | HEART RATE: 72 BPM | BODY MASS INDEX: 22.03 KG/M2 | HEIGHT: 62 IN

## 2021-03-11 DIAGNOSIS — D64.9 NORMOCYTIC ANEMIA: ICD-10-CM

## 2021-03-11 DIAGNOSIS — K59.03 DRUG-INDUCED CONSTIPATION: ICD-10-CM

## 2021-03-11 DIAGNOSIS — G62.9 PERIPHERAL POLYNEUROPATHY: ICD-10-CM

## 2021-03-11 DIAGNOSIS — R53.81 PHYSICAL DECONDITIONING: ICD-10-CM

## 2021-03-11 DIAGNOSIS — D64.9 NORMOCYTIC ANEMIA: Primary | ICD-10-CM

## 2021-03-11 LAB
BASOPHILS # BLD AUTO: 0.03 K/UL (ref 0–0.2)
BASOPHILS NFR BLD: 0.9 % (ref 0–1.9)
DIFFERENTIAL METHOD: ABNORMAL
EOSINOPHIL # BLD AUTO: 0.1 K/UL (ref 0–0.5)
EOSINOPHIL NFR BLD: 2.8 % (ref 0–8)
ERYTHROCYTE [DISTWIDTH] IN BLOOD BY AUTOMATED COUNT: 13.3 % (ref 11.5–14.5)
HCT VFR BLD AUTO: 37.2 % (ref 37–48.5)
HGB BLD-MCNC: 12.1 G/DL (ref 12–16)
IMM GRANULOCYTES # BLD AUTO: 0.01 K/UL (ref 0–0.04)
IMM GRANULOCYTES NFR BLD AUTO: 0.3 % (ref 0–0.5)
LYMPHOCYTES # BLD AUTO: 1 K/UL (ref 1–4.8)
LYMPHOCYTES NFR BLD: 29.8 % (ref 18–48)
MCH RBC QN AUTO: 28.5 PG (ref 27–31)
MCHC RBC AUTO-ENTMCNC: 32.5 G/DL (ref 32–36)
MCV RBC AUTO: 88 FL (ref 82–98)
MONOCYTES # BLD AUTO: 0.2 K/UL (ref 0.3–1)
MONOCYTES NFR BLD: 6.8 % (ref 4–15)
NEUTROPHILS # BLD AUTO: 1.9 K/UL (ref 1.8–7.7)
NEUTROPHILS NFR BLD: 59.4 % (ref 38–73)
NRBC BLD-RTO: 0 /100 WBC
PLATELET # BLD AUTO: 128 K/UL (ref 150–350)
PMV BLD AUTO: 11.9 FL (ref 9.2–12.9)
RBC # BLD AUTO: 4.25 M/UL (ref 4–5.4)
WBC # BLD AUTO: 3.25 K/UL (ref 3.9–12.7)

## 2021-03-11 PROCEDURE — 84425 ASSAY OF VITAMIN B-1: CPT | Performed by: STUDENT IN AN ORGANIZED HEALTH CARE EDUCATION/TRAINING PROGRAM

## 2021-03-11 PROCEDURE — 82607 VITAMIN B-12: CPT | Performed by: STUDENT IN AN ORGANIZED HEALTH CARE EDUCATION/TRAINING PROGRAM

## 2021-03-11 PROCEDURE — 36415 COLL VENOUS BLD VENIPUNCTURE: CPT | Performed by: STUDENT IN AN ORGANIZED HEALTH CARE EDUCATION/TRAINING PROGRAM

## 2021-03-11 PROCEDURE — 85025 COMPLETE CBC W/AUTO DIFF WBC: CPT | Performed by: STUDENT IN AN ORGANIZED HEALTH CARE EDUCATION/TRAINING PROGRAM

## 2021-03-11 PROCEDURE — 99214 OFFICE O/P EST MOD 30 MIN: CPT | Performed by: STUDENT IN AN ORGANIZED HEALTH CARE EDUCATION/TRAINING PROGRAM

## 2021-03-11 PROCEDURE — 83090 ASSAY OF HOMOCYSTEINE: CPT | Performed by: STUDENT IN AN ORGANIZED HEALTH CARE EDUCATION/TRAINING PROGRAM

## 2021-03-11 RX ORDER — DOCUSATE SODIUM 100 MG/1
100 CAPSULE, LIQUID FILLED ORAL 2 TIMES DAILY PRN
Qty: 60 CAPSULE | Refills: 11 | Status: SHIPPED | OUTPATIENT
Start: 2021-03-11 | End: 2021-10-19

## 2021-03-11 RX ORDER — MONTELUKAST SODIUM 10 MG/1
TABLET ORAL
COMMUNITY
Start: 2021-01-04 | End: 2021-03-11

## 2021-03-11 RX ORDER — GABAPENTIN 600 MG/1
1200 TABLET ORAL 3 TIMES DAILY
Qty: 180 TABLET | Refills: 11 | Status: SHIPPED | OUTPATIENT
Start: 2021-03-11 | End: 2022-03-18 | Stop reason: SDUPTHER

## 2021-03-12 LAB
HCYS SERPL-SCNC: 28.7 UMOL/L (ref 4–15.5)
VIT B12 SERPL-MCNC: 213 PG/ML (ref 210–950)

## 2021-03-12 RX ORDER — CYANOCOBALAMIN 1000 UG/ML
1000 INJECTION, SOLUTION INTRAMUSCULAR; SUBCUTANEOUS DAILY
Qty: 7 ML | Refills: 0 | Status: SHIPPED | OUTPATIENT
Start: 2021-03-12 | End: 2021-03-19

## 2021-03-16 LAB — VIT B1 BLD-MCNC: 49 UG/L (ref 38–122)

## 2021-03-16 RX ORDER — LANOLIN ALCOHOL/MO/W.PET/CERES
100 CREAM (GRAM) TOPICAL DAILY
Qty: 90 TABLET | Refills: 3 | Status: SHIPPED | OUTPATIENT
Start: 2021-03-16 | End: 2021-10-19

## 2021-04-20 ENCOUNTER — CLINICAL SUPPORT (OUTPATIENT)
Dept: REHABILITATION | Facility: HOSPITAL | Age: 56
End: 2021-04-20
Payer: MEDICAID

## 2021-04-20 DIAGNOSIS — M53.86 DECREASED ROM OF INTERVERTEBRAL DISCS OF LUMBAR SPINE: ICD-10-CM

## 2021-04-20 DIAGNOSIS — R53.81 PHYSICAL DECONDITIONING: ICD-10-CM

## 2021-04-20 DIAGNOSIS — Z74.09 DECREASED MOBILITY AND ENDURANCE: ICD-10-CM

## 2021-04-20 DIAGNOSIS — R53.1 DECREASED STRENGTH: ICD-10-CM

## 2021-04-20 PROCEDURE — 97161 PT EVAL LOW COMPLEX 20 MIN: CPT | Mod: PN

## 2021-05-03 ENCOUNTER — CLINICAL SUPPORT (OUTPATIENT)
Dept: URGENT CARE | Facility: CLINIC | Age: 56
End: 2021-05-03
Payer: MEDICAID

## 2021-05-03 ENCOUNTER — OFFICE VISIT (OUTPATIENT)
Dept: FAMILY MEDICINE | Facility: HOSPITAL | Age: 56
End: 2021-05-03
Attending: FAMILY MEDICINE
Payer: MEDICAID

## 2021-05-03 VITALS
DIASTOLIC BLOOD PRESSURE: 67 MMHG | HEIGHT: 62 IN | WEIGHT: 118.81 LBS | SYSTOLIC BLOOD PRESSURE: 128 MMHG | TEMPERATURE: 100 F | HEART RATE: 66 BPM | BODY MASS INDEX: 21.86 KG/M2

## 2021-05-03 DIAGNOSIS — J01.90 ACUTE BACTERIAL SINUSITIS: Primary | ICD-10-CM

## 2021-05-03 DIAGNOSIS — B96.89 ACUTE BACTERIAL SINUSITIS: Primary | ICD-10-CM

## 2021-05-03 DIAGNOSIS — Z11.52 ENCOUNTER FOR SCREENING LABORATORY TESTING FOR COVID-19 VIRUS: Primary | ICD-10-CM

## 2021-05-03 LAB
CTP QC/QA: YES
SARS-COV-2 RDRP RESP QL NAA+PROBE: NEGATIVE

## 2021-05-03 PROCEDURE — U0002 COVID-19 LAB TEST NON-CDC: HCPCS | Mod: QW,S$GLB,, | Performed by: FAMILY MEDICINE

## 2021-05-03 PROCEDURE — 99213 OFFICE O/P EST LOW 20 MIN: CPT | Performed by: STUDENT IN AN ORGANIZED HEALTH CARE EDUCATION/TRAINING PROGRAM

## 2021-05-03 PROCEDURE — U0002: ICD-10-PCS | Mod: QW,S$GLB,, | Performed by: FAMILY MEDICINE

## 2021-05-03 RX ORDER — AMOXICILLIN AND CLAVULANATE POTASSIUM 875; 125 MG/1; MG/1
1 TABLET, FILM COATED ORAL EVERY 12 HOURS
Qty: 14 TABLET | Refills: 0 | Status: SHIPPED | OUTPATIENT
Start: 2021-05-03 | End: 2021-05-10

## 2021-05-03 RX ORDER — FLUTICASONE PROPIONATE 50 MCG
1 SPRAY, SUSPENSION (ML) NASAL DAILY
Qty: 9.9 ML | Refills: 0 | Status: SHIPPED | OUTPATIENT
Start: 2021-05-03 | End: 2021-10-19

## 2021-05-03 RX ORDER — CETIRIZINE HYDROCHLORIDE 10 MG/1
10 TABLET ORAL DAILY
Qty: 30 TABLET | Refills: 11 | Status: SHIPPED | OUTPATIENT
Start: 2021-05-03 | End: 2021-10-19

## 2021-05-03 RX ORDER — BENZONATATE 100 MG/1
100 CAPSULE ORAL 3 TIMES DAILY PRN
Qty: 30 CAPSULE | Refills: 0 | Status: SHIPPED | OUTPATIENT
Start: 2021-05-03 | End: 2021-05-13

## 2021-05-03 RX ORDER — ASPIRIN 325 MG/1
100 TABLET, FILM COATED ORAL DAILY
COMMUNITY
Start: 2021-04-13 | End: 2021-10-19

## 2021-05-12 DIAGNOSIS — I10 ESSENTIAL HYPERTENSION: ICD-10-CM

## 2021-05-14 DIAGNOSIS — I10 ESSENTIAL HYPERTENSION: ICD-10-CM

## 2021-05-16 RX ORDER — LOSARTAN POTASSIUM 100 MG/1
100 TABLET ORAL DAILY
Qty: 90 TABLET | Refills: 1 | OUTPATIENT
Start: 2021-05-16 | End: 2022-05-16

## 2021-05-16 RX ORDER — LOSARTAN POTASSIUM 100 MG/1
100 TABLET ORAL DAILY
Qty: 90 TABLET | Refills: 1 | Status: SHIPPED | OUTPATIENT
Start: 2021-05-16 | End: 2021-10-19 | Stop reason: SDUPTHER

## 2021-05-24 ENCOUNTER — CLINICAL SUPPORT (OUTPATIENT)
Dept: REHABILITATION | Facility: HOSPITAL | Age: 56
End: 2021-05-24
Payer: MEDICAID

## 2021-05-24 DIAGNOSIS — Z74.09 DECREASED MOBILITY AND ENDURANCE: ICD-10-CM

## 2021-05-24 DIAGNOSIS — R53.1 DECREASED STRENGTH: ICD-10-CM

## 2021-05-24 DIAGNOSIS — M53.86 DECREASED ROM OF INTERVERTEBRAL DISCS OF LUMBAR SPINE: ICD-10-CM

## 2021-05-24 PROCEDURE — 97110 THERAPEUTIC EXERCISES: CPT | Mod: PN

## 2021-05-27 ENCOUNTER — CLINICAL SUPPORT (OUTPATIENT)
Dept: REHABILITATION | Facility: HOSPITAL | Age: 56
End: 2021-05-27
Payer: MEDICAID

## 2021-05-27 DIAGNOSIS — Z74.09 DECREASED MOBILITY AND ENDURANCE: ICD-10-CM

## 2021-05-27 DIAGNOSIS — M53.86 DECREASED ROM OF INTERVERTEBRAL DISCS OF LUMBAR SPINE: ICD-10-CM

## 2021-05-27 DIAGNOSIS — R53.1 DECREASED STRENGTH: ICD-10-CM

## 2021-05-27 PROCEDURE — 97110 THERAPEUTIC EXERCISES: CPT | Mod: PN,CQ

## 2021-05-31 ENCOUNTER — CLINICAL SUPPORT (OUTPATIENT)
Dept: REHABILITATION | Facility: HOSPITAL | Age: 56
End: 2021-05-31
Payer: MEDICAID

## 2021-05-31 DIAGNOSIS — M53.86 DECREASED ROM OF INTERVERTEBRAL DISCS OF LUMBAR SPINE: ICD-10-CM

## 2021-05-31 DIAGNOSIS — Z74.09 DECREASED MOBILITY AND ENDURANCE: ICD-10-CM

## 2021-05-31 DIAGNOSIS — R53.1 DECREASED STRENGTH: ICD-10-CM

## 2021-05-31 PROCEDURE — 97110 THERAPEUTIC EXERCISES: CPT | Mod: PN | Performed by: PHYSICAL THERAPIST

## 2021-06-01 ENCOUNTER — DOCUMENTATION ONLY (OUTPATIENT)
Dept: REHABILITATION | Facility: HOSPITAL | Age: 56
End: 2021-06-01

## 2021-06-03 ENCOUNTER — CLINICAL SUPPORT (OUTPATIENT)
Dept: REHABILITATION | Facility: HOSPITAL | Age: 56
End: 2021-06-03
Payer: MEDICAID

## 2021-06-03 DIAGNOSIS — Z74.09 DECREASED MOBILITY AND ENDURANCE: ICD-10-CM

## 2021-06-03 DIAGNOSIS — R53.1 DECREASED STRENGTH: ICD-10-CM

## 2021-06-03 DIAGNOSIS — M53.86 DECREASED ROM OF INTERVERTEBRAL DISCS OF LUMBAR SPINE: ICD-10-CM

## 2021-06-03 PROCEDURE — 97110 THERAPEUTIC EXERCISES: CPT | Mod: PN

## 2021-06-07 ENCOUNTER — CLINICAL SUPPORT (OUTPATIENT)
Dept: REHABILITATION | Facility: HOSPITAL | Age: 56
End: 2021-06-07
Payer: MEDICAID

## 2021-06-07 DIAGNOSIS — M53.86 DECREASED ROM OF INTERVERTEBRAL DISCS OF LUMBAR SPINE: ICD-10-CM

## 2021-06-07 DIAGNOSIS — R53.1 DECREASED STRENGTH: ICD-10-CM

## 2021-06-07 DIAGNOSIS — Z74.09 DECREASED MOBILITY AND ENDURANCE: ICD-10-CM

## 2021-06-07 PROCEDURE — 97110 THERAPEUTIC EXERCISES: CPT | Mod: PN

## 2021-06-09 ENCOUNTER — CLINICAL SUPPORT (OUTPATIENT)
Dept: REHABILITATION | Facility: HOSPITAL | Age: 56
End: 2021-06-09
Payer: MEDICAID

## 2021-06-09 DIAGNOSIS — R53.1 DECREASED STRENGTH: ICD-10-CM

## 2021-06-09 DIAGNOSIS — M53.86 DECREASED ROM OF INTERVERTEBRAL DISCS OF LUMBAR SPINE: ICD-10-CM

## 2021-06-09 DIAGNOSIS — Z74.09 DECREASED MOBILITY AND ENDURANCE: ICD-10-CM

## 2021-06-09 PROCEDURE — 97110 THERAPEUTIC EXERCISES: CPT | Mod: PN | Performed by: PHYSICAL THERAPIST

## 2021-06-17 ENCOUNTER — CLINICAL SUPPORT (OUTPATIENT)
Dept: REHABILITATION | Facility: HOSPITAL | Age: 56
End: 2021-06-17
Payer: MEDICAID

## 2021-06-17 DIAGNOSIS — Z74.09 DECREASED MOBILITY AND ENDURANCE: ICD-10-CM

## 2021-06-17 DIAGNOSIS — M53.86 DECREASED ROM OF INTERVERTEBRAL DISCS OF LUMBAR SPINE: ICD-10-CM

## 2021-06-17 DIAGNOSIS — R53.1 DECREASED STRENGTH: ICD-10-CM

## 2021-06-17 PROCEDURE — 97110 THERAPEUTIC EXERCISES: CPT | Mod: PN,CQ

## 2021-06-21 ENCOUNTER — CLINICAL SUPPORT (OUTPATIENT)
Dept: REHABILITATION | Facility: HOSPITAL | Age: 56
End: 2021-06-21
Payer: MEDICAID

## 2021-06-21 DIAGNOSIS — M53.86 DECREASED ROM OF INTERVERTEBRAL DISCS OF LUMBAR SPINE: ICD-10-CM

## 2021-06-21 DIAGNOSIS — Z74.09 DECREASED MOBILITY AND ENDURANCE: ICD-10-CM

## 2021-06-21 DIAGNOSIS — R53.1 DECREASED STRENGTH: ICD-10-CM

## 2021-06-21 PROCEDURE — 97110 THERAPEUTIC EXERCISES: CPT | Mod: PN

## 2021-06-25 ENCOUNTER — CLINICAL SUPPORT (OUTPATIENT)
Dept: REHABILITATION | Facility: HOSPITAL | Age: 56
End: 2021-06-25
Payer: MEDICAID

## 2021-06-25 DIAGNOSIS — M53.86 DECREASED ROM OF INTERVERTEBRAL DISCS OF LUMBAR SPINE: ICD-10-CM

## 2021-06-25 DIAGNOSIS — R53.1 DECREASED STRENGTH: ICD-10-CM

## 2021-06-25 DIAGNOSIS — Z74.09 DECREASED MOBILITY AND ENDURANCE: ICD-10-CM

## 2021-06-25 PROCEDURE — 97110 THERAPEUTIC EXERCISES: CPT | Mod: PN | Performed by: PHYSICAL THERAPIST

## 2021-06-28 ENCOUNTER — CLINICAL SUPPORT (OUTPATIENT)
Dept: REHABILITATION | Facility: HOSPITAL | Age: 56
End: 2021-06-28
Payer: MEDICAID

## 2021-06-28 DIAGNOSIS — M53.86 DECREASED ROM OF INTERVERTEBRAL DISCS OF LUMBAR SPINE: ICD-10-CM

## 2021-06-28 DIAGNOSIS — R53.1 DECREASED STRENGTH: ICD-10-CM

## 2021-06-28 DIAGNOSIS — Z74.09 DECREASED MOBILITY AND ENDURANCE: ICD-10-CM

## 2021-06-28 PROCEDURE — 97110 THERAPEUTIC EXERCISES: CPT | Mod: PN

## 2021-07-29 ENCOUNTER — LAB VISIT (OUTPATIENT)
Dept: LAB | Facility: HOSPITAL | Age: 56
End: 2021-07-29
Attending: STUDENT IN AN ORGANIZED HEALTH CARE EDUCATION/TRAINING PROGRAM
Payer: MEDICAID

## 2021-07-29 ENCOUNTER — OFFICE VISIT (OUTPATIENT)
Dept: FAMILY MEDICINE | Facility: HOSPITAL | Age: 56
End: 2021-07-29
Payer: MEDICAID

## 2021-07-29 VITALS
BODY MASS INDEX: 22.76 KG/M2 | HEIGHT: 62 IN | DIASTOLIC BLOOD PRESSURE: 81 MMHG | HEART RATE: 82 BPM | SYSTOLIC BLOOD PRESSURE: 203 MMHG | WEIGHT: 123.69 LBS

## 2021-07-29 DIAGNOSIS — Z11.59 ENCOUNTER FOR HEPATITIS C SCREENING TEST FOR LOW RISK PATIENT: ICD-10-CM

## 2021-07-29 DIAGNOSIS — I10 ESSENTIAL HYPERTENSION: Primary | ICD-10-CM

## 2021-07-29 DIAGNOSIS — G62.9 PERIPHERAL POLYNEUROPATHY: ICD-10-CM

## 2021-07-29 DIAGNOSIS — Z11.4 ENCOUNTER FOR SCREENING FOR HIV: ICD-10-CM

## 2021-07-29 DIAGNOSIS — M79.605 PAIN OF LEFT LOWER EXTREMITY: ICD-10-CM

## 2021-07-29 DIAGNOSIS — R20.0 NUMBNESS OF FINGER: ICD-10-CM

## 2021-07-29 DIAGNOSIS — Z72.0 TOBACCO ABUSE: ICD-10-CM

## 2021-07-29 DIAGNOSIS — I10 ESSENTIAL HYPERTENSION: ICD-10-CM

## 2021-07-29 LAB
ALBUMIN SERPL BCP-MCNC: 3.6 G/DL (ref 3.5–5.2)
ALP SERPL-CCNC: 100 U/L (ref 55–135)
ALT SERPL W/O P-5'-P-CCNC: 21 U/L (ref 10–44)
ANION GAP SERPL CALC-SCNC: 11 MMOL/L (ref 8–16)
AST SERPL-CCNC: 24 U/L (ref 10–40)
BASOPHILS # BLD AUTO: 0.01 K/UL (ref 0–0.2)
BASOPHILS NFR BLD: 0.2 % (ref 0–1.9)
BILIRUB SERPL-MCNC: 0.4 MG/DL (ref 0.1–1)
BUN SERPL-MCNC: 24 MG/DL (ref 6–20)
CALCIUM SERPL-MCNC: 9 MG/DL (ref 8.7–10.5)
CHLORIDE SERPL-SCNC: 113 MMOL/L (ref 95–110)
CHOLEST SERPL-MCNC: 135 MG/DL (ref 120–199)
CHOLEST/HDLC SERPL: 2.9 {RATIO} (ref 2–5)
CO2 SERPL-SCNC: 20 MMOL/L (ref 23–29)
CREAT SERPL-MCNC: 1.5 MG/DL (ref 0.5–1.4)
DIFFERENTIAL METHOD: ABNORMAL
EOSINOPHIL # BLD AUTO: 0.1 K/UL (ref 0–0.5)
EOSINOPHIL NFR BLD: 2.3 % (ref 0–8)
ERYTHROCYTE [DISTWIDTH] IN BLOOD BY AUTOMATED COUNT: 13.4 % (ref 11.5–14.5)
EST. GFR  (AFRICAN AMERICAN): 44 ML/MIN/1.73 M^2
EST. GFR  (NON AFRICAN AMERICAN): 38 ML/MIN/1.73 M^2
ESTIMATED AVG GLUCOSE: 105 MG/DL (ref 68–131)
GLUCOSE SERPL-MCNC: 103 MG/DL (ref 70–110)
HBA1C MFR BLD: 5.3 % (ref 4–5.6)
HCT VFR BLD AUTO: 33.6 % (ref 37–48.5)
HDLC SERPL-MCNC: 46 MG/DL (ref 40–75)
HDLC SERPL: 34.1 % (ref 20–50)
HGB BLD-MCNC: 10.9 G/DL (ref 12–16)
IMM GRANULOCYTES # BLD AUTO: 0.01 K/UL (ref 0–0.04)
IMM GRANULOCYTES NFR BLD AUTO: 0.2 % (ref 0–0.5)
LDLC SERPL CALC-MCNC: 70.4 MG/DL (ref 63–159)
LYMPHOCYTES # BLD AUTO: 0.9 K/UL (ref 1–4.8)
LYMPHOCYTES NFR BLD: 20.2 % (ref 18–48)
MCH RBC QN AUTO: 28.9 PG (ref 27–31)
MCHC RBC AUTO-ENTMCNC: 32.4 G/DL (ref 32–36)
MCV RBC AUTO: 89 FL (ref 82–98)
MONOCYTES # BLD AUTO: 0.3 K/UL (ref 0.3–1)
MONOCYTES NFR BLD: 7 % (ref 4–15)
NEUTROPHILS # BLD AUTO: 3 K/UL (ref 1.8–7.7)
NEUTROPHILS NFR BLD: 70.1 % (ref 38–73)
NONHDLC SERPL-MCNC: 89 MG/DL
NRBC BLD-RTO: 0 /100 WBC
PLATELET # BLD AUTO: 107 K/UL (ref 150–450)
PMV BLD AUTO: 12.1 FL (ref 9.2–12.9)
POTASSIUM SERPL-SCNC: 4.3 MMOL/L (ref 3.5–5.1)
PROT SERPL-MCNC: 6.8 G/DL (ref 6–8.4)
RBC # BLD AUTO: 3.77 M/UL (ref 4–5.4)
SODIUM SERPL-SCNC: 144 MMOL/L (ref 136–145)
TRIGL SERPL-MCNC: 93 MG/DL (ref 30–150)
TSH SERPL DL<=0.005 MIU/L-ACNC: 0.45 UIU/ML (ref 0.4–4)
VIT B12 SERPL-MCNC: 297 PG/ML (ref 210–950)
WBC # BLD AUTO: 4.31 K/UL (ref 3.9–12.7)

## 2021-07-29 PROCEDURE — 80061 LIPID PANEL: CPT | Performed by: STUDENT IN AN ORGANIZED HEALTH CARE EDUCATION/TRAINING PROGRAM

## 2021-07-29 PROCEDURE — 83036 HEMOGLOBIN GLYCOSYLATED A1C: CPT | Performed by: STUDENT IN AN ORGANIZED HEALTH CARE EDUCATION/TRAINING PROGRAM

## 2021-07-29 PROCEDURE — 80053 COMPREHEN METABOLIC PANEL: CPT | Performed by: STUDENT IN AN ORGANIZED HEALTH CARE EDUCATION/TRAINING PROGRAM

## 2021-07-29 PROCEDURE — 84425 ASSAY OF VITAMIN B-1: CPT | Performed by: STUDENT IN AN ORGANIZED HEALTH CARE EDUCATION/TRAINING PROGRAM

## 2021-07-29 PROCEDURE — 87389 HIV-1 AG W/HIV-1&-2 AB AG IA: CPT | Performed by: STUDENT IN AN ORGANIZED HEALTH CARE EDUCATION/TRAINING PROGRAM

## 2021-07-29 PROCEDURE — 82607 VITAMIN B-12: CPT | Performed by: STUDENT IN AN ORGANIZED HEALTH CARE EDUCATION/TRAINING PROGRAM

## 2021-07-29 PROCEDURE — 85025 COMPLETE CBC W/AUTO DIFF WBC: CPT | Performed by: STUDENT IN AN ORGANIZED HEALTH CARE EDUCATION/TRAINING PROGRAM

## 2021-07-29 PROCEDURE — 99214 OFFICE O/P EST MOD 30 MIN: CPT | Performed by: STUDENT IN AN ORGANIZED HEALTH CARE EDUCATION/TRAINING PROGRAM

## 2021-07-29 PROCEDURE — 86803 HEPATITIS C AB TEST: CPT | Performed by: STUDENT IN AN ORGANIZED HEALTH CARE EDUCATION/TRAINING PROGRAM

## 2021-07-29 PROCEDURE — 84443 ASSAY THYROID STIM HORMONE: CPT | Performed by: STUDENT IN AN ORGANIZED HEALTH CARE EDUCATION/TRAINING PROGRAM

## 2021-07-29 RX ORDER — NIFEDIPINE 60 MG/1
60 TABLET, EXTENDED RELEASE ORAL DAILY
Qty: 90 TABLET | Refills: 1 | Status: SHIPPED | OUTPATIENT
Start: 2021-07-29 | End: 2022-01-25 | Stop reason: SDUPTHER

## 2021-07-30 PROBLEM — Z72.0 TOBACCO ABUSE: Status: ACTIVE | Noted: 2021-07-30

## 2021-07-30 LAB
HCV AB SERPL QL IA: NEGATIVE
HIV 1+2 AB+HIV1 P24 AG SERPL QL IA: NEGATIVE

## 2021-08-02 ENCOUNTER — PATIENT MESSAGE (OUTPATIENT)
Dept: FAMILY MEDICINE | Facility: HOSPITAL | Age: 56
End: 2021-08-02

## 2021-08-03 ENCOUNTER — CLINICAL SUPPORT (OUTPATIENT)
Dept: REHABILITATION | Facility: HOSPITAL | Age: 56
End: 2021-08-03
Payer: MEDICAID

## 2021-08-03 DIAGNOSIS — M53.86 DECREASED ROM OF INTERVERTEBRAL DISCS OF LUMBAR SPINE: ICD-10-CM

## 2021-08-03 DIAGNOSIS — Z74.09 DECREASED MOBILITY AND ENDURANCE: ICD-10-CM

## 2021-08-03 DIAGNOSIS — R53.1 DECREASED STRENGTH: ICD-10-CM

## 2021-08-03 LAB — VIT B1 BLD-MCNC: 58 UG/L (ref 38–122)

## 2021-08-03 PROCEDURE — 97110 THERAPEUTIC EXERCISES: CPT | Mod: PN

## 2021-08-05 ENCOUNTER — CLINICAL SUPPORT (OUTPATIENT)
Dept: REHABILITATION | Facility: HOSPITAL | Age: 56
End: 2021-08-05
Payer: MEDICAID

## 2021-08-05 DIAGNOSIS — R53.1 DECREASED STRENGTH: ICD-10-CM

## 2021-08-05 DIAGNOSIS — M53.86 DECREASED ROM OF INTERVERTEBRAL DISCS OF LUMBAR SPINE: ICD-10-CM

## 2021-08-05 DIAGNOSIS — Z74.09 DECREASED MOBILITY AND ENDURANCE: ICD-10-CM

## 2021-08-05 PROCEDURE — 97110 THERAPEUTIC EXERCISES: CPT | Mod: PN,CQ

## 2021-08-10 ENCOUNTER — CLINICAL SUPPORT (OUTPATIENT)
Dept: REHABILITATION | Facility: HOSPITAL | Age: 56
End: 2021-08-10
Payer: MEDICAID

## 2021-08-10 DIAGNOSIS — R53.1 DECREASED STRENGTH: ICD-10-CM

## 2021-08-10 DIAGNOSIS — Z74.09 DECREASED MOBILITY AND ENDURANCE: ICD-10-CM

## 2021-08-10 DIAGNOSIS — M53.86 DECREASED ROM OF INTERVERTEBRAL DISCS OF LUMBAR SPINE: ICD-10-CM

## 2021-08-10 PROCEDURE — 97110 THERAPEUTIC EXERCISES: CPT | Mod: PN,CQ

## 2021-08-19 ENCOUNTER — CLINICAL SUPPORT (OUTPATIENT)
Dept: URGENT CARE | Facility: CLINIC | Age: 56
End: 2021-08-19
Payer: MEDICAID

## 2021-08-19 DIAGNOSIS — Z78.9 NO KNOWN HEALTH PROBLEMS: Primary | ICD-10-CM

## 2021-08-19 LAB
CTP QC/QA: YES
SARS-COV-2 RDRP RESP QL NAA+PROBE: NEGATIVE

## 2021-08-19 PROCEDURE — 87635 SARS-COV-2 COVID-19 AMP PRB: CPT | Mod: QW,S$GLB,, | Performed by: FAMILY MEDICINE

## 2021-08-19 PROCEDURE — 87635: ICD-10-PCS | Mod: QW,S$GLB,, | Performed by: FAMILY MEDICINE

## 2021-08-19 PROCEDURE — 99211 OFF/OP EST MAY X REQ PHY/QHP: CPT | Mod: S$GLB,,, | Performed by: FAMILY MEDICINE

## 2021-08-19 PROCEDURE — 99211 PR OFFICE/OUTPT VISIT, EST, LEVL I: ICD-10-PCS | Mod: S$GLB,,, | Performed by: FAMILY MEDICINE

## 2021-08-23 ENCOUNTER — CLINICAL SUPPORT (OUTPATIENT)
Dept: REHABILITATION | Facility: HOSPITAL | Age: 56
End: 2021-08-23
Payer: MEDICAID

## 2021-08-23 DIAGNOSIS — Z74.09 DECREASED MOBILITY AND ENDURANCE: ICD-10-CM

## 2021-08-23 DIAGNOSIS — R53.1 DECREASED STRENGTH: ICD-10-CM

## 2021-08-23 DIAGNOSIS — M53.86 DECREASED ROM OF INTERVERTEBRAL DISCS OF LUMBAR SPINE: ICD-10-CM

## 2021-08-23 PROCEDURE — 97110 THERAPEUTIC EXERCISES: CPT | Mod: PN | Performed by: PHYSICAL THERAPIST

## 2021-08-24 ENCOUNTER — CLINICAL SUPPORT (OUTPATIENT)
Dept: REHABILITATION | Facility: HOSPITAL | Age: 56
End: 2021-08-24
Payer: MEDICAID

## 2021-08-24 DIAGNOSIS — Z74.09 DECREASED MOBILITY AND ENDURANCE: ICD-10-CM

## 2021-08-24 DIAGNOSIS — M53.86 DECREASED ROM OF INTERVERTEBRAL DISCS OF LUMBAR SPINE: ICD-10-CM

## 2021-08-24 DIAGNOSIS — R53.1 DECREASED STRENGTH: ICD-10-CM

## 2021-08-24 PROCEDURE — 97110 THERAPEUTIC EXERCISES: CPT | Mod: PN

## 2021-10-04 DIAGNOSIS — E11.9 TYPE 2 DIABETES MELLITUS WITHOUT COMPLICATION, WITHOUT LONG-TERM CURRENT USE OF INSULIN: ICD-10-CM

## 2021-10-04 DIAGNOSIS — D68.59 PROTEIN C DEFICIENCY: ICD-10-CM

## 2021-10-07 DIAGNOSIS — D68.59 PROTEIN C DEFICIENCY: ICD-10-CM

## 2021-10-08 DIAGNOSIS — D68.59 PROTEIN C DEFICIENCY: ICD-10-CM

## 2021-10-11 DIAGNOSIS — E11.9 TYPE 2 DIABETES MELLITUS WITHOUT COMPLICATION, WITHOUT LONG-TERM CURRENT USE OF INSULIN: ICD-10-CM

## 2021-10-19 ENCOUNTER — OFFICE VISIT (OUTPATIENT)
Dept: FAMILY MEDICINE | Facility: HOSPITAL | Age: 56
End: 2021-10-19
Attending: SPECIALIST
Payer: MEDICAID

## 2021-10-19 VITALS — DIASTOLIC BLOOD PRESSURE: 78 MMHG | HEART RATE: 82 BPM | SYSTOLIC BLOOD PRESSURE: 167 MMHG

## 2021-10-19 DIAGNOSIS — R06.02 SHORTNESS OF BREATH: ICD-10-CM

## 2021-10-19 DIAGNOSIS — E78.1 HYPERTRIGLYCERIDEMIA: ICD-10-CM

## 2021-10-19 DIAGNOSIS — M32.8 OTHER FORMS OF SYSTEMIC LUPUS ERYTHEMATOSUS, UNSPECIFIED ORGAN INVOLVEMENT STATUS: ICD-10-CM

## 2021-10-19 DIAGNOSIS — I10 ESSENTIAL HYPERTENSION: ICD-10-CM

## 2021-10-19 DIAGNOSIS — M25.522 LEFT ELBOW PAIN: ICD-10-CM

## 2021-10-19 DIAGNOSIS — I65.23 BILATERAL CAROTID ARTERY STENOSIS: ICD-10-CM

## 2021-10-19 DIAGNOSIS — R05.3 CHRONIC COUGH: Primary | ICD-10-CM

## 2021-10-19 PROCEDURE — 99214 OFFICE O/P EST MOD 30 MIN: CPT | Performed by: STUDENT IN AN ORGANIZED HEALTH CARE EDUCATION/TRAINING PROGRAM

## 2021-10-19 RX ORDER — LOSARTAN POTASSIUM 100 MG/1
100 TABLET ORAL DAILY
Qty: 90 TABLET | Refills: 1 | Status: SHIPPED | OUTPATIENT
Start: 2021-10-19 | End: 2022-05-18 | Stop reason: SDUPTHER

## 2021-10-19 RX ORDER — DICLOFENAC SODIUM 10 MG/G
2 GEL TOPICAL 4 TIMES DAILY PRN
Qty: 100 G | Refills: 0 | Status: SHIPPED | OUTPATIENT
Start: 2021-10-19 | End: 2022-11-07

## 2021-10-19 RX ORDER — HYDROXYCHLOROQUINE SULFATE 200 MG/1
200 TABLET, FILM COATED ORAL 2 TIMES DAILY
Qty: 180 TABLET | Refills: 1 | Status: SHIPPED | OUTPATIENT
Start: 2021-10-19 | End: 2022-05-06 | Stop reason: SDUPTHER

## 2021-10-19 RX ORDER — LEVOCETIRIZINE DIHYDROCHLORIDE 5 MG/1
5 TABLET, FILM COATED ORAL NIGHTLY
Qty: 30 TABLET | Refills: 11 | Status: SHIPPED | OUTPATIENT
Start: 2021-10-19 | End: 2022-08-02 | Stop reason: ALTCHOICE

## 2021-10-19 RX ORDER — ATORVASTATIN CALCIUM 40 MG/1
40 TABLET, FILM COATED ORAL DAILY
Qty: 90 TABLET | Refills: 3 | Status: SHIPPED | OUTPATIENT
Start: 2021-10-19 | End: 2022-11-07 | Stop reason: SDUPTHER

## 2021-10-29 ENCOUNTER — OFFICE VISIT (OUTPATIENT)
Dept: URGENT CARE | Facility: CLINIC | Age: 56
End: 2021-10-29
Payer: MEDICAID

## 2021-10-29 VITALS
OXYGEN SATURATION: 95 % | SYSTOLIC BLOOD PRESSURE: 159 MMHG | DIASTOLIC BLOOD PRESSURE: 78 MMHG | TEMPERATURE: 98 F | HEART RATE: 85 BPM | BODY MASS INDEX: 23.92 KG/M2 | HEIGHT: 62 IN | WEIGHT: 130 LBS

## 2021-10-29 DIAGNOSIS — R50.9 FEVER, UNSPECIFIED FEVER CAUSE: ICD-10-CM

## 2021-10-29 DIAGNOSIS — J01.90 ACUTE BACTERIAL SINUSITIS: Primary | ICD-10-CM

## 2021-10-29 DIAGNOSIS — B96.89 ACUTE BACTERIAL SINUSITIS: Primary | ICD-10-CM

## 2021-10-29 LAB
CTP QC/QA: YES
SARS-COV-2 RDRP RESP QL NAA+PROBE: NEGATIVE

## 2021-10-29 PROCEDURE — 99213 PR OFFICE/OUTPT VISIT, EST, LEVL III, 20-29 MIN: ICD-10-PCS | Mod: S$GLB,CS,, | Performed by: PHYSICIAN ASSISTANT

## 2021-10-29 PROCEDURE — 87635 SARS-COV-2 COVID-19 AMP PRB: CPT | Mod: QW,S$GLB,, | Performed by: PHYSICIAN ASSISTANT

## 2021-10-29 PROCEDURE — 87635: ICD-10-PCS | Mod: QW,S$GLB,, | Performed by: PHYSICIAN ASSISTANT

## 2021-10-29 PROCEDURE — 99213 OFFICE O/P EST LOW 20 MIN: CPT | Mod: S$GLB,CS,, | Performed by: PHYSICIAN ASSISTANT

## 2021-10-29 RX ORDER — PROMETHAZINE HYDROCHLORIDE AND DEXTROMETHORPHAN HYDROBROMIDE 6.25; 15 MG/5ML; MG/5ML
5 SYRUP ORAL EVERY 8 HOURS PRN
Qty: 118 ML | Refills: 0 | Status: SHIPPED | OUTPATIENT
Start: 2021-10-29 | End: 2021-11-05

## 2021-10-29 RX ORDER — AMOXICILLIN AND CLAVULANATE POTASSIUM 875; 125 MG/1; MG/1
1 TABLET, FILM COATED ORAL 2 TIMES DAILY
Qty: 14 TABLET | Refills: 0 | Status: SHIPPED | OUTPATIENT
Start: 2021-10-29 | End: 2021-11-05

## 2021-10-29 RX ORDER — ALBUTEROL SULFATE 90 UG/1
2 AEROSOL, METERED RESPIRATORY (INHALATION) EVERY 6 HOURS PRN
Qty: 18 G | Refills: 0 | Status: SHIPPED | OUTPATIENT
Start: 2021-10-29 | End: 2022-11-07

## 2021-11-11 DIAGNOSIS — I10 ESSENTIAL HYPERTENSION: ICD-10-CM

## 2021-11-11 RX ORDER — METOPROLOL SUCCINATE 50 MG/1
50 TABLET, EXTENDED RELEASE ORAL DAILY
Qty: 30 TABLET | Refills: 12 | Status: SHIPPED | OUTPATIENT
Start: 2021-11-11 | End: 2022-12-08

## 2021-11-12 ENCOUNTER — HOSPITAL ENCOUNTER (OUTPATIENT)
Dept: PULMONOLOGY | Facility: HOSPITAL | Age: 56
Discharge: HOME | End: 2021-11-12
Attending: OTOLARYNGOLOGY
Payer: MEDICAID

## 2021-11-12 DIAGNOSIS — R06.02 SHORTNESS OF BREATH: ICD-10-CM

## 2021-11-12 DIAGNOSIS — R05.3 CHRONIC COUGH: ICD-10-CM

## 2021-11-12 PROCEDURE — 94729 DIFFUSING CAPACITY: CPT

## 2021-11-12 PROCEDURE — 94010 BREATHING CAPACITY TEST: CPT

## 2021-11-12 PROCEDURE — 94727 GAS DIL/WSHOT DETER LNG VOL: CPT

## 2021-12-03 DIAGNOSIS — I10 ESSENTIAL HYPERTENSION: ICD-10-CM

## 2021-12-03 RX ORDER — HYDRALAZINE HYDROCHLORIDE 50 MG/1
50 TABLET, FILM COATED ORAL 3 TIMES DAILY
Qty: 90 TABLET | Refills: 11 | Status: SHIPPED | OUTPATIENT
Start: 2021-12-03 | End: 2022-11-07 | Stop reason: SDUPTHER

## 2021-12-14 DIAGNOSIS — R06.02 SOB (SHORTNESS OF BREATH): Primary | ICD-10-CM

## 2021-12-15 ENCOUNTER — HOSPITAL ENCOUNTER (OUTPATIENT)
Dept: PULMONOLOGY | Facility: HOSPITAL | Age: 56
Discharge: HOME | End: 2021-12-15
Attending: STUDENT IN AN ORGANIZED HEALTH CARE EDUCATION/TRAINING PROGRAM
Payer: MEDICAID

## 2021-12-15 DIAGNOSIS — R06.02 SOB (SHORTNESS OF BREATH): ICD-10-CM

## 2021-12-15 PROCEDURE — 94727 GAS DIL/WSHOT DETER LNG VOL: CPT

## 2021-12-15 PROCEDURE — 94010 BREATHING CAPACITY TEST: CPT

## 2021-12-15 PROCEDURE — 94729 DIFFUSING CAPACITY: CPT

## 2021-12-16 LAB
BRPFT: ABNORMAL
DLCO ADJ PRE: 11.6 ML/(MIN*MMHG) (ref 15.95–27.41)
DLCO SINGLE BREATH LLN: 15.95
DLCO SINGLE BREATH PRE REF: 53.5 %
DLCO SINGLE BREATH REF: 21.68
DLCOC SBVA LLN: 3.12
DLCOC SBVA PRE REF: 62.6 %
DLCOC SBVA REF: 4.74
DLCOC SINGLE BREATH LLN: 15.95
DLCOC SINGLE BREATH PRE REF: 53.5 %
DLCOC SINGLE BREATH REF: 21.68
DLCOVA LLN: 3.12
DLCOVA PRE REF: 62.6 %
DLCOVA PRE: 2.97 ML/(MIN*MMHG*L) (ref 3.12–6.36)
DLCOVA REF: 4.74
DLVAADJ PRE: 2.97 ML/(MIN*MMHG*L) (ref 3.12–6.36)
ERVN2 LLN: -16449.19
ERVN2 PRE REF: 72.5 %
ERVN2 PRE: 0.58 L (ref -16449.19–16450.81)
ERVN2 REF: 0.81
FEF 25 75 LLN: 1.16
FEF 25 75 PRE REF: 63.8 %
FEF 25 75 REF: 2.23
FEV1 FVC LLN: 68
FEV1 FVC PRE REF: 91.3 %
FEV1 FVC REF: 80
FEV1 LLN: 1.8
FEV1 PRE REF: 81.7 %
FEV1 REF: 2.36
FRCN2 LLN: 1.75
FRCN2 PRE REF: 87.9 %
FRCN2 REF: 2.57
FVC LLN: 2.27
FVC PRE REF: 89 %
FVC REF: 2.97
IVC PRE: 2.38 L (ref 2.27–3.68)
IVC SINGLE BREATH LLN: 2.27
IVC SINGLE BREATH PRE REF: 79.9 %
IVC SINGLE BREATH REF: 2.97
PEF LLN: 4.47
PEF PRE REF: 68.1 %
PEF REF: 6.07
PRE DLCO: 11.6 ML/(MIN*MMHG) (ref 15.95–27.41)
PRE FEF 25 75: 1.43 L/S (ref 1.16–3.3)
PRE FET 100: 6.81 SEC
PRE FEV1 FVC: 72.86 % (ref 67.95–91.71)
PRE FEV1: 1.93 L (ref 1.8–2.93)
PRE FRC N2: 2.26 L
PRE FVC: 2.65 L (ref 2.27–3.68)
PRE PEF: 4.13 L/S (ref 4.47–7.66)
RVN2 LLN: 1.19
RVN2 PRE REF: 83.8 %
RVN2 PRE: 1.48 L (ref 1.19–2.35)
RVN2 REF: 1.77
RVN2TLCN2 LLN: 29.09
RVN2TLCN2 PRE REF: 92.8 %
RVN2TLCN2 PRE: 35.89 % (ref 29.09–48.27)
RVN2TLCN2 REF: 38.68
TLCN2 LLN: 3.58
TLCN2 PRE REF: 90.4 %
TLCN2 PRE: 4.13 L (ref 3.58–5.56)
TLCN2 REF: 4.57
VA PRE: 3.91 L (ref 4.42–4.42)
VA SINGLE BREATH LLN: 4.42
VA SINGLE BREATH PRE REF: 88.4 %
VA SINGLE BREATH REF: 4.42
VCMAXN2 LLN: 2.27
VCMAXN2 PRE REF: 89 %
VCMAXN2 PRE: 2.65 L (ref 2.27–3.68)
VCMAXN2 REF: 2.97

## 2022-01-25 DIAGNOSIS — I10 ESSENTIAL HYPERTENSION: ICD-10-CM

## 2022-01-25 RX ORDER — NIFEDIPINE 60 MG/1
60 TABLET, EXTENDED RELEASE ORAL DAILY
Qty: 90 TABLET | Refills: 1 | Status: SHIPPED | OUTPATIENT
Start: 2022-01-25 | End: 2022-08-02 | Stop reason: SDUPTHER

## 2022-01-25 NOTE — TELEPHONE ENCOUNTER
----- Message from Mirlande Zambrano sent at 1/25/2022  3:04 PM CST -----  Pt called for refill  NIFEdipine (PROCARDIA-XL) 60 MG (OSM) 24 hr tablet

## 2022-03-18 DIAGNOSIS — G62.9 PERIPHERAL POLYNEUROPATHY: ICD-10-CM

## 2022-03-18 RX ORDER — GABAPENTIN 600 MG/1
1200 TABLET ORAL 3 TIMES DAILY
Qty: 180 TABLET | Refills: 11 | Status: SHIPPED | OUTPATIENT
Start: 2022-03-18 | End: 2022-07-07 | Stop reason: SDUPTHER

## 2022-03-21 ENCOUNTER — OFFICE VISIT (OUTPATIENT)
Dept: FAMILY MEDICINE | Facility: HOSPITAL | Age: 57
End: 2022-03-21
Attending: FAMILY MEDICINE
Payer: MEDICAID

## 2022-03-21 ENCOUNTER — LAB VISIT (OUTPATIENT)
Dept: LAB | Facility: HOSPITAL | Age: 57
End: 2022-03-21
Attending: FAMILY MEDICINE
Payer: MEDICAID

## 2022-03-21 VITALS
BODY MASS INDEX: 25.64 KG/M2 | WEIGHT: 139.31 LBS | HEART RATE: 78 BPM | HEIGHT: 62 IN | SYSTOLIC BLOOD PRESSURE: 131 MMHG | DIASTOLIC BLOOD PRESSURE: 85 MMHG

## 2022-03-21 DIAGNOSIS — Z12.2 SCREENING FOR LUNG CANCER: ICD-10-CM

## 2022-03-21 DIAGNOSIS — Z12.39 ENCOUNTER FOR SCREENING FOR MALIGNANT NEOPLASM OF BREAST, UNSPECIFIED SCREENING MODALITY: ICD-10-CM

## 2022-03-21 DIAGNOSIS — E11.9 TYPE 2 DIABETES MELLITUS WITHOUT COMPLICATION, WITHOUT LONG-TERM CURRENT USE OF INSULIN: ICD-10-CM

## 2022-03-21 DIAGNOSIS — M32.9 SYSTEMIC LUPUS ERYTHEMATOSUS, UNSPECIFIED SLE TYPE, UNSPECIFIED ORGAN INVOLVEMENT STATUS: ICD-10-CM

## 2022-03-21 DIAGNOSIS — R53.83 FATIGUE, UNSPECIFIED TYPE: ICD-10-CM

## 2022-03-21 DIAGNOSIS — N18.32 STAGE 3B CHRONIC KIDNEY DISEASE: ICD-10-CM

## 2022-03-21 DIAGNOSIS — G47.00 INSOMNIA, UNSPECIFIED TYPE: Primary | ICD-10-CM

## 2022-03-21 LAB
ALBUMIN SERPL BCP-MCNC: 3.8 G/DL (ref 3.5–5.2)
ALP SERPL-CCNC: 98 U/L (ref 55–135)
ALT SERPL W/O P-5'-P-CCNC: 15 U/L (ref 10–44)
ANION GAP SERPL CALC-SCNC: 8 MMOL/L (ref 8–16)
AST SERPL-CCNC: 21 U/L (ref 10–40)
BASOPHILS # BLD AUTO: 0.02 K/UL (ref 0–0.2)
BASOPHILS NFR BLD: 0.5 % (ref 0–1.9)
BILIRUB SERPL-MCNC: 0.3 MG/DL (ref 0.1–1)
BUN SERPL-MCNC: 22 MG/DL (ref 6–20)
CALCIUM SERPL-MCNC: 9 MG/DL (ref 8.7–10.5)
CHLORIDE SERPL-SCNC: 110 MMOL/L (ref 95–110)
CO2 SERPL-SCNC: 21 MMOL/L (ref 23–29)
CREAT SERPL-MCNC: 1.5 MG/DL (ref 0.5–1.4)
DIFFERENTIAL METHOD: ABNORMAL
EOSINOPHIL # BLD AUTO: 0.1 K/UL (ref 0–0.5)
EOSINOPHIL NFR BLD: 2.1 % (ref 0–8)
ERYTHROCYTE [DISTWIDTH] IN BLOOD BY AUTOMATED COUNT: 13.3 % (ref 11.5–14.5)
EST. GFR  (AFRICAN AMERICAN): 44 ML/MIN/1.73 M^2
EST. GFR  (NON AFRICAN AMERICAN): 38 ML/MIN/1.73 M^2
ESTIMATED AVG GLUCOSE: 117 MG/DL (ref 68–131)
GLUCOSE SERPL-MCNC: 107 MG/DL (ref 70–110)
HBA1C MFR BLD: 5.7 % (ref 4–5.6)
HCT VFR BLD AUTO: 36.3 % (ref 37–48.5)
HGB BLD-MCNC: 11.3 G/DL (ref 12–16)
IMM GRANULOCYTES # BLD AUTO: 0.01 K/UL (ref 0–0.04)
IMM GRANULOCYTES NFR BLD AUTO: 0.3 % (ref 0–0.5)
LYMPHOCYTES # BLD AUTO: 0.9 K/UL (ref 1–4.8)
LYMPHOCYTES NFR BLD: 23.5 % (ref 18–48)
MCH RBC QN AUTO: 28.1 PG (ref 27–31)
MCHC RBC AUTO-ENTMCNC: 31.1 G/DL (ref 32–36)
MCV RBC AUTO: 90 FL (ref 82–98)
MONOCYTES # BLD AUTO: 0.3 K/UL (ref 0.3–1)
MONOCYTES NFR BLD: 6.5 % (ref 4–15)
NEUTROPHILS # BLD AUTO: 2.6 K/UL (ref 1.8–7.7)
NEUTROPHILS NFR BLD: 67.1 % (ref 38–73)
NRBC BLD-RTO: 0 /100 WBC
PLATELET # BLD AUTO: 120 K/UL (ref 150–450)
PMV BLD AUTO: 11.8 FL (ref 9.2–12.9)
POTASSIUM SERPL-SCNC: 4.5 MMOL/L (ref 3.5–5.1)
PROT SERPL-MCNC: 7.2 G/DL (ref 6–8.4)
RBC # BLD AUTO: 4.02 M/UL (ref 4–5.4)
SODIUM SERPL-SCNC: 139 MMOL/L (ref 136–145)
TSH SERPL DL<=0.005 MIU/L-ACNC: 0.51 UIU/ML (ref 0.4–4)
WBC # BLD AUTO: 3.87 K/UL (ref 3.9–12.7)

## 2022-03-21 PROCEDURE — 93010 EKG 12-LEAD: ICD-10-PCS | Mod: ,,, | Performed by: INTERNAL MEDICINE

## 2022-03-21 PROCEDURE — 84443 ASSAY THYROID STIM HORMONE: CPT | Performed by: STUDENT IN AN ORGANIZED HEALTH CARE EDUCATION/TRAINING PROGRAM

## 2022-03-21 PROCEDURE — 83036 HEMOGLOBIN GLYCOSYLATED A1C: CPT | Performed by: STUDENT IN AN ORGANIZED HEALTH CARE EDUCATION/TRAINING PROGRAM

## 2022-03-21 PROCEDURE — 99215 OFFICE O/P EST HI 40 MIN: CPT | Performed by: STUDENT IN AN ORGANIZED HEALTH CARE EDUCATION/TRAINING PROGRAM

## 2022-03-21 PROCEDURE — 93005 ELECTROCARDIOGRAM TRACING: CPT

## 2022-03-21 PROCEDURE — 93010 ELECTROCARDIOGRAM REPORT: CPT | Mod: ,,, | Performed by: INTERNAL MEDICINE

## 2022-03-21 PROCEDURE — 80053 COMPREHEN METABOLIC PANEL: CPT | Performed by: STUDENT IN AN ORGANIZED HEALTH CARE EDUCATION/TRAINING PROGRAM

## 2022-03-21 PROCEDURE — 36415 COLL VENOUS BLD VENIPUNCTURE: CPT | Performed by: STUDENT IN AN ORGANIZED HEALTH CARE EDUCATION/TRAINING PROGRAM

## 2022-03-21 PROCEDURE — 85025 COMPLETE CBC W/AUTO DIFF WBC: CPT | Performed by: STUDENT IN AN ORGANIZED HEALTH CARE EDUCATION/TRAINING PROGRAM

## 2022-03-21 RX ORDER — TRAZODONE HYDROCHLORIDE 50 MG/1
50 TABLET ORAL NIGHTLY PRN
Qty: 30 TABLET | Refills: 11 | Status: SHIPPED | OUTPATIENT
Start: 2022-03-21 | End: 2022-03-22

## 2022-04-20 ENCOUNTER — HOSPITAL ENCOUNTER (OUTPATIENT)
Dept: RADIOLOGY | Facility: HOSPITAL | Age: 57
Discharge: HOME | End: 2022-04-20
Attending: STUDENT IN AN ORGANIZED HEALTH CARE EDUCATION/TRAINING PROGRAM
Payer: MEDICAID

## 2022-04-20 DIAGNOSIS — Z12.39 ENCOUNTER FOR SCREENING FOR MALIGNANT NEOPLASM OF BREAST, UNSPECIFIED SCREENING MODALITY: ICD-10-CM

## 2022-04-20 DIAGNOSIS — Z12.2 SCREENING FOR LUNG CANCER: ICD-10-CM

## 2022-04-20 PROCEDURE — 71271 CT THORAX LUNG CANCER SCR C-: CPT | Mod: TC

## 2022-04-20 PROCEDURE — 71271 CT CHEST LUNG SCREENING LOW DOSE: ICD-10-PCS | Mod: 26,,, | Performed by: RADIOLOGY

## 2022-04-20 PROCEDURE — 77067 SCR MAMMO BI INCL CAD: CPT | Mod: TC

## 2022-04-20 PROCEDURE — 77063 BREAST TOMOSYNTHESIS BI: CPT | Mod: TC

## 2022-04-20 PROCEDURE — 77067 MAMMO DIGITAL SCREENING BILAT WITH TOMO: ICD-10-PCS | Mod: 26,,, | Performed by: RADIOLOGY

## 2022-04-20 PROCEDURE — 77067 SCR MAMMO BI INCL CAD: CPT | Mod: 26,,, | Performed by: RADIOLOGY

## 2022-04-20 PROCEDURE — 77063 MAMMO DIGITAL SCREENING BILAT WITH TOMO: ICD-10-PCS | Mod: 26,,, | Performed by: RADIOLOGY

## 2022-04-20 PROCEDURE — 71271 CT THORAX LUNG CANCER SCR C-: CPT | Mod: 26,,, | Performed by: RADIOLOGY

## 2022-04-20 PROCEDURE — 77063 BREAST TOMOSYNTHESIS BI: CPT | Mod: 26,,, | Performed by: RADIOLOGY

## 2022-05-06 DIAGNOSIS — M32.8 OTHER FORMS OF SYSTEMIC LUPUS ERYTHEMATOSUS, UNSPECIFIED ORGAN INVOLVEMENT STATUS: ICD-10-CM

## 2022-05-06 RX ORDER — HYDROXYCHLOROQUINE SULFATE 200 MG/1
200 TABLET, FILM COATED ORAL 2 TIMES DAILY
Qty: 180 TABLET | Refills: 1 | Status: SHIPPED | OUTPATIENT
Start: 2022-05-06 | End: 2022-11-07 | Stop reason: SDUPTHER

## 2022-05-13 NOTE — PROGRESS NOTES
"Progress Note  [facility]      Chief Complaint:   Chief Complaint   Patient presents with    check up    Medication Refill        Subjective:    Aggie Rasheed is a 56 y.o.female with a pertinent history of T2DM and HTN who is following up for     General check up  -reports fatigue/malaise that is chronic  -has gained weight since 2020 due to poor dietary habits    SLE  -is on hydroxychloroquine chronically  -does not see rheum  -reports stable disease    Is concerned she may have prediabetes again    Due for mammogram    Smokes atleast 1PPD x 40 years, never had screening for lung cancer  -is interesting in lung cancer screening  -reports FH of lung cancer in father    Review of Systems    Review of Systems   Constitutional: Positive for malaise/fatigue. Negative for chills and weight loss.   Eyes: Negative for blurred vision.   Respiratory: Negative for cough, hemoptysis, sputum production and shortness of breath.    Cardiovascular: Negative for chest pain, palpitations, orthopnea, claudication and leg swelling.   Gastrointestinal: Negative for abdominal pain, blood in stool, constipation, diarrhea, heartburn, melena, nausea and vomiting.   Genitourinary: Negative for dysuria, hematuria and urgency.   Skin: Negative for itching and rash.   Neurological: Negative for dizziness, tingling, weakness and headaches.   Psychiatric/Behavioral: Negative for depression, substance abuse and suicidal ideas. The patient has insomnia. The patient is not nervous/anxious.         Past medical, past surgical, social, and family history reviewed.    Objective:    /85 (BP Location: Left arm)   Pulse 78   Ht 5' 2" (1.575 m)   Wt 63.2 kg (139 lb 5.3 oz)   LMP  (LMP Unknown)   BMI 25.48 kg/m²     Physical Exam:  Physical Exam  Vitals and nursing note reviewed.   Constitutional:       General: She is not in acute distress.     Appearance: She is not diaphoretic.   HENT:      Head: Normocephalic and atraumatic. "   Cardiovascular:      Rate and Rhythm: Normal rate and regular rhythm.      Heart sounds: No murmur heard.    No friction rub. No gallop.   Pulmonary:      Effort: Pulmonary effort is normal. No respiratory distress.      Breath sounds: Normal breath sounds. No wheezing or rales.   Chest:      Chest wall: No tenderness.   Abdominal:      General: Bowel sounds are normal. There is no distension.      Palpations: Abdomen is soft.      Tenderness: There is no abdominal tenderness.   Skin:     General: Skin is warm and dry.      Findings: Rash (erythema noted on face around mask and upper chest) present. No erythema.   Neurological:      Mental Status: She is alert and oriented to person, place, and time.   Psychiatric:         Mood and Affect: Mood and affect normal.         Cognition and Memory: Memory normal.         Laboratory:    Reviewed labs and imaging.      Assessment Plan    1. Fatigue, unspecified type  Chronic, however is worsening  - CBC Auto Differential; Future  - TSH; Future    2. Encounter for screening for malignant neoplasm of breast, unspecified screening modality  - Mammo Digital Screening Bilat; Future    3. Type 2 diabetes mellitus without complication, without long-term current use of insulin  - Comprehensive Metabolic Panel; Future  - Microalbumin/creatinine urine ratio; Future  - Hemoglobin A1C; Future    4. Screening for lung cancer  Pt with >40pk year history, no b symptoms. Currently smokes. Pt would like lung cancer screening.   - CT Chest Lung Screening Low Dose; Future    5. Insomnia, unspecified type  -is already taking 10mg melatonin nightly, has issues staying asleep. Counseled on proper sleep hygiene. Will check QTc. Risks and benefits of therapy discussed. Return precautions given.   - traZODone (DESYREL) 50 MG tablet; Take 1 tablet (50 mg total) by mouth nightly as needed for Insomnia. May take 2 pills(100mg) nightly if 1 pill does not work.  Dispense: 30 tablet; Refill: 11    6.  Systemic lupus erythematosus, unspecified SLE type, unspecified organ involvement status  Reported stable on hydroxychloroquine, would benefit from disease management by a rheumatologist.   - EKG 12-lead; Future  - Ambulatory referral/consult to Rheumatology; Future    Labs reviewed, pt has prediabetes and CKD. Would benefit from nephrologist eval due to comorbid lupus. Referral placed.   Pt's EKG with QTc of >520, called patient and informed her to not start trazodone and rheum referral made urgent so patient can be seen sooner for other potential therapies. Discussed this with patient and referrals coordinator Minerva. Return precautions given to patient. This is likely due to her long term hydroxychloroquine use.     Follow Up:   1-2 weeks    The patient's diagnosis and medications were discussed.    I will review labs and notify patient with results either by mail or contact by phone.      03/21/2022  Cheng Foy M.D.  [facility] PGY-3    *This note was dictated using the M*Modal Fluency Direct word recognition program. There are word recognition mistakes that are occasionally missed on review.        no vaginal discharge/no spotting/no abnormal vaginal bleeding/no pelvic pain

## 2022-05-18 DIAGNOSIS — I10 ESSENTIAL HYPERTENSION: ICD-10-CM

## 2022-05-18 RX ORDER — LOSARTAN POTASSIUM 100 MG/1
100 TABLET ORAL DAILY
Qty: 90 TABLET | Refills: 1 | Status: SHIPPED | OUTPATIENT
Start: 2022-05-18 | End: 2022-11-07 | Stop reason: SDUPTHER

## 2022-05-26 ENCOUNTER — OFFICE VISIT (OUTPATIENT)
Dept: RHEUMATOLOGY | Facility: CLINIC | Age: 57
End: 2022-05-26
Payer: MEDICAID

## 2022-05-26 ENCOUNTER — HOSPITAL ENCOUNTER (OUTPATIENT)
Dept: RADIOLOGY | Facility: HOSPITAL | Age: 57
Discharge: HOME | End: 2022-05-26
Attending: INTERNAL MEDICINE
Payer: MEDICAID

## 2022-05-26 VITALS
BODY MASS INDEX: 25.98 KG/M2 | OXYGEN SATURATION: 98 % | RESPIRATION RATE: 20 BRPM | WEIGHT: 141.19 LBS | HEART RATE: 82 BPM | HEIGHT: 62 IN | DIASTOLIC BLOOD PRESSURE: 71 MMHG | SYSTOLIC BLOOD PRESSURE: 143 MMHG

## 2022-05-26 DIAGNOSIS — M32.9 SYSTEMIC LUPUS ERYTHEMATOSUS, UNSPECIFIED SLE TYPE, UNSPECIFIED ORGAN INVOLVEMENT STATUS: Primary | ICD-10-CM

## 2022-05-26 DIAGNOSIS — Z71.89 COUNSELING AND COORDINATION OF CARE: ICD-10-CM

## 2022-05-26 DIAGNOSIS — M32.9 SYSTEMIC LUPUS ERYTHEMATOSUS, UNSPECIFIED SLE TYPE, UNSPECIFIED ORGAN INVOLVEMENT STATUS: ICD-10-CM

## 2022-05-26 DIAGNOSIS — Z79.899 ENCOUNTER FOR LONG-TERM (CURRENT) USE OF OTHER MEDICATIONS: ICD-10-CM

## 2022-05-26 DIAGNOSIS — M15.9 PRIMARY OSTEOARTHRITIS INVOLVING MULTIPLE JOINTS: ICD-10-CM

## 2022-05-26 DIAGNOSIS — D68.61 APS (ANTIPHOSPHOLIPID SYNDROME): ICD-10-CM

## 2022-05-26 PROCEDURE — 3061F NEG MICROALBUMINURIA REV: CPT | Mod: CPTII,,, | Performed by: INTERNAL MEDICINE

## 2022-05-26 PROCEDURE — 77077 JOINT SURVEY SINGLE VIEW: CPT | Mod: TC

## 2022-05-26 PROCEDURE — 20610 LARGE JOINT ASPIRATION/INJECTION: R KNEE: ICD-10-PCS | Mod: 50,S$PBB,, | Performed by: INTERNAL MEDICINE

## 2022-05-26 PROCEDURE — 3061F PR NEG MICROALBUMINURIA RESULT DOCUMENTED/REVIEW: ICD-10-PCS | Mod: CPTII,,, | Performed by: INTERNAL MEDICINE

## 2022-05-26 PROCEDURE — 4010F PR ACE/ARB THEARPY RXD/TAKEN: ICD-10-PCS | Mod: CPTII,,, | Performed by: INTERNAL MEDICINE

## 2022-05-26 PROCEDURE — 3066F NEPHROPATHY DOC TX: CPT | Mod: CPTII,,, | Performed by: INTERNAL MEDICINE

## 2022-05-26 PROCEDURE — 77077 XR ARTHRITIS SURVEY: ICD-10-PCS | Mod: 26,,, | Performed by: RADIOLOGY

## 2022-05-26 PROCEDURE — 99214 OFFICE O/P EST MOD 30 MIN: CPT | Mod: PBBFAC,PO | Performed by: INTERNAL MEDICINE

## 2022-05-26 PROCEDURE — 99204 OFFICE O/P NEW MOD 45 MIN: CPT | Mod: 25,S$PBB,, | Performed by: INTERNAL MEDICINE

## 2022-05-26 PROCEDURE — 3066F PR DOCUMENTATION OF TREATMENT FOR NEPHROPATHY: ICD-10-PCS | Mod: CPTII,,, | Performed by: INTERNAL MEDICINE

## 2022-05-26 PROCEDURE — 77077 JOINT SURVEY SINGLE VIEW: CPT | Mod: 26,,, | Performed by: RADIOLOGY

## 2022-05-26 PROCEDURE — 1159F MED LIST DOCD IN RCRD: CPT | Mod: CPTII,,, | Performed by: INTERNAL MEDICINE

## 2022-05-26 PROCEDURE — 3044F PR MOST RECENT HEMOGLOBIN A1C LEVEL <7.0%: ICD-10-PCS | Mod: CPTII,,, | Performed by: INTERNAL MEDICINE

## 2022-05-26 PROCEDURE — 3044F HG A1C LEVEL LT 7.0%: CPT | Mod: CPTII,,, | Performed by: INTERNAL MEDICINE

## 2022-05-26 PROCEDURE — 1159F PR MEDICATION LIST DOCUMENTED IN MEDICAL RECORD: ICD-10-PCS | Mod: CPTII,,, | Performed by: INTERNAL MEDICINE

## 2022-05-26 PROCEDURE — 99999 PR PBB SHADOW E&M-EST. PATIENT-LVL IV: CPT | Mod: PBBFAC,,, | Performed by: INTERNAL MEDICINE

## 2022-05-26 PROCEDURE — 3008F BODY MASS INDEX DOCD: CPT | Mod: CPTII,,, | Performed by: INTERNAL MEDICINE

## 2022-05-26 PROCEDURE — 20610 DRAIN/INJ JOINT/BURSA W/O US: CPT | Mod: PBBFAC,PO | Performed by: INTERNAL MEDICINE

## 2022-05-26 PROCEDURE — 99204 PR OFFICE/OUTPT VISIT, NEW, LEVL IV, 45-59 MIN: ICD-10-PCS | Mod: 25,S$PBB,, | Performed by: INTERNAL MEDICINE

## 2022-05-26 PROCEDURE — 3008F PR BODY MASS INDEX (BMI) DOCUMENTED: ICD-10-PCS | Mod: CPTII,,, | Performed by: INTERNAL MEDICINE

## 2022-05-26 PROCEDURE — 99999 PR PBB SHADOW E&M-EST. PATIENT-LVL IV: ICD-10-PCS | Mod: PBBFAC,,, | Performed by: INTERNAL MEDICINE

## 2022-05-26 PROCEDURE — 4010F ACE/ARB THERAPY RXD/TAKEN: CPT | Mod: CPTII,,, | Performed by: INTERNAL MEDICINE

## 2022-05-26 RX ORDER — TRIAMCINOLONE ACETONIDE 40 MG/ML
40 INJECTION, SUSPENSION INTRA-ARTICULAR; INTRAMUSCULAR
Status: DISCONTINUED | OUTPATIENT
Start: 2022-05-26 | End: 2022-05-26 | Stop reason: HOSPADM

## 2022-05-26 RX ORDER — LIDOCAINE HYDROCHLORIDE 10 MG/ML
2 INJECTION INFILTRATION; PERINEURAL
Status: DISCONTINUED | OUTPATIENT
Start: 2022-05-26 | End: 2022-05-26 | Stop reason: HOSPADM

## 2022-05-26 RX ADMIN — TRIAMCINOLONE ACETONIDE 40 MG: 40 INJECTION, SUSPENSION INTRA-ARTICULAR; INTRAMUSCULAR at 10:05

## 2022-05-26 RX ADMIN — LIDOCAINE HYDROCHLORIDE 2 ML: 10 INJECTION, SOLUTION INFILTRATION; PERINEURAL at 10:05

## 2022-05-26 NOTE — PROCEDURES
Large Joint Aspiration/Injection: R knee    Date/Time: 5/26/2022 10:30 AM  Performed by: Yasmani Chan MD  Authorized by: Yasmani Chan MD     Consent Done?:  Yes (Verbal)  Indications:  Arthritis and pain  Site marked: the procedure site was marked    Timeout: prior to procedure the correct patient, procedure, and site was verified    Prep: patient was prepped and draped in usual sterile fashion    Local anesthesia used?: No      Details:  Needle Size:  25 G  Ultrasonic Guidance for needle placement?: No    Approach:  Anterolateral  Location:  Knee  Site:  R knee  Medications:  2 mL LIDOcaine HCL 10 mg/ml (1%) 10 mg/mL (1 %); 40 mg triamcinolone acetonide 40 mg/mL  Patient tolerance:  Patient tolerated the procedure well with no immediate complications  Large Joint Aspiration/Injection: L knee    Date/Time: 5/26/2022 10:30 AM  Performed by: Yasmani Chan MD  Authorized by: Yasmani Chan MD     Consent Done?:  Yes (Verbal)  Indications:  Arthritis and pain  Site marked: the procedure site was marked    Timeout: prior to procedure the correct patient, procedure, and site was verified    Prep: patient was prepped and draped in usual sterile fashion    Local anesthesia used?: No      Details:  Needle Size:  25 G  Ultrasonic Guidance for needle placement?: No    Approach:  Anterolateral  Location:  Knee  Site:  L knee  Medications:  2 mL LIDOcaine HCL 10 mg/ml (1%) 10 mg/mL (1 %); 40 mg triamcinolone acetonide 40 mg/mL  Patient tolerance:  Patient tolerated the procedure well with no immediate complications

## 2022-05-26 NOTE — PROGRESS NOTES
RHEUMATOLOGY OUTPATIENT CLINIC NOTE    5/26/2022    Attending Rheumatologist: Yasmani Chan  Primary Care Provider: Mikey Maldonado MD   Physician Requesting Consultation: Cheng Foy MD  [address]  [address]  [place] [id number]  Chief Complaint/Reason For Consultation:  No chief complaint on file.      Subjective:       HPI  Aggie Rasheed is a 56 y.o. White female with medical history noted below who presents for evaluation of SLE.     Patient presents for evaluation of SLE. Prior patient of Dr. Briggs. Diagnosed in 2009, has been on HCQ since then. Initial manifestations: arthralgias, abnormal labs. DX APS, multiple blood clots, on Xarelto. Overall has been stable. Endorses arthralgias in knees, left elbow and feet. Notes seeing a podiatrist and being told she has arthritis of the feet. Takes Tylenol minimal relief, when it gets bad she takes Ibuprofen with relief. Pain worsened by stairs. Notes prior CSI in the knees with relief. Morning stiffness lasting about an hour. No joint swelling. +Hair thinning, dry eyes, dry mouth, easy bruising, GERD, SOB (smoker), fatigue. No Oral/Nasal Ulcers, Rash, Photosensitivity, Pleuritis/serositis, LAD, Raynaud's, Miscarriages, Skin tightening, chest pain, wt loss, fever, night sweats. No FHX of CTD.     Review of Systems   Constitutional: Positive for fatigue. Negative for chills, fever and unexpected weight change.   HENT: Negative for mouth sores.    Eyes: Negative for redness and eye dryness.   Respiratory: Negative for cough and shortness of breath.    Cardiovascular: Negative for chest pain.   Gastrointestinal: Negative for abdominal distention, constipation, diarrhea, nausea and vomiting.   Genitourinary: Negative for vaginal dryness.   Musculoskeletal: Positive for arthralgias and leg pain. Negative for back pain, gait problem, joint swelling, myalgias, neck pain, neck stiffness and joint deformity.   Integumentary:  Negative for rash.    Neurological: Negative for weakness, numbness and headaches.   Hematological: Negative for adenopathy. Does not bruise/bleed easily.   Psychiatric/Behavioral: Negative for confusion, decreased concentration and sleep disturbance. The patient is not nervous/anxious.    All other systems reviewed and are negative.       Chronic comorbid conditions affecting medical decision making today:  Past Medical History:   Diagnosis Date    Diabetes mellitus type II     Headache associated with hormonal factors     Hyperlipidemia     Hypertension     Lupus     Protein C deficiency     Tachycardia      Past Surgical History:   Procedure Laterality Date    AORTIC VALVE SURGERY       SECTION      COLONOSCOPY N/A 2017    Procedure: COLONOSCOPY;  Surgeon: Markos Calvert MD;  Location: [facility] END[facility];  Service: Endoscopy;  Laterality: N/A;    TOE AMPUTATION      TONSILLECTOMY      WRIST FRACTURE SURGERY       Family History   Problem Relation Age of Onset    Hypertension Mother     Cancer Father     Diabetes Maternal Uncle     Stroke Maternal Grandfather     Colon cancer Paternal Aunt      Social History     Substance and Sexual Activity   Alcohol Use No     Social History     Tobacco Use   Smoking Status Current Every Day Smoker    Packs/day: 1.00    Years: 35.00    Pack years: 35.00    Types: Cigarettes   Smokeless Tobacco Never Used     Social History     Substance and Sexual Activity   Drug Use No       Current Outpatient Medications:     albuterol (PROVENTIL HFA) 90 mcg/actuation inhaler, Inhale 2 puffs into the lungs every 6 (six) hours as needed for Wheezing. Rescue, Disp: 18 g, Rfl: 0    atorvastatin (LIPITOR) 40 MG tablet, Take 1 tablet (40 mg total) by mouth once daily., Disp: 90 tablet, Rfl: 3    diclofenac sodium (VOLTAREN) 1 % Gel, Apply 2 g topically 4 (four) times daily as needed., Disp: 100 g, Rfl: 0    gabapentin (NEURONTIN) 600 MG tablet, Take 2 tablets (1,200 mg total) by mouth  3 (three) times daily., Disp: 180 tablet, Rfl: 11    hydrALAZINE (APRESOLINE) 50 MG tablet, Take 1 tablet (50 mg total) by mouth 3 (three) times daily., Disp: 90 tablet, Rfl: 11    hydrOXYchloroQUINE (PLAQUENIL) 200 mg tablet, Take 1 tablet (200 mg total) by mouth 2 (two) times daily., Disp: 180 tablet, Rfl: 1    levocetirizine (XYZAL) 5 MG tablet, Take 1 tablet (5 mg total) by mouth every evening., Disp: 30 tablet, Rfl: 11    losartan (COZAAR) 100 MG tablet, Take 1 tablet (100 mg total) by mouth once daily., Disp: 90 tablet, Rfl: 1    metoprolol succinate (TOPROL-XL) 50 MG 24 hr tablet, Take 1 tablet (50 mg total) by mouth once daily., Disp: 30 tablet, Rfl: 12    NIFEdipine (PROCARDIA-XL) 60 MG (OSM) 24 hr tablet, Take 1 tablet (60 mg total) by mouth once daily., Disp: 90 tablet, Rfl: 1    omeprazole (PRILOSEC) 40 MG capsule, Take 1 capsule (40 mg total) by mouth once daily., Disp: 30 capsule, Rfl: 11    rivaroxaban (XARELTO) 20 mg Tab, Take 1 tablet (20 mg total) by mouth daily with dinner or evening meal., Disp: 90 tablet, Rfl: 3    SITagliptin (JANUVIA) 50 MG Tab, Take 1 tablet (50 mg total) by mouth once daily., Disp: 90 tablet, Rfl: 3     Objective:         Vitals:    05/26/22 1022   BP: (!) 143/71   Pulse: 82   Resp: 20     Physical Exam   Musculoskeletal:      Comments: Can make fist, no synovitis  Heberden and lewis nodes  TTP over left lateral epicondyle   Knee crepitus  Negative ankle and MTP   No tender poitns       Reviewed old and all outside pertinent medical records available.    All lab results personally reviewed and interpreted by me.  Lab Results   Component Value Date    WBC 3.87 (L) 03/21/2022    HGB 11.3 (L) 03/21/2022    HCT 36.3 (L) 03/21/2022    MCV 90 03/21/2022    MCH 28.1 03/21/2022    MCHC 31.1 (L) 03/21/2022    RDW 13.3 03/21/2022     (L) 03/21/2022    MPV 11.8 03/21/2022    PLTEST Decreased (A) 07/07/2017       Lab Results   Component Value Date     03/21/2022     K 4.5 03/21/2022     03/21/2022    CO2 21 (L) 03/21/2022     03/21/2022    BUN 22 (H) 03/21/2022    CALCIUM 9.0 03/21/2022    PROT 7.2 03/21/2022    ALBUMIN 3.8 03/21/2022    BILITOT 0.3 03/21/2022    AST 21 03/21/2022    ALKPHOS 98 03/21/2022    ALT 15 03/21/2022       Lab Results   Component Value Date    COLORU Yellow 09/03/2020    APPEARANCEUA Clear 09/03/2020    SPECGRAV 1.020 09/03/2020    PHUR 6.0 09/03/2020    PROTEINUA Negative 09/03/2020    KETONESU Negative 09/03/2020    LEUKOCYTESUR Trace (A) 09/03/2020    NITRITE Positive (A) 09/03/2020    UROBILINOGEN Negative 09/03/2020       Lab Results   Component Value Date    CRP 4.2 02/27/2018       Lab Results   Component Value Date    SEDRATE 11 02/27/2018       Lab Results   Component Value Date    SEDRATE 11 02/27/2018       No components found for: 25OHVITDTOT, 32DBJMXG6, 04CMXFUL5, METHODNOTE    No results found for: URICACID    No components found for: TSPOTTB        Imaging:  All imaging reviewed and independently interpreted by me.         ASSESSMENT / PLAN:     Aggie Rasheed is a 56 y.o. White female with:      1. Systemic lupus erythematosus, unspecified SLE type, unspecified organ involvement status  - Hx of SLE with APS  - no evidence of active disease  - continue HCQ (slight QT prolongation, but the benefit of controlled SLE discussed, monitor EKG)  - sun screen advised   - update labs  - reassurance   - C-Reactive Protein; Future  - REBECA Screen w/Reflex; Future  - CBC Auto Differential; Future  - Comprehensive Metabolic Panel; Future  - Sjogrens syndrome-B extractable nuclear antibody; Future  - Sjogrens syndrome-A extractable nuclear antibody; Future  - Protein/Creatinine Ratio, Urine; Future  - C4 Complement; Future  - C3 Complement; Future  - Anti-Histone Antibody; Future  - Sedimentation rate; Future  - Urinalysis; Future  - Hepatitis B Surface Ab, Qualitative; Future  - Vitamin D; Future  - TSH; Future  - Hepatitis C  Antibody; Future  - Hepatitis B Surface Antigen; Future  - CK; Future  - XR Arthritis Survey; Future  - Anti-DNA Ab, Double-Stranded; Future  - DRVVT; Future  - Cardiolipin antibody; Future  - Beta-2 Glycoprotein Abs (IgA, IgG, IgM); Future    2. APS (antiphospholipid syndrome)  - continue AC    3. Primary osteoarthritis involving multiple joints  - patient with OA  - discussed diagnosis and management  - CSI to knees, see procedure note  - Ice area  - Tylenol PRN   - reassurance and exercise     4. Encounter for long-term (current) use of other medications  - annual EYE exam     5. Other specified counseling  - over 10 minutes spent regarding below topics:  - Immunization counseling done.  - Weight loss counseling done.  - Nutrition and exercise counseling.  - Limitation of alcohol consumption.  - Regular exercise:  Aerobic and resistance.  - Medication counseling provided.      Follow up in about 6 months (around 11/26/2022).    Method of contact with patient concerns: [facility]    Disclaimer:  This note is prepared using voice recognition software and as such is likely to have errors and has not been proof read. Please contact me for questions.     Time spent: 45 minutes in face to face discussion concerning diagnosis, prognosis, review of lab and test results, benefits of treatment as well as management of disease, counseling of patient and coordination of care between various health care providers.  Greater than half the time spent was used for coordination of care and counseling of patient.    Yasmani Chan M.D.  Rheumatology Department   [facility]

## 2022-06-14 ENCOUNTER — HOSPITAL ENCOUNTER (OUTPATIENT)
Dept: RADIOLOGY | Facility: HOSPITAL | Age: 57
Discharge: HOME | End: 2022-06-14
Attending: STUDENT IN AN ORGANIZED HEALTH CARE EDUCATION/TRAINING PROGRAM
Payer: MEDICAID

## 2022-06-14 DIAGNOSIS — R92.8 ABNORMAL MAMMOGRAM: ICD-10-CM

## 2022-06-14 PROCEDURE — 77061 MAMMO DIGITAL DIAGNOSTIC LEFT WITH TOMO: ICD-10-PCS | Mod: 26,LT,, | Performed by: RADIOLOGY

## 2022-06-14 PROCEDURE — 76642 ULTRASOUND BREAST LIMITED: CPT | Mod: TC,LT

## 2022-06-14 PROCEDURE — 77061 BREAST TOMOSYNTHESIS UNI: CPT | Mod: 26,LT,, | Performed by: RADIOLOGY

## 2022-06-14 PROCEDURE — 77065 DX MAMMO INCL CAD UNI: CPT | Mod: 26,LT,, | Performed by: RADIOLOGY

## 2022-06-14 PROCEDURE — 76642 US BREAST LEFT LIMITED: ICD-10-PCS | Mod: 26,LT,, | Performed by: RADIOLOGY

## 2022-06-14 PROCEDURE — 77065 MAMMO DIGITAL DIAGNOSTIC LEFT WITH TOMO: ICD-10-PCS | Mod: 26,LT,, | Performed by: RADIOLOGY

## 2022-06-14 PROCEDURE — 77065 DX MAMMO INCL CAD UNI: CPT | Mod: TC,LT

## 2022-06-14 PROCEDURE — 76642 ULTRASOUND BREAST LIMITED: CPT | Mod: 26,LT,, | Performed by: RADIOLOGY

## 2022-07-07 DIAGNOSIS — G62.9 PERIPHERAL POLYNEUROPATHY: ICD-10-CM

## 2022-07-07 NOTE — TELEPHONE ENCOUNTER
----- Message from Mirlande Zambrano sent at 7/7/2022  3:25 PM CDT -----  Pt stated she is going out of town and need med she called her insurance and they stated they will cover a vacation override for her to receive it earlier than her refill time   gabapentin (NEURONTIN) 600 MG tablet

## 2022-07-08 RX ORDER — GABAPENTIN 600 MG/1
1200 TABLET ORAL 3 TIMES DAILY
Qty: 180 TABLET | Refills: 0 | Status: SHIPPED | OUTPATIENT
Start: 2022-07-08 | End: 2022-08-02 | Stop reason: SDUPTHER

## 2022-08-02 ENCOUNTER — OFFICE VISIT (OUTPATIENT)
Dept: FAMILY MEDICINE | Facility: HOSPITAL | Age: 57
End: 2022-08-02
Attending: FAMILY MEDICINE
Payer: MEDICAID

## 2022-08-02 VITALS
DIASTOLIC BLOOD PRESSURE: 83 MMHG | HEART RATE: 82 BPM | WEIGHT: 142 LBS | BODY MASS INDEX: 26.13 KG/M2 | SYSTOLIC BLOOD PRESSURE: 180 MMHG | HEIGHT: 62 IN

## 2022-08-02 DIAGNOSIS — I10 ESSENTIAL HYPERTENSION: ICD-10-CM

## 2022-08-02 DIAGNOSIS — G62.9 PERIPHERAL POLYNEUROPATHY: ICD-10-CM

## 2022-08-02 DIAGNOSIS — R11.0 NAUSEA: Primary | ICD-10-CM

## 2022-08-02 DIAGNOSIS — J32.0 CHRONIC MAXILLARY SINUSITIS: ICD-10-CM

## 2022-08-02 PROCEDURE — 99214 OFFICE O/P EST MOD 30 MIN: CPT | Performed by: STUDENT IN AN ORGANIZED HEALTH CARE EDUCATION/TRAINING PROGRAM

## 2022-08-02 RX ORDER — ONDANSETRON 4 MG/1
4 TABLET, FILM COATED ORAL EVERY 12 HOURS PRN
Qty: 30 TABLET | Refills: 4 | Status: SHIPPED | OUTPATIENT
Start: 2022-08-02 | End: 2023-11-09 | Stop reason: SDUPTHER

## 2022-08-02 RX ORDER — NIFEDIPINE 60 MG/1
60 TABLET, EXTENDED RELEASE ORAL DAILY
Qty: 90 TABLET | Refills: 1 | Status: SHIPPED | OUTPATIENT
Start: 2022-08-02 | End: 2023-02-14 | Stop reason: SDUPTHER

## 2022-08-02 RX ORDER — FEXOFENADINE HCL 60 MG
60 TABLET ORAL DAILY
Qty: 30 TABLET | Refills: 0 | Status: SHIPPED | OUTPATIENT
Start: 2022-08-02 | End: 2022-09-01

## 2022-08-02 RX ORDER — GABAPENTIN 600 MG/1
1200 TABLET ORAL 3 TIMES DAILY
Qty: 180 TABLET | Refills: 0 | Status: SHIPPED | OUTPATIENT
Start: 2022-08-02 | End: 2023-03-29 | Stop reason: SDUPTHER

## 2022-08-02 NOTE — PROGRESS NOTES
Subjective:       Patient ID: Aggie Rasheed is a 56 y.o. female.    Chief Complaint: Follow-up    HPI 57 yo female presents for check up and med refill, multiple complaints.     Sinusitis and ear drainage L>R, constant rhinorrhea. Takes xyzal    CKD 3a, last BUN/Cr 24/1.6. Appointment with Nephrology this month.     Hypertension: repeat /76. Home BP checks <140/90. Has been out of Nifedipine for the last week, still has been taking losartan.     Nausea: chronic, worse in am. Takes zofran 1-2 x/wk.     Last PAP 8/15/2016 ?, due  Last Mammo 6/14/2022  Last Colonoscopy 8/13/2013, due 2023    Past Medical History:   Diagnosis Date    Diabetes mellitus type II     Headache associated with hormonal factors     Hyperlipidemia     Hypertension     Lupus     Protein C deficiency     Tachycardia      Review of Systems     10 point review of systems was conducted and only the pertinent positives and pertinent negatives are noted above in the HPI section.    Objective:      Vitals:    08/02/22 1004   BP: (!) 180/83   Pulse: 82   Manual Repeat 148/76    Physical Exam  Vitals and nursing note reviewed.   Constitutional:       General: She is not in acute distress.     Appearance: Normal appearance. She is not ill-appearing.   HENT:      Head: Normocephalic and atraumatic.      Right Ear: Tympanic membrane, ear canal and external ear normal. No drainage or tenderness.      Left Ear: Tympanic membrane and external ear normal. No drainage or tenderness.      Ears:      Comments: Mild erythema to left ear canal     Nose: Nose normal.      Mouth/Throat:      Mouth: Mucous membranes are moist.      Pharynx: Oropharynx is clear.   Eyes:      Extraocular Movements: Extraocular movements intact.      Conjunctiva/sclera: Conjunctivae normal.      Pupils: Pupils are equal, round, and reactive to light.   Cardiovascular:      Rate and Rhythm: Normal rate and regular rhythm.      Pulses: Normal pulses.      Heart sounds:  Normal heart sounds. No murmur heard.  Pulmonary:      Effort: Pulmonary effort is normal. No respiratory distress.      Breath sounds: Normal breath sounds. No wheezing.   Abdominal:      General: Bowel sounds are normal.      Palpations: Abdomen is soft.   Musculoskeletal:         General: Normal range of motion.      Cervical back: Normal range of motion and neck supple.      Right lower leg: No edema.      Left lower leg: No edema.   Skin:     General: Skin is warm.      Capillary Refill: Capillary refill takes less than 2 seconds.   Neurological:      General: No focal deficit present.      Mental Status: She is alert and oriented to person, place, and time.   Psychiatric:         Mood and Affect: Mood normal.         Behavior: Behavior normal.         Assessment:       1. Nausea    2. Peripheral polyneuropathy    3. Essential hypertension    4. Chronic maxillary sinusitis        Plan:       Nausea  -     ondansetron (ZOFRAN) 4 MG tablet; Take 1 tablet (4 mg total) by mouth every 12 (twelve) hours as needed for Nausea.  Dispense: 30 tablet; Refill: 4    Peripheral polyneuropathy  -     gabapentin (NEURONTIN) 600 MG tablet; Take 2 tablets (1,200 mg total) by mouth 3 (three) times daily.  Dispense: 180 tablet; Refill: 0    Essential hypertension   Counseled patient on lifestyle modifications   Not in goal, out of medication x 1 week  -     NIFEdipine (PROCARDIA-XL) 60 MG (OSM) 24 hr tablet; Take 1 tablet (60 mg total) by mouth once daily.  Dispense: 90 tablet; Refill: 1    Chronic maxillary sinusitis  -     fexofenadine (ALLEGRA) 60 MG tablet; Take 1 tablet (60 mg total) by mouth once daily.  Dispense: 30 tablet; Refill: 0      Follow up in about 3 months (around 11/2/2022), or if symptoms worsen or fail to improve, for lab work, schedule PAP.    BP check    June Mathew MD, [facility] PGY-3  08/02/2022

## 2022-10-13 DIAGNOSIS — E11.9 TYPE 2 DIABETES MELLITUS WITHOUT COMPLICATION, WITHOUT LONG-TERM CURRENT USE OF INSULIN: ICD-10-CM

## 2022-10-13 NOTE — TELEPHONE ENCOUNTER
----- Message from Aminta Phillips sent at 10/13/2022  9:56 AM CDT -----  Patient requested a refill on the following medicine,        SITagliptin (JANUVIA) 50 MG Tab ()      [id number] - [place], [place] - [address] AT [facility] [address]   Phone:  [phone]  Fax:  [phone]

## 2022-11-07 ENCOUNTER — OFFICE VISIT (OUTPATIENT)
Dept: FAMILY MEDICINE | Facility: HOSPITAL | Age: 57
End: 2022-11-07
Payer: MEDICAID

## 2022-11-07 VITALS
WEIGHT: 139.31 LBS | HEIGHT: 62 IN | BODY MASS INDEX: 25.64 KG/M2 | SYSTOLIC BLOOD PRESSURE: 154 MMHG | HEART RATE: 75 BPM | DIASTOLIC BLOOD PRESSURE: 70 MMHG

## 2022-11-07 DIAGNOSIS — K59.1 FUNCTIONAL DIARRHEA: ICD-10-CM

## 2022-11-07 DIAGNOSIS — I10 ESSENTIAL HYPERTENSION: ICD-10-CM

## 2022-11-07 DIAGNOSIS — T78.40XD ALLERGY, SUBSEQUENT ENCOUNTER: ICD-10-CM

## 2022-11-07 DIAGNOSIS — D68.59 PROTEIN C DEFICIENCY: ICD-10-CM

## 2022-11-07 DIAGNOSIS — M32.8 OTHER FORMS OF SYSTEMIC LUPUS ERYTHEMATOSUS, UNSPECIFIED ORGAN INVOLVEMENT STATUS: ICD-10-CM

## 2022-11-07 DIAGNOSIS — E11.9 TYPE 2 DIABETES MELLITUS WITHOUT COMPLICATION, WITHOUT LONG-TERM CURRENT USE OF INSULIN: Primary | ICD-10-CM

## 2022-11-07 DIAGNOSIS — E78.1 HYPERTRIGLYCERIDEMIA: ICD-10-CM

## 2022-11-07 PROCEDURE — 99213 OFFICE O/P EST LOW 20 MIN: CPT | Performed by: STUDENT IN AN ORGANIZED HEALTH CARE EDUCATION/TRAINING PROGRAM

## 2022-11-07 RX ORDER — ATORVASTATIN CALCIUM 40 MG/1
40 TABLET, FILM COATED ORAL DAILY
Qty: 90 TABLET | Refills: 3 | Status: SHIPPED | OUTPATIENT
Start: 2022-11-07 | End: 2023-10-03 | Stop reason: SDUPTHER

## 2022-11-07 RX ORDER — HYDROXYCHLOROQUINE SULFATE 200 MG/1
200 TABLET, FILM COATED ORAL 2 TIMES DAILY
Qty: 180 TABLET | Refills: 3 | Status: SHIPPED | OUTPATIENT
Start: 2022-11-07 | End: 2023-10-03 | Stop reason: SDUPTHER

## 2022-11-07 RX ORDER — LEVOCETIRIZINE DIHYDROCHLORIDE 5 MG/1
5 TABLET, FILM COATED ORAL DAILY PRN
COMMUNITY
Start: 2022-09-04 | End: 2022-11-07 | Stop reason: SDUPTHER

## 2022-11-07 RX ORDER — LOSARTAN POTASSIUM 100 MG/1
100 TABLET ORAL DAILY
Qty: 90 TABLET | Refills: 1 | Status: SHIPPED | OUTPATIENT
Start: 2022-11-07 | End: 2023-03-29 | Stop reason: SDUPTHER

## 2022-11-07 RX ORDER — HYDRALAZINE HYDROCHLORIDE 50 MG/1
50 TABLET, FILM COATED ORAL 3 TIMES DAILY
Qty: 90 TABLET | Refills: 11 | Status: SHIPPED | OUTPATIENT
Start: 2022-11-07 | End: 2023-10-03 | Stop reason: SDUPTHER

## 2022-11-07 RX ORDER — LEVOCETIRIZINE DIHYDROCHLORIDE 5 MG/1
5 TABLET, FILM COATED ORAL DAILY PRN
Qty: 30 TABLET | Refills: 5 | Status: SHIPPED | OUTPATIENT
Start: 2022-11-07 | End: 2023-05-29 | Stop reason: SDUPTHER

## 2022-11-07 NOTE — PROGRESS NOTES
Subjective:       Patient ID: Aggie Rasheed is a 57 y.o. female.    Chief Complaint: Follow-up and Medication Refill    HPI 58 yo female presents for med refills.     #HTN: takes Nifedipine 60. /70, last visit 180/83 came down to 143. Home checks avg 135/80.    Discussed past results: PFT without obstruction or restriction. LDCT benign, repeat 2023.     Past Medical History:   Diagnosis Date    Diabetes mellitus type II     Headache associated with hormonal factors     Hyperlipidemia     Hypertension     Lupus     Protein C deficiency     Tachycardia      Review of Systems     10 point review of systems was conducted and only the pertinent positives and pertinent negatives are noted above in the HPI section.    Objective:      Vitals:    11/07/22 1049   BP: (!) 154/70   Pulse: 75     Physical Exam  Vitals and nursing note reviewed.   Constitutional:       General: She is not in acute distress.     Appearance: Normal appearance. She is not ill-appearing.   Eyes:      Extraocular Movements: Extraocular movements intact.      Conjunctiva/sclera: Conjunctivae normal.      Pupils: Pupils are equal, round, and reactive to light.   Cardiovascular:      Rate and Rhythm: Normal rate and regular rhythm.      Pulses: Normal pulses.      Heart sounds: Normal heart sounds. No murmur heard.  Pulmonary:      Effort: Pulmonary effort is normal. No respiratory distress.      Breath sounds: Normal breath sounds. No wheezing.   Abdominal:      General: Bowel sounds are normal. There is no distension.      Palpations: Abdomen is soft.      Tenderness: There is no abdominal tenderness.   Musculoskeletal:         General: Normal range of motion.      Cervical back: Normal range of motion.      Right lower leg: No edema.      Left lower leg: No edema.   Skin:     General: Skin is warm.      Capillary Refill: Capillary refill takes less than 2 seconds.   Neurological:      General: No focal deficit present.      Mental Status: She  is alert and oriented to person, place, and time.   Psychiatric:         Mood and Affect: Mood normal.         Behavior: Behavior normal.       Assessment:       1. Type 2 diabetes mellitus without complication, without long-term current use of insulin    2. Essential hypertension    3. Hypertriglyceridemia    4. Other forms of systemic lupus erythematosus, unspecified organ involvement status    5. Protein C deficiency    6. Allergy, subsequent encounter    7. Functional diarrhea        Plan:       Type 2 diabetes mellitus without complication, without long-term current use of insulin  -     CBC Auto Differential; Future; Expected date: 11/07/2022  -     Comprehensive Metabolic Panel; Future; Expected date: 11/07/2022  -     Hemoglobin A1C; Future; Expected date: 11/07/2022  -     Lipid Panel; Future; Expected date: 11/07/2022    Essential hypertension  -     hydrALAZINE (APRESOLINE) 50 MG tablet; Take 1 tablet (50 mg total) by mouth 3 (three) times daily.  Dispense: 90 tablet; Refill: 11  -     losartan (COZAAR) 100 MG tablet; Take 1 tablet (100 mg total) by mouth once daily.  Dispense: 90 tablet; Refill: 1    Hypertriglyceridemia  -     atorvastatin (LIPITOR) 40 MG tablet; Take 1 tablet (40 mg total) by mouth once daily.  Dispense: 90 tablet; Refill: 3    Other forms of systemic lupus erythematosus, unspecified organ involvement status  -     hydrOXYchloroQUINE (PLAQUENIL) 200 mg tablet; Take 1 tablet (200 mg total) by mouth 2 (two) times daily.  Dispense: 180 tablet; Refill: 3    Protein C deficiency  -     rivaroxaban (XARELTO) 20 mg Tab; Take 1 tablet (20 mg total) by mouth daily with dinner or evening meal.  Dispense: 90 tablet; Refill: 3    Allergy, subsequent encounter  -     levocetirizine (XYZAL) 5 MG tablet; Take 1 tablet (5 mg total) by mouth daily as needed for Allergies.  Dispense: 30 tablet; Refill: 5    Functional diarrhea  -     CBC Auto Differential; Future; Expected date: 11/07/2022  -      Comprehensive Metabolic Panel; Future; Expected date: 11/07/2022  -     C-reactive protein; Future; Expected date: 11/07/2022  -     TISSUE TRANSGLUTAMINASE, IGA; Future; Expected date: 11/07/2022  -     IGA; Future; Expected date: 11/07/2022  -     Lipid Panel; Future; Expected date: 11/07/2022    Follow up in about 6 months (around 5/7/2023), or if symptoms worsen or fail to improve.        June Mathew MD, [facility] PGY-3  11/07/2022

## 2022-11-14 ENCOUNTER — OFFICE VISIT (OUTPATIENT)
Dept: URGENT CARE | Facility: CLINIC | Age: 57
End: 2022-11-14
Payer: MEDICAID

## 2022-11-14 VITALS
HEIGHT: 62 IN | WEIGHT: 138 LBS | OXYGEN SATURATION: 95 % | TEMPERATURE: 99 F | BODY MASS INDEX: 25.4 KG/M2 | HEART RATE: 75 BPM | DIASTOLIC BLOOD PRESSURE: 63 MMHG | SYSTOLIC BLOOD PRESSURE: 107 MMHG | RESPIRATION RATE: 20 BRPM

## 2022-11-14 DIAGNOSIS — Z11.59 SCREENING FOR VIRAL DISEASE: ICD-10-CM

## 2022-11-14 DIAGNOSIS — J20.8 ACUTE BACTERIAL BRONCHITIS: Primary | ICD-10-CM

## 2022-11-14 DIAGNOSIS — F17.200 NEEDS SMOKING CESSATION EDUCATION: ICD-10-CM

## 2022-11-14 DIAGNOSIS — B96.89 ACUTE BACTERIAL BRONCHITIS: Primary | ICD-10-CM

## 2022-11-14 LAB
CTP QC/QA: YES
CTP QC/QA: YES
POC MOLECULAR INFLUENZA A AGN: NEGATIVE
POC MOLECULAR INFLUENZA B AGN: NEGATIVE
SARS-COV-2 AG RESP QL IA.RAPID: NEGATIVE

## 2022-11-14 PROCEDURE — 93005 EKG 12-LEAD: ICD-10-PCS | Mod: S$GLB,,,

## 2022-11-14 PROCEDURE — 3044F PR MOST RECENT HEMOGLOBIN A1C LEVEL <7.0%: ICD-10-PCS | Mod: CPTII,S$GLB,,

## 2022-11-14 PROCEDURE — 4010F ACE/ARB THERAPY RXD/TAKEN: CPT | Mod: CPTII,S$GLB,,

## 2022-11-14 PROCEDURE — 93010 EKG 12-LEAD: ICD-10-PCS | Mod: S$PBB,,, | Performed by: INTERNAL MEDICINE

## 2022-11-14 PROCEDURE — 3066F PR DOCUMENTATION OF TREATMENT FOR NEPHROPATHY: ICD-10-PCS | Mod: CPTII,S$GLB,,

## 2022-11-14 PROCEDURE — 3066F NEPHROPATHY DOC TX: CPT | Mod: CPTII,S$GLB,,

## 2022-11-14 PROCEDURE — 87502 INFLUENZA DNA AMP PROBE: CPT | Mod: QW,S$GLB,,

## 2022-11-14 PROCEDURE — 93005 ELECTROCARDIOGRAM TRACING: CPT | Mod: S$GLB,,,

## 2022-11-14 PROCEDURE — 3061F PR NEG MICROALBUMINURIA RESULT DOCUMENTED/REVIEW: ICD-10-PCS | Mod: CPTII,S$GLB,,

## 2022-11-14 PROCEDURE — 3074F PR MOST RECENT SYSTOLIC BLOOD PRESSURE < 130 MM HG: ICD-10-PCS | Mod: CPTII,S$GLB,,

## 2022-11-14 PROCEDURE — 3074F SYST BP LT 130 MM HG: CPT | Mod: CPTII,S$GLB,,

## 2022-11-14 PROCEDURE — 87811 SARS CORONAVIRUS 2 ANTIGEN POCT, MANUAL READ: ICD-10-PCS | Mod: QW,S$GLB,,

## 2022-11-14 PROCEDURE — 1159F PR MEDICATION LIST DOCUMENTED IN MEDICAL RECORD: ICD-10-PCS | Mod: CPTII,S$GLB,,

## 2022-11-14 PROCEDURE — 3078F DIAST BP <80 MM HG: CPT | Mod: CPTII,S$GLB,,

## 2022-11-14 PROCEDURE — 99214 PR OFFICE/OUTPT VISIT, EST, LEVL IV, 30-39 MIN: ICD-10-PCS | Mod: S$GLB,,,

## 2022-11-14 PROCEDURE — 4010F PR ACE/ARB THEARPY RXD/TAKEN: ICD-10-PCS | Mod: CPTII,S$GLB,,

## 2022-11-14 PROCEDURE — 87502 POCT INFLUENZA A/B MOLECULAR: ICD-10-PCS | Mod: QW,S$GLB,,

## 2022-11-14 PROCEDURE — 3044F HG A1C LEVEL LT 7.0%: CPT | Mod: CPTII,S$GLB,,

## 2022-11-14 PROCEDURE — 1160F PR REVIEW ALL MEDS BY PRESCRIBER/CLIN PHARMACIST DOCUMENTED: ICD-10-PCS | Mod: CPTII,S$GLB,,

## 2022-11-14 PROCEDURE — 3008F PR BODY MASS INDEX (BMI) DOCUMENTED: ICD-10-PCS | Mod: CPTII,S$GLB,,

## 2022-11-14 PROCEDURE — 1159F MED LIST DOCD IN RCRD: CPT | Mod: CPTII,S$GLB,,

## 2022-11-14 PROCEDURE — 99214 OFFICE O/P EST MOD 30 MIN: CPT | Mod: S$GLB,,,

## 2022-11-14 PROCEDURE — 3078F PR MOST RECENT DIASTOLIC BLOOD PRESSURE < 80 MM HG: ICD-10-PCS | Mod: CPTII,S$GLB,,

## 2022-11-14 PROCEDURE — 87811 SARS-COV-2 COVID19 W/OPTIC: CPT | Mod: QW,S$GLB,,

## 2022-11-14 PROCEDURE — 1160F RVW MEDS BY RX/DR IN RCRD: CPT | Mod: CPTII,S$GLB,,

## 2022-11-14 PROCEDURE — 3008F BODY MASS INDEX DOCD: CPT | Mod: CPTII,S$GLB,,

## 2022-11-14 PROCEDURE — 71046 X-RAY EXAM CHEST 2 VIEWS: CPT | Mod: FY,S$GLB,, | Performed by: RADIOLOGY

## 2022-11-14 PROCEDURE — 71046 XR CHEST PA AND LATERAL: ICD-10-PCS | Mod: FY,S$GLB,, | Performed by: RADIOLOGY

## 2022-11-14 PROCEDURE — 3061F NEG MICROALBUMINURIA REV: CPT | Mod: CPTII,S$GLB,,

## 2022-11-14 PROCEDURE — 93010 ELECTROCARDIOGRAM REPORT: CPT | Mod: S$PBB,,, | Performed by: INTERNAL MEDICINE

## 2022-11-14 RX ORDER — ALBUTEROL SULFATE 90 UG/1
2 AEROSOL, METERED RESPIRATORY (INHALATION) EVERY 6 HOURS PRN
Qty: 6.7 G | Refills: 0 | Status: SHIPPED | OUTPATIENT
Start: 2022-11-14 | End: 2023-07-13

## 2022-11-14 RX ORDER — BENZONATATE 100 MG/1
100 CAPSULE ORAL 3 TIMES DAILY PRN
Qty: 30 CAPSULE | Refills: 0 | Status: SHIPPED | OUTPATIENT
Start: 2022-11-14 | End: 2022-11-24

## 2022-11-14 RX ORDER — DEXAMETHASONE SODIUM PHOSPHATE 100 MG/10ML
10 INJECTION INTRAMUSCULAR; INTRAVENOUS
Status: COMPLETED | OUTPATIENT
Start: 2022-11-14 | End: 2022-11-14

## 2022-11-14 RX ORDER — PREDNISONE 20 MG/1
20 TABLET ORAL DAILY
Qty: 4 TABLET | Refills: 0 | Status: SHIPPED | OUTPATIENT
Start: 2022-11-14 | End: 2022-11-18

## 2022-11-14 RX ORDER — AZITHROMYCIN 250 MG/1
TABLET, FILM COATED ORAL
Qty: 6 TABLET | Refills: 0 | Status: SHIPPED | OUTPATIENT
Start: 2022-11-14 | End: 2022-11-19

## 2022-11-14 RX ADMIN — DEXAMETHASONE SODIUM PHOSPHATE 10 MG: 100 INJECTION INTRAMUSCULAR; INTRAVENOUS at 07:11

## 2022-11-14 NOTE — PROGRESS NOTES
"Subjective:       Patient ID: Aggie Rasheed is a 57 y.o. female.    Vitals:  height is 5' 2" (1.575 m) and weight is 62.6 kg (138 lb). Her oral temperature is 99.1 °F (37.3 °C). Her blood pressure is 107/63 and her pulse is 75. Her respiration is 20 and oxygen saturation is 95%.     Chief Complaint: Headache    57 year old female presents today with a HA, fever 3 days ago but none today, cough, sinus pressure. No known exposure to anything. Symptoms started 3 days ago. Positive COVID 2021. Not vaccinated for COVID. Treatments at home include Motrin for fever and Mucinex but stopped taking it because it was making her feel weird.     Headache   The current episode started in the past 7 days. The problem occurs constantly. The problem has been unchanged. The pain is located in the Frontal region. The pain does not radiate. The pain quality is similar to prior headaches. The quality of the pain is described as aching. The pain is at a severity of 6/10. Associated symptoms include coughing, a fever, sinus pressure and a sore throat. Nothing aggravates the symptoms. She has tried NSAIDs for the symptoms. The treatment provided no relief.     Constitution: Positive for fever.   HENT:  Positive for sinus pressure and sore throat.    Respiratory:  Positive for cough.    Neurological:  Positive for headaches.     Objective:      Physical Exam   Constitutional: She is oriented to person, place, and time. She appears well-developed. She is cooperative.  Non-toxic appearance. She does not appear ill. No distress.   HENT:   Head: Normocephalic and atraumatic.   Ears:   Right Ear: Hearing, tympanic membrane, external ear and ear canal normal. No no drainage, swelling or tenderness. No mastoid tenderness. Tympanic membrane is not injected, not scarred, not perforated, not erythematous, not retracted and not bulging. No middle ear effusion.   Left Ear: Hearing, tympanic membrane, external ear and ear canal normal. No no " drainage, swelling or tenderness. No mastoid tenderness. Tympanic membrane is not injected, not scarred, not perforated, not erythematous, not retracted and not bulging.  No middle ear effusion.   Nose: Nose normal. No mucosal edema, rhinorrhea or nasal deformity. No epistaxis. Right sinus exhibits no maxillary sinus tenderness and no frontal sinus tenderness. Left sinus exhibits no maxillary sinus tenderness and no frontal sinus tenderness.   Mouth/Throat: Uvula is midline, oropharynx is clear and moist and mucous membranes are normal. No trismus in the jaw. Normal dentition. No uvula swelling. Cobblestoning present. No oropharyngeal exudate, posterior oropharyngeal edema, posterior oropharyngeal erythema or tonsillar abscesses. Tonsils are 1+ on the right. Tonsils are 1+ on the left. No tonsillar exudate.   Eyes: Conjunctivae and lids are normal. No scleral icterus.   Neck: Trachea normal and phonation normal. Neck supple. No edema present. No erythema present. No neck rigidity present.   Cardiovascular: Normal rate, regular rhythm, S1 normal, S2 normal, normal heart sounds and normal pulses.   Pulmonary/Chest: Effort normal and breath sounds normal. No accessory muscle usage or stridor. No apnea, no tachypnea and no bradypnea. No respiratory distress. She has no decreased breath sounds. She has no wheezes. She has no rhonchi. She has no rales.   Abdominal: Normal appearance.   Musculoskeletal: Normal range of motion.         General: No deformity. Normal range of motion.   Neurological: She is alert and oriented to person, place, and time. She exhibits normal muscle tone. Coordination normal.   Skin: Skin is warm, dry, intact, not diaphoretic and not pale.   Psychiatric: Her speech is normal and behavior is normal. Judgment and thought content normal.   Nursing note and vitals reviewed.      Results for orders placed or performed in visit on 11/14/22   POCT Influenza A/B MOLECULAR   Result Value Ref Range    POC  Molecular Influenza A Ag Negative Negative, Not Reported    POC Molecular Influenza B Ag Negative Negative, Not Reported     Acceptable Yes    SARS Coronavirus 2 Antigen, POCT Manual Read   Result Value Ref Range    SARS Coronavirus 2 Antigen Negative Negative     Acceptable Yes      XR CHEST PA AND LATERAL    Result Date: 11/14/2022  EXAMINATION: XR CHEST PA AND LATERAL CLINICAL HISTORY: Encounter for screening for other viral diseases TECHNIQUE: PA and lateral views of the chest were performed. COMPARISON: 11/11/2016 FINDINGS: The cardiomediastinal silhouette is not enlarged noting calcification of the aorta..  There is no pleural effusion.  The trachea is midline.  The lungs are symmetrically expanded bilaterally with coarse interstitial attenuation accentuated by habitus..  No large focal consolidation seen.  There is no pneumothorax.  The osseous structures are remarkable for degenerative changes..     1. Coarse interstitial attenuation is accentuated by habitus however differential would include edema or other nonspecific infectious or inflammatory process including viral process.  Correlation with any history of COPD/emphysema. Electronically signed by: Vadim Zafar MD Date:    11/14/2022 Time:    19:53    Assessment:       1. Acute bacterial bronchitis    2. Screening for viral disease          Plan:         Acute bacterial bronchitis  -     dexAMETHasone injection 10 mg  -     benzonatate (TESSALON) 100 MG capsule; Take 1 capsule (100 mg total) by mouth 3 (three) times daily as needed.  Dispense: 30 capsule; Refill: 0  -     predniSONE (DELTASONE) 20 MG tablet; Take 1 tablet (20 mg total) by mouth once daily. for 4 days  Dispense: 4 tablet; Refill: 0  -     azithromycin (Z-ROSALIA) 250 MG tablet; Take 2 tablets by mouth on day 1; Take 1 tablet by mouth on days 2-5  Dispense: 6 tablet; Refill: 0    Screening for viral disease  -     POCT Influenza A/B MOLECULAR  -     SARS  Coronavirus 2 Antigen, POCT Manual Read  -     XR CHEST PA AND LATERAL; Future; Expected date: 11/14/2022  -     IN OFFICE EKG 12-LEAD (to Chemult)    Other orders  -     albuterol (PROVENTIL/VENTOLIN HFA) 90 mcg/actuation inhaler; Inhale 2 puffs into the lungs every 6 (six) hours as needed for Wheezing or Shortness of Breath. Rescue  Dispense: 6.7 g; Refill: 0         Medical Decision Making:   Initial Assessment:   PT in room AAOX4, skin W/D, resp E/U, non toxic in appearance, NAD.    Clinical Tests:   Medical Tests: Ordered  Urgent Care Management:  Discussed that did not see any pneumonia on x-ray and will call patient if the radiologist sees something different.  Discussed with patient  I believe patient has a bacterial bronchitis patient is having persistent cough symptoms have been going on for over 1 week and patient is a smoker.  Discussed with patient I will send patient home with azithromycin.  And will send home patient with prednisone and start prednisone tomorrow.  Discussed with patient that a steroid shot be given today to help with inflammation of bronchials.  Discussed test results with PT. Discussed, if condition worsens or fails to improve that PT receive another evaluation at the ER immediately or contact your PCP to discuss your concerns or return here. Also addressed is the use of Zyrtec, Claritin or Allegra for congestion and sinus pressure. Also reviewed was the use of Flonase and to use as directed by medication label. Also discussed was rest and fluids as well as Tylenol or ibuprofen can also be used as directed for pain or fever. Also discuss is the use of chloraseptic or cepacol for sore throat but to clear with pharmacist due to HTN.  Discussed you can use a tbsp of honey to coat throat, and you can also try to use warm salt water gargles. Also reviewed was to take Tessalon as prescribed PT verbalized understanding and agreed with plan and diagnosis. PT ambulatory out of clinic,  NAD.    EKG NSR possible Left atrial enlargement Prolong QT Abnormal ECG Vent rate 71 bpm, No signs of ischemia EKG is similar to  EKG March 22, 2022

## 2022-11-15 NOTE — PATIENT INSTRUCTIONS
"Bronchitis  If your condition worsens or fails to improve we recommend that you receive another evaluation at the ER immediately or contact your PCP to discuss your concerns or return here. You must understand that you've received an urgent care treatment only and that you may be released before all your medical problems are known or treated. You the patient will arrange for follouwp care as instructed. .  Rest and fluids are important  Can use honey with ivelisse to soothe your throat  Take inhaler as prescribed and needed for wheezing  Take prescription cough meds (pills) as prescribed; take prescription cough syrup at night as needed for cough.  Do not take both the prescribed cough pills and syrup at the same time.   -  Flonase (fluticasone) is a nasal spray which is available over the counter and may help with your symptoms.   -  Zyrtec D, Claritin D or Allegra D can also help with symptoms of congestion and drainage.   -  If you have hypertension avoid using the "D" which is the decongestant.  Instead you can use Coricidin HBP for cold and cough symptoms.    -  If you just have drainage you can take plain Zyrtec, Claritin or Allegra   -  If you just have a congested feeling you can take pseudoephedrine (unless you have high blood pressure) which you have to sign for behind the counter. Do not buy the phenylephrine which is on the shelf as it is not effective   -  Rest and fluids are also important.   -  Tylenol or ibuprofen can also be used as directed for pain unless you have an allergy to them or medical condition such as stomach ulcers, kidney or liver disease or blood thinners etc for which you should not be taking these type of medications.   Please follow up with your primary care doctor or specialist in the next 48-72hrs as needed and if no improvement  If you  smoke, please stop smoking.                                                               "

## 2022-11-15 NOTE — PROGRESS NOTES
I assume primary medical responsibility for this patient. I have reviewed the history, physical, and assessement & treatment plan with the resident and agree that the care is reasonable and necessary. This service has been performed by a resident without the presence of a teaching physician under the primary care exception. If necessary, an addendum of additional findings or evaluation beyond the resident documentation will be noted below.      Erin Van MD    [facility]

## 2022-12-06 ENCOUNTER — OFFICE VISIT (OUTPATIENT)
Dept: FAMILY MEDICINE | Facility: HOSPITAL | Age: 57
End: 2022-12-06
Payer: MEDICAID

## 2022-12-06 VITALS
HEIGHT: 62 IN | WEIGHT: 133.81 LBS | HEART RATE: 71 BPM | DIASTOLIC BLOOD PRESSURE: 57 MMHG | SYSTOLIC BLOOD PRESSURE: 110 MMHG | BODY MASS INDEX: 24.63 KG/M2

## 2022-12-06 DIAGNOSIS — J44.1 COPD WITH ACUTE EXACERBATION: Primary | ICD-10-CM

## 2022-12-06 PROCEDURE — 99214 OFFICE O/P EST MOD 30 MIN: CPT | Performed by: STUDENT IN AN ORGANIZED HEALTH CARE EDUCATION/TRAINING PROGRAM

## 2022-12-06 RX ORDER — AZITHROMYCIN 250 MG/1
TABLET, FILM COATED ORAL
Qty: 6 TABLET | Refills: 0 | Status: SHIPPED | OUTPATIENT
Start: 2022-12-06 | End: 2022-12-11

## 2022-12-06 RX ORDER — PREDNISONE 20 MG/1
40 TABLET ORAL DAILY
Qty: 10 TABLET | Refills: 0 | Status: SHIPPED | OUTPATIENT
Start: 2022-12-06 | End: 2022-12-11

## 2022-12-06 NOTE — PROGRESS NOTES
Progress Note  [facility]    Subjective:     Aggie Rasheed is a 57 y.o. year old female with PMHx of tobacco abuse, HTN, COPD who presents to clinic for acute illness.     Pt initially seen and diagnosed with acute bronchitis on 11/14/22. Was given albuterol and tessalon pearls. COVID and flu negative. Initially started to improve then was around her grandchildren who were sick. Pt then started having fatigue and muscle weakness for about 1 week. Has been waking up nauseous. Having some muscle spasms. Fever started Monday of last week, highest was 100.4 now temperature has been low at 96.2 starting 2-3 days ago. Cough and breathless, significant SAENZ with light headedness and dizziness for about 2-3 days as well. Cough starting to become productive. Tessalon pearls don't help. Has been using the inhaler given with only mild relief.     Pt has history of HTN. BP has been low since the urgent care visit which is very abnormal for her. Not really eating or drinking. Does not check her BP regularly at home.      Tobaco: currently smoking 1ppd    Patient Active Problem List    Diagnosis Date Noted    SOB (shortness of breath)     Tobacco abuse 07/30/2021    Physical deconditioning 04/20/2021    Decreased strength 04/20/2021    Decreased ROM of intervertebral discs of lumbar spine 04/20/2021    Decreased mobility and endurance 04/20/2021    Current use of long term anticoagulation 03/22/2019    History of thrombosis 03/21/2019    Iron deficiency anemia due to chronic blood loss 06/19/2017    Gastritis 06/19/2017    Polyp of colon 06/19/2017    Iron deficiency anemia 04/10/2017    Migraine without aura 01/12/2017    Multiple thyroid nodules 01/12/2017    Nuclear sclerosis 07/02/2015    Dry eye syndrome 07/02/2015    Type 2 diabetes mellitus without ophthalmic manifestations 07/02/2015    Protein C deficiency 08/02/2013    Headache associated with hormonal factors     Lupus     Hypertension     Hyperlipidemia      "    Review of patient's allergies indicates:   Allergen Reactions    Niacin preparations Other (See Comments)     Red inflammed eyes    Codeine Rash and Other (See Comments)        Past Medical History:   Diagnosis Date    Diabetes mellitus type II     Headache associated with hormonal factors     Hyperlipidemia     Hypertension     Lupus     Protein C deficiency     Tachycardia       Past Surgical History:   Procedure Laterality Date    AORTIC VALVE SURGERY       SECTION      COLONOSCOPY N/A 2017    Procedure: COLONOSCOPY;  Surgeon: Markos Calvert MD;  Location: [facility];  Service: Endoscopy;  Laterality: N/A;    TOE AMPUTATION      TONSILLECTOMY      WRIST FRACTURE SURGERY        Family History   Problem Relation Age of Onset    Hypertension Mother     Cancer Father     Diabetes Maternal Uncle     Stroke Maternal Grandfather     Colon cancer Paternal Aunt       Social History     Tobacco Use    Smoking status: Every Day     Packs/day: 1.00     Years: 35.00     Pack years: 35.00     Types: Cigarettes    Smokeless tobacco: Never   Substance Use Topics    Alcohol use: No        Objective:     Vitals:    22 1607   BP: (!) 110/57   Pulse: 71   Weight: 60.7 kg (133 lb 13.1 oz)   Height: 5' 2" (1.575 m)     BMI: 24.48    Gen: No apparent distress, appears pale, appears stated age  CV: RRR, S1 and S2 present, no LE edema  Resp: Expiratory wheezing bilaterally, moving air through all lung fields. Cough elicited with deep breathing  HEENT: Oropharynx erythematous, tonsils without exudates    POCT COVID swab negative    Assessment/Plan:     Aggie Rasheed is a 57 y.o. year old female who presents to clinic for acute illness.      1. COPD with acute exacerbation  Acute illness and exam findings consistent with COPD exacerbation. Hypotension with low temperature at home concerning for bacteremia. Does not appear to be septic at this time. Recommended patient go to the ED to be evaluated. Pt declined. " Given temperature normal during this visit and HR wnl appropriate to discharge home with strict return precautions. Discussed concern and need for close home BP, temperature and symptoms monitoring. Pt verbalized understanding and was in agreement with the plan.  Will treat COPD exacerbation and evaluate for bacteremia and pneumonia.   - CBC Auto Differential; Future  - Blood culture; Future  - Blood culture; Future  - Comprehensive Metabolic Panel; Future  - azithromycin (Z-ROSALIA) 250 MG tablet; Take 2 tablets by mouth on day 1; Take 1 tablet by mouth on days 2-5  Dispense: 6 tablet; Refill: 0  - predniSONE (DELTASONE) 20 MG tablet; Take 2 tablets (40 mg total) by mouth once daily. for 5 days  Dispense: 10 tablet; Refill: 0  - ipratropium-albuteroL (COMBIVENT)  mcg/actuation inhaler; Inhale 2 puffs into the lungs every 6 (six) hours as needed for Wheezing or Shortness of Breath. Rescue  Dispense: 4 g; Refill: 3  - X-Ray Chest PA And Lateral; Future      Follow-up: with PCP pending blood work results    A total of 35 minutes was spent on patient care during this encounter which included chart review, examining the patient, formulating a treatment plan and documentation.      Medical decision making straight forward and not complex during this visit.     Case discussed with staff: Dr. Masoud Cifuentes DO  [facility], PGY-3

## 2022-12-07 ENCOUNTER — HOSPITAL ENCOUNTER (OUTPATIENT)
Dept: RADIOLOGY | Facility: HOSPITAL | Age: 57
Discharge: HOME | End: 2022-12-07
Attending: STUDENT IN AN ORGANIZED HEALTH CARE EDUCATION/TRAINING PROGRAM
Payer: MEDICAID

## 2022-12-07 ENCOUNTER — HOSPITAL ENCOUNTER (OUTPATIENT)
Facility: HOSPITAL | Age: 57
Discharge: HOME | End: 2022-12-08
Attending: EMERGENCY MEDICINE | Admitting: EMERGENCY MEDICINE
Payer: MEDICAID

## 2022-12-07 ENCOUNTER — TELEPHONE (OUTPATIENT)
Dept: FAMILY MEDICINE | Facility: HOSPITAL | Age: 57
End: 2022-12-07
Payer: MEDICAID

## 2022-12-07 DIAGNOSIS — N17.9 AKI (ACUTE KIDNEY INJURY): ICD-10-CM

## 2022-12-07 DIAGNOSIS — M32.9 LUPUS: Primary | ICD-10-CM

## 2022-12-07 DIAGNOSIS — I10 ESSENTIAL HYPERTENSION: ICD-10-CM

## 2022-12-07 DIAGNOSIS — J44.1 COPD EXACERBATION: ICD-10-CM

## 2022-12-07 DIAGNOSIS — J44.1 COPD WITH ACUTE EXACERBATION: ICD-10-CM

## 2022-12-07 LAB
ALBUMIN SERPL BCP-MCNC: 3.8 G/DL (ref 3.5–5.2)
ALP SERPL-CCNC: 96 U/L (ref 55–135)
ALT SERPL W/O P-5'-P-CCNC: 11 U/L (ref 10–44)
ANION GAP SERPL CALC-SCNC: 13 MMOL/L (ref 8–16)
AST SERPL-CCNC: 12 U/L (ref 10–40)
BASOPHILS # BLD AUTO: 0.01 K/UL (ref 0–0.2)
BASOPHILS NFR BLD: 0.2 % (ref 0–1.9)
BILIRUB SERPL-MCNC: 0.3 MG/DL (ref 0.1–1)
BUN SERPL-MCNC: 40 MG/DL (ref 6–20)
CALCIUM SERPL-MCNC: 9 MG/DL (ref 8.7–10.5)
CHLORIDE SERPL-SCNC: 111 MMOL/L (ref 95–110)
CO2 SERPL-SCNC: 18 MMOL/L (ref 23–29)
CREAT SERPL-MCNC: 2.6 MG/DL (ref 0.5–1.4)
DIFFERENTIAL METHOD: ABNORMAL
EOSINOPHIL # BLD AUTO: 0 K/UL (ref 0–0.5)
EOSINOPHIL NFR BLD: 0 % (ref 0–8)
ERYTHROCYTE [DISTWIDTH] IN BLOOD BY AUTOMATED COUNT: 14.7 % (ref 11.5–14.5)
EST. GFR  (NO RACE VARIABLE): 21 ML/MIN/1.73 M^2
GLUCOSE SERPL-MCNC: 112 MG/DL (ref 70–110)
HCT VFR BLD AUTO: 33.6 % (ref 37–48.5)
HGB BLD-MCNC: 10.6 G/DL (ref 12–16)
IMM GRANULOCYTES # BLD AUTO: 0.02 K/UL (ref 0–0.04)
IMM GRANULOCYTES NFR BLD AUTO: 0.3 % (ref 0–0.5)
LYMPHOCYTES # BLD AUTO: 0.8 K/UL (ref 1–4.8)
LYMPHOCYTES NFR BLD: 11.7 % (ref 18–48)
MAGNESIUM SERPL-MCNC: 1.9 MG/DL (ref 1.6–2.6)
MCH RBC QN AUTO: 26 PG (ref 27–31)
MCHC RBC AUTO-ENTMCNC: 31.5 G/DL (ref 32–36)
MCV RBC AUTO: 83 FL (ref 82–98)
MONOCYTES # BLD AUTO: 0.4 K/UL (ref 0.3–1)
MONOCYTES NFR BLD: 6 % (ref 4–15)
NEUTROPHILS # BLD AUTO: 5.3 K/UL (ref 1.8–7.7)
NEUTROPHILS NFR BLD: 81.8 % (ref 38–73)
NRBC BLD-RTO: 0 /100 WBC
PHOSPHATE SERPL-MCNC: 3.3 MG/DL (ref 2.7–4.5)
PLATELET # BLD AUTO: 146 K/UL (ref 150–450)
PMV BLD AUTO: 12.2 FL (ref 9.2–12.9)
POTASSIUM SERPL-SCNC: 4.5 MMOL/L (ref 3.5–5.1)
PROT SERPL-MCNC: 7.1 G/DL (ref 6–8.4)
RBC # BLD AUTO: 4.07 M/UL (ref 4–5.4)
SODIUM SERPL-SCNC: 142 MMOL/L (ref 136–145)
WBC # BLD AUTO: 6.49 K/UL (ref 3.9–12.7)

## 2022-12-07 PROCEDURE — 84100 ASSAY OF PHOSPHORUS: CPT | Performed by: PHYSICIAN ASSISTANT

## 2022-12-07 PROCEDURE — 63600175 PHARM REV CODE 636 W HCPCS: Performed by: EMERGENCY MEDICINE

## 2022-12-07 PROCEDURE — 80053 COMPREHEN METABOLIC PANEL: CPT | Mod: 91 | Performed by: PHYSICIAN ASSISTANT

## 2022-12-07 PROCEDURE — 71046 XR CHEST PA AND LATERAL: ICD-10-PCS | Mod: 26,,, | Performed by: RADIOLOGY

## 2022-12-07 PROCEDURE — 71046 X-RAY EXAM CHEST 2 VIEWS: CPT | Mod: 26,,, | Performed by: RADIOLOGY

## 2022-12-07 PROCEDURE — G0378 HOSPITAL OBSERVATION PER HR: HCPCS

## 2022-12-07 PROCEDURE — 83735 ASSAY OF MAGNESIUM: CPT | Performed by: PHYSICIAN ASSISTANT

## 2022-12-07 PROCEDURE — 85025 COMPLETE CBC W/AUTO DIFF WBC: CPT | Mod: 91 | Performed by: PHYSICIAN ASSISTANT

## 2022-12-07 PROCEDURE — 71046 X-RAY EXAM CHEST 2 VIEWS: CPT | Mod: TC,FY

## 2022-12-07 PROCEDURE — 99285 EMERGENCY DEPT VISIT HI MDM: CPT | Mod: 25

## 2022-12-07 RX ADMIN — SODIUM CHLORIDE, SODIUM LACTATE, POTASSIUM CHLORIDE, AND CALCIUM CHLORIDE 1000 ML: .6; .31; .03; .02 INJECTION, SOLUTION INTRAVENOUS at 11:12

## 2022-12-07 NOTE — Clinical Note
Diagnosis: THOMAS (acute kidney injury) [906498]   Future Attending Provider: HAI FOLEY [[id number]]   Admitting Provider:: LEIGH CHAVIS [[id number]]

## 2022-12-07 NOTE — TELEPHONE ENCOUNTER
Called patient to discuss lab and CXR results. CXR without acute process so patient appears to have COPD exacerbation without pneumonia. CBC unremarkable. CMP with significant THOMAS, Cr 2.9 with baseline 1.3-1.5. Likely 2/2 hypovolemia with acute illness. Recommended patient present to the ED for evaluation and treatment of COPD exacerbation and THOMAS.     Pt also reported she was never able to get combivent inhaler, will await PA for inhaler but discussed need to tell hospital providers in the hopes they can get her the medication prior to discharge if she is admitted.     Pt verbalized understanding and was in agreement with the plan.      Miki Cifuentes,   \[facility]\"" Family Medicine, PGY-3

## 2022-12-07 NOTE — TELEPHONE ENCOUNTER
----- Message from Aminta Phillips sent at 12/7/2022  9:20 AM CST -----  Patient requested prior authorization on the following medicine,      ipratropium-albuteroL (COMBIVENT)  mcg/actuation inhaler        [id number] - [place], [place] - [address] AT [facility] [address]   Phone:  [phone]  Fax:  [phone]

## 2022-12-08 VITALS
RESPIRATION RATE: 12 BRPM | WEIGHT: 132 LBS | OXYGEN SATURATION: 98 % | DIASTOLIC BLOOD PRESSURE: 116 MMHG | HEART RATE: 75 BPM | TEMPERATURE: 99 F | BODY MASS INDEX: 24.14 KG/M2 | SYSTOLIC BLOOD PRESSURE: 149 MMHG

## 2022-12-08 PROBLEM — N17.9 AKI (ACUTE KIDNEY INJURY): Status: RESOLVED | Noted: 2022-12-08 | Resolved: 2022-12-08

## 2022-12-08 PROBLEM — J44.1 COPD EXACERBATION: Status: RESOLVED | Noted: 2022-12-08 | Resolved: 2022-12-08

## 2022-12-08 PROBLEM — N17.9 AKI (ACUTE KIDNEY INJURY): Status: ACTIVE | Noted: 2022-12-08

## 2022-12-08 PROBLEM — J44.1 COPD EXACERBATION: Status: ACTIVE | Noted: 2022-12-08

## 2022-12-08 LAB
ALBUMIN SERPL BCP-MCNC: 3.5 G/DL (ref 3.5–5.2)
ALP SERPL-CCNC: 75 U/L (ref 55–135)
ALT SERPL W/O P-5'-P-CCNC: 9 U/L (ref 10–44)
ANION GAP SERPL CALC-SCNC: 9 MMOL/L (ref 8–16)
ANION GAP SERPL CALC-SCNC: 9 MMOL/L (ref 8–16)
AST SERPL-CCNC: 13 U/L (ref 10–40)
BASOPHILS # BLD AUTO: 0.01 K/UL (ref 0–0.2)
BASOPHILS NFR BLD: 0.2 % (ref 0–1.9)
BILIRUB SERPL-MCNC: 0.4 MG/DL (ref 0.1–1)
BILIRUB UR QL STRIP: NEGATIVE
BNP SERPL-MCNC: 304 PG/ML (ref 0–99)
BUN SERPL-MCNC: 30 MG/DL (ref 6–20)
BUN SERPL-MCNC: 32 MG/DL (ref 6–20)
CALCIUM SERPL-MCNC: 8.9 MG/DL (ref 8.7–10.5)
CALCIUM SERPL-MCNC: 9.1 MG/DL (ref 8.7–10.5)
CHLORIDE SERPL-SCNC: 109 MMOL/L (ref 95–110)
CHLORIDE SERPL-SCNC: 110 MMOL/L (ref 95–110)
CLARITY UR: CLEAR
CO2 SERPL-SCNC: 20 MMOL/L (ref 23–29)
CO2 SERPL-SCNC: 22 MMOL/L (ref 23–29)
COLOR UR: YELLOW
CREAT SERPL-MCNC: 1.7 MG/DL (ref 0.5–1.4)
CREAT SERPL-MCNC: 1.9 MG/DL (ref 0.5–1.4)
CREAT UR-MCNC: 72.1 MG/DL (ref 15–325)
DIFFERENTIAL METHOD: ABNORMAL
EOSINOPHIL # BLD AUTO: 0 K/UL (ref 0–0.5)
EOSINOPHIL NFR BLD: 0 % (ref 0–8)
ERYTHROCYTE [DISTWIDTH] IN BLOOD BY AUTOMATED COUNT: 14.6 % (ref 11.5–14.5)
EST. GFR  (NO RACE VARIABLE): 30 ML/MIN/1.73 M^2
EST. GFR  (NO RACE VARIABLE): 34 ML/MIN/1.73 M^2
GLUCOSE SERPL-MCNC: 184 MG/DL (ref 70–110)
GLUCOSE SERPL-MCNC: 97 MG/DL (ref 70–110)
GLUCOSE UR QL STRIP: NEGATIVE
HCT VFR BLD AUTO: 32 % (ref 37–48.5)
HGB BLD-MCNC: 9.8 G/DL (ref 12–16)
HGB UR QL STRIP: NEGATIVE
IMM GRANULOCYTES # BLD AUTO: 0.01 K/UL (ref 0–0.04)
IMM GRANULOCYTES NFR BLD AUTO: 0.2 % (ref 0–0.5)
KETONES UR QL STRIP: NEGATIVE
LEUKOCYTE ESTERASE UR QL STRIP: NEGATIVE
LYMPHOCYTES # BLD AUTO: 1.2 K/UL (ref 1–4.8)
LYMPHOCYTES NFR BLD: 25.2 % (ref 18–48)
MAGNESIUM SERPL-MCNC: 1.8 MG/DL (ref 1.6–2.6)
MCH RBC QN AUTO: 25.7 PG (ref 27–31)
MCHC RBC AUTO-ENTMCNC: 30.6 G/DL (ref 32–36)
MCV RBC AUTO: 84 FL (ref 82–98)
MONOCYTES # BLD AUTO: 0.3 K/UL (ref 0.3–1)
MONOCYTES NFR BLD: 5.8 % (ref 4–15)
NEUTROPHILS # BLD AUTO: 3.2 K/UL (ref 1.8–7.7)
NEUTROPHILS NFR BLD: 68.6 % (ref 38–73)
NITRITE UR QL STRIP: NEGATIVE
NRBC BLD-RTO: 0 /100 WBC
OSMOLALITY UR: 441 MOSM/KG (ref 50–1200)
PH UR STRIP: 6 [PH] (ref 5–8)
PHOSPHATE SERPL-MCNC: 2.9 MG/DL (ref 2.7–4.5)
PLATELET # BLD AUTO: 113 K/UL (ref 150–450)
PMV BLD AUTO: 11.8 FL (ref 9.2–12.9)
POCT GLUCOSE: 134 MG/DL (ref 70–110)
POCT GLUCOSE: 89 MG/DL (ref 70–110)
POTASSIUM SERPL-SCNC: 4 MMOL/L (ref 3.5–5.1)
POTASSIUM SERPL-SCNC: 4.5 MMOL/L (ref 3.5–5.1)
PROT SERPL-MCNC: 6.3 G/DL (ref 6–8.4)
PROT UR QL STRIP: ABNORMAL
RBC # BLD AUTO: 3.82 M/UL (ref 4–5.4)
SODIUM SERPL-SCNC: 139 MMOL/L (ref 136–145)
SODIUM SERPL-SCNC: 140 MMOL/L (ref 136–145)
SODIUM UR-SCNC: 104 MMOL/L (ref 20–250)
SP GR UR STRIP: 1.01 (ref 1–1.03)
T4 FREE SERPL-MCNC: 0.96 NG/DL (ref 0.71–1.51)
TSH SERPL DL<=0.005 MIU/L-ACNC: 0.13 UIU/ML (ref 0.4–4)
URN SPEC COLLECT METH UR: ABNORMAL
UROBILINOGEN UR STRIP-ACNC: NEGATIVE EU/DL
UUN UR-MCNC: 394 MG/DL (ref 140–1050)
WBC # BLD AUTO: 4.68 K/UL (ref 3.9–12.7)

## 2022-12-08 PROCEDURE — 84100 ASSAY OF PHOSPHORUS: CPT

## 2022-12-08 PROCEDURE — 85025 COMPLETE CBC W/AUTO DIFF WBC: CPT

## 2022-12-08 PROCEDURE — 96375 TX/PRO/DX INJ NEW DRUG ADDON: CPT

## 2022-12-08 PROCEDURE — 84439 ASSAY OF FREE THYROXINE: CPT

## 2022-12-08 PROCEDURE — 84443 ASSAY THYROID STIM HORMONE: CPT

## 2022-12-08 PROCEDURE — 94640 AIRWAY INHALATION TREATMENT: CPT

## 2022-12-08 PROCEDURE — 63600175 PHARM REV CODE 636 W HCPCS

## 2022-12-08 PROCEDURE — 84540 ASSAY OF URINE/UREA-N: CPT | Performed by: EMERGENCY MEDICINE

## 2022-12-08 PROCEDURE — 80053 COMPREHEN METABOLIC PANEL: CPT

## 2022-12-08 PROCEDURE — 80048 BASIC METABOLIC PNL TOTAL CA: CPT | Mod: XB

## 2022-12-08 PROCEDURE — 96366 THER/PROPH/DIAG IV INF ADDON: CPT

## 2022-12-08 PROCEDURE — G0378 HOSPITAL OBSERVATION PER HR: HCPCS

## 2022-12-08 PROCEDURE — 94761 N-INVAS EAR/PLS OXIMETRY MLT: CPT

## 2022-12-08 PROCEDURE — 96361 HYDRATE IV INFUSION ADD-ON: CPT

## 2022-12-08 PROCEDURE — 83880 ASSAY OF NATRIURETIC PEPTIDE: CPT

## 2022-12-08 PROCEDURE — 25000003 PHARM REV CODE 250

## 2022-12-08 PROCEDURE — 81003 URINALYSIS AUTO W/O SCOPE: CPT | Performed by: PHYSICIAN ASSISTANT

## 2022-12-08 PROCEDURE — 84300 ASSAY OF URINE SODIUM: CPT | Performed by: EMERGENCY MEDICINE

## 2022-12-08 PROCEDURE — 96365 THER/PROPH/DIAG IV INF INIT: CPT

## 2022-12-08 PROCEDURE — 83935 ASSAY OF URINE OSMOLALITY: CPT | Performed by: EMERGENCY MEDICINE

## 2022-12-08 PROCEDURE — 82570 ASSAY OF URINE CREATININE: CPT | Performed by: EMERGENCY MEDICINE

## 2022-12-08 PROCEDURE — 63700000 PHARM REV CODE 250 ALT 637 W/O HCPCS

## 2022-12-08 PROCEDURE — 25000242 PHARM REV CODE 250 ALT 637 W/ HCPCS

## 2022-12-08 PROCEDURE — 83735 ASSAY OF MAGNESIUM: CPT

## 2022-12-08 PROCEDURE — 82962 GLUCOSE BLOOD TEST: CPT

## 2022-12-08 RX ORDER — IPRATROPIUM BROMIDE AND ALBUTEROL SULFATE 2.5; .5 MG/3ML; MG/3ML
3 SOLUTION RESPIRATORY (INHALATION) ONCE
Status: COMPLETED | OUTPATIENT
Start: 2022-12-08 | End: 2022-12-08

## 2022-12-08 RX ORDER — IBUPROFEN 200 MG
1 TABLET ORAL DAILY
Status: DISCONTINUED | OUTPATIENT
Start: 2022-12-08 | End: 2022-12-09 | Stop reason: HOSPADM

## 2022-12-08 RX ORDER — SODIUM CHLORIDE 0.9 % (FLUSH) 0.9 %
10 SYRINGE (ML) INJECTION EVERY 12 HOURS PRN
Status: DISCONTINUED | OUTPATIENT
Start: 2022-12-08 | End: 2022-12-09 | Stop reason: HOSPADM

## 2022-12-08 RX ORDER — MAGNESIUM SULFATE HEPTAHYDRATE 40 MG/ML
2 INJECTION, SOLUTION INTRAVENOUS ONCE
Status: COMPLETED | OUTPATIENT
Start: 2022-12-08 | End: 2022-12-08

## 2022-12-08 RX ORDER — INSULIN ASPART 100 [IU]/ML
1-10 INJECTION, SOLUTION INTRAVENOUS; SUBCUTANEOUS
Status: DISCONTINUED | OUTPATIENT
Start: 2022-12-08 | End: 2022-12-09 | Stop reason: HOSPADM

## 2022-12-08 RX ORDER — NALOXONE HCL 0.4 MG/ML
0.02 VIAL (ML) INJECTION
Status: DISCONTINUED | OUTPATIENT
Start: 2022-12-08 | End: 2022-12-09 | Stop reason: HOSPADM

## 2022-12-08 RX ORDER — SODIUM CHLORIDE, SODIUM LACTATE, POTASSIUM CHLORIDE, CALCIUM CHLORIDE 600; 310; 30; 20 MG/100ML; MG/100ML; MG/100ML; MG/100ML
INJECTION, SOLUTION INTRAVENOUS CONTINUOUS
Status: ACTIVE | OUTPATIENT
Start: 2022-12-08 | End: 2022-12-08

## 2022-12-08 RX ORDER — IPRATROPIUM BROMIDE AND ALBUTEROL SULFATE 2.5; .5 MG/3ML; MG/3ML
3 SOLUTION RESPIRATORY (INHALATION) EVERY 4 HOURS PRN
Status: DISCONTINUED | OUTPATIENT
Start: 2022-12-08 | End: 2022-12-09 | Stop reason: HOSPADM

## 2022-12-08 RX ORDER — PREDNISONE 20 MG/1
40 TABLET ORAL DAILY
Status: DISCONTINUED | OUTPATIENT
Start: 2022-12-08 | End: 2022-12-09 | Stop reason: HOSPADM

## 2022-12-08 RX ORDER — METOPROLOL SUCCINATE 50 MG/1
50 TABLET, EXTENDED RELEASE ORAL DAILY
Qty: 30 TABLET | Refills: 11 | Status: SHIPPED | OUTPATIENT
Start: 2022-12-08 | End: 2023-11-28 | Stop reason: SDUPTHER

## 2022-12-08 RX ORDER — AZITHROMYCIN 250 MG/1
250 TABLET, FILM COATED ORAL DAILY
Status: DISCONTINUED | OUTPATIENT
Start: 2022-12-08 | End: 2022-12-09 | Stop reason: HOSPADM

## 2022-12-08 RX ORDER — GLUCAGON 1 MG
1 KIT INJECTION
Status: DISCONTINUED | OUTPATIENT
Start: 2022-12-08 | End: 2022-12-09 | Stop reason: HOSPADM

## 2022-12-08 RX ORDER — TALC
6 POWDER (GRAM) TOPICAL NIGHTLY PRN
Status: DISCONTINUED | OUTPATIENT
Start: 2022-12-08 | End: 2022-12-09 | Stop reason: HOSPADM

## 2022-12-08 RX ORDER — NIFEDIPINE 30 MG/1
60 TABLET, EXTENDED RELEASE ORAL DAILY
Status: DISCONTINUED | OUTPATIENT
Start: 2022-12-08 | End: 2022-12-09 | Stop reason: HOSPADM

## 2022-12-08 RX ORDER — LOSARTAN POTASSIUM 50 MG/1
100 TABLET ORAL DAILY
Status: DISCONTINUED | OUTPATIENT
Start: 2022-12-08 | End: 2022-12-09 | Stop reason: HOSPADM

## 2022-12-08 RX ORDER — HYDRALAZINE HYDROCHLORIDE 25 MG/1
50 TABLET, FILM COATED ORAL 3 TIMES DAILY
Status: DISCONTINUED | OUTPATIENT
Start: 2022-12-08 | End: 2022-12-09 | Stop reason: HOSPADM

## 2022-12-08 RX ORDER — ACETAMINOPHEN 325 MG/1
650 TABLET ORAL EVERY 8 HOURS PRN
Status: DISCONTINUED | OUTPATIENT
Start: 2022-12-08 | End: 2022-12-09 | Stop reason: HOSPADM

## 2022-12-08 RX ORDER — ONDANSETRON 2 MG/ML
4 INJECTION INTRAMUSCULAR; INTRAVENOUS EVERY 8 HOURS PRN
Status: DISCONTINUED | OUTPATIENT
Start: 2022-12-08 | End: 2022-12-09 | Stop reason: HOSPADM

## 2022-12-08 RX ORDER — ATORVASTATIN CALCIUM 40 MG/1
40 TABLET, FILM COATED ORAL DAILY
Status: DISCONTINUED | OUTPATIENT
Start: 2022-12-08 | End: 2022-12-09 | Stop reason: HOSPADM

## 2022-12-08 RX ORDER — HYDROXYCHLOROQUINE SULFATE 200 MG/1
200 TABLET, FILM COATED ORAL 2 TIMES DAILY
Status: DISCONTINUED | OUTPATIENT
Start: 2022-12-08 | End: 2022-12-09 | Stop reason: HOSPADM

## 2022-12-08 RX ADMIN — AZITHROMYCIN MONOHYDRATE 250 MG: 250 TABLET ORAL at 09:12

## 2022-12-08 RX ADMIN — ONDANSETRON 4 MG: 2 INJECTION INTRAMUSCULAR; INTRAVENOUS at 02:12

## 2022-12-08 RX ADMIN — Medication 6 MG: at 02:12

## 2022-12-08 RX ADMIN — LOSARTAN POTASSIUM 100 MG: 50 TABLET, FILM COATED ORAL at 09:12

## 2022-12-08 RX ADMIN — NIFEDIPINE 60 MG: 30 TABLET, FILM COATED, EXTENDED RELEASE ORAL at 09:12

## 2022-12-08 RX ADMIN — MAGNESIUM SULFATE 2 G: 2 INJECTION INTRAVENOUS at 08:12

## 2022-12-08 RX ADMIN — HYDRALAZINE HYDROCHLORIDE 50 MG: 25 TABLET, FILM COATED ORAL at 09:12

## 2022-12-08 RX ADMIN — PREDNISONE 40 MG: 20 TABLET ORAL at 09:12

## 2022-12-08 RX ADMIN — HYDROXYCHLOROQUINE SULFATE 200 MG: 200 TABLET, FILM COATED ORAL at 09:12

## 2022-12-08 RX ADMIN — HYDRALAZINE HYDROCHLORIDE 50 MG: 25 TABLET, FILM COATED ORAL at 03:12

## 2022-12-08 RX ADMIN — SODIUM CHLORIDE, SODIUM LACTATE, POTASSIUM CHLORIDE, AND CALCIUM CHLORIDE: .6; .31; .03; .02 INJECTION, SOLUTION INTRAVENOUS at 05:12

## 2022-12-08 RX ADMIN — IPRATROPIUM BROMIDE AND ALBUTEROL SULFATE 3 ML: .5; 2.5 SOLUTION RESPIRATORY (INHALATION) at 09:12

## 2022-12-08 RX ADMIN — ATORVASTATIN CALCIUM 40 MG: 40 TABLET, FILM COATED ORAL at 09:12

## 2022-12-08 NOTE — HOSPITAL COURSE
Ms. Rasheed initially presented to the ED with 1 week of increased cough and breathlessness. She had a workup at [facility] clinic and was being treated for a COPD exacerbation with prednisone and azithromycin. She was also found to have an THOMAS. Her creatinine was elevated to 2.6 from her baseline of ~1.5 upon admission but had continued to improve after fluid resuscitation. She stated that her respiratory symptoms were much improved from the previous days. Afternoon BNP showed Cr 1.7 consistent with baseline. Patient tolerating PO food and liquid intake and requested to be discharged. I stressed the importance of continued fluid intake, smoking cessation, and close fu with her PCP. Patient had been referred to nephrology in the past given history of lupus and worsening kidney function. A repeat referral was placed and explained to the patient the importance of making this appointment. She voiced understanding of the plan.

## 2022-12-08 NOTE — ED NOTES
Nurse introduced self to patient. Let her know I was resuming care. Patient updated on plan of care. Patient is AAOx4.

## 2022-12-08 NOTE — ASSESSMENT & PLAN NOTE
Patient with hx of APS and Protein C deficiency on chronic xarelto  Will renally dose xarelto during admission if able

## 2022-12-08 NOTE — SUBJECTIVE & OBJECTIVE
Past Medical History:   Diagnosis Date    Diabetes mellitus type II     Headache associated with hormonal factors     Hyperlipidemia     Hypertension     Lupus     Protein C deficiency     Tachycardia        Past Surgical History:   Procedure Laterality Date    AORTIC VALVE SURGERY       SECTION      COLONOSCOPY N/A 2017    Procedure: COLONOSCOPY;  Surgeon: Markos Calvert MD;  Location: [facility];  Service: Endoscopy;  Laterality: N/A;    TOE AMPUTATION      TONSILLECTOMY      WRIST FRACTURE SURGERY         Review of patient's allergies indicates:   Allergen Reactions    Niacin preparations Other (See Comments)     Red inflammed eyes    Codeine Rash and Other (See Comments)       No current facility-administered medications on file prior to encounter.     Current Outpatient Medications on File Prior to Encounter   Medication Sig    albuterol (PROVENTIL/VENTOLIN HFA) 90 mcg/actuation inhaler Inhale 2 puffs into the lungs every 6 (six) hours as needed for Wheezing or Shortness of Breath. Rescue    atorvastatin (LIPITOR) 40 MG tablet Take 1 tablet (40 mg total) by mouth once daily.    azithromycin (Z-ROSALIA) 250 MG tablet Take 2 tablets by mouth on day 1; Take 1 tablet by mouth on days 2-5    fexofenadine (ALLEGRA) 60 MG tablet Take 1 tablet (60 mg total) by mouth once daily.    gabapentin (NEURONTIN) 600 MG tablet Take 2 tablets (1,200 mg total) by mouth 3 (three) times daily.    hydrALAZINE (APRESOLINE) 50 MG tablet Take 1 tablet (50 mg total) by mouth 3 (three) times daily.    hydrOXYchloroQUINE (PLAQUENIL) 200 mg tablet Take 1 tablet (200 mg total) by mouth 2 (two) times daily.    ipratropium-albuteroL (COMBIVENT)  mcg/actuation inhaler Inhale 2 puffs into the lungs every 6 (six) hours as needed for Wheezing or Shortness of Breath. Rescue    levocetirizine (XYZAL) 5 MG tablet Take 1 tablet (5 mg total) by mouth daily as needed for Allergies.    losartan (COZAAR) 100 MG tablet Take 1 tablet (100  mg total) by mouth once daily.    metoprolol succinate (TOPROL-XL) 50 MG 24 hr tablet Take 1 tablet (50 mg total) by mouth once daily.    NIFEdipine (PROCARDIA-XL) 60 MG (OSM) 24 hr tablet Take 1 tablet (60 mg total) by mouth once daily.    omeprazole (PRILOSEC) 40 MG capsule Take 1 capsule (40 mg total) by mouth once daily.    ondansetron (ZOFRAN) 4 MG tablet Take 1 tablet (4 mg total) by mouth every 12 (twelve) hours as needed for Nausea.    predniSONE (DELTASONE) 20 MG tablet Take 2 tablets (40 mg total) by mouth once daily. for 5 days    rivaroxaban (XARELTO) 20 mg Tab Take 1 tablet (20 mg total) by mouth daily with dinner or evening meal.    SITagliptin (JANUVIA) 50 MG Tab Take 1 tablet (50 mg total) by mouth once daily.     Family History       Problem Relation (Age of Onset)    Cancer Father    Colon cancer Paternal Aunt    Diabetes Maternal Uncle    Hypertension Mother    Stroke Maternal Grandfather          Tobacco Use    Smoking status: Every Day     Packs/day: 1.00     Years: 35.00     Pack years: 35.00     Types: Cigarettes    Smokeless tobacco: Never   Substance and Sexual Activity    Alcohol use: No    Drug use: No    Sexual activity: Not on file     Review of Systems   Constitutional:  Positive for activity change, appetite change and fatigue. Negative for chills and diaphoresis.   HENT:  Negative for sinus pressure, sinus pain and sore throat.    Eyes:  Negative for photophobia and visual disturbance.   Respiratory:  Positive for cough, chest tightness, shortness of breath and wheezing.    Cardiovascular:  Negative for chest pain, palpitations and leg swelling.   Gastrointestinal:  Positive for nausea. Negative for abdominal pain, constipation, diarrhea and vomiting.   Genitourinary:  Negative for dysuria and flank pain.   Musculoskeletal:  Negative for arthralgias and back pain.   Skin:  Negative for color change and wound.   Neurological:  Negative for dizziness, syncope, light-headedness and  headaches.   Psychiatric/Behavioral: Negative.     Objective:     Vital Signs (Most Recent):  Temp: 98.4 °F (36.9 °C) (12/08/22 0224)  Pulse: 85 (12/08/22 0224)  Resp: 18 (12/08/22 0224)  BP: (!) 146/64 (12/08/22 0224)  SpO2: 97 % (12/08/22 0224)   Vital Signs (24h Range):  Temp:  [98.4 °F (36.9 °C)-98.8 °F (37.1 °C)] 98.4 °F (36.9 °C)  Pulse:  [] 85  Resp:  [15-18] 18  SpO2:  [96 %-98 %] 97 %  BP: (119-146)/(53-83) 146/64     Weight: 59.9 kg (132 lb)  Body mass index is 24.14 kg/m².    Physical Exam  Constitutional:       General: She is not in acute distress.     Appearance: Normal appearance. She is not toxic-appearing.   HENT:      Head: Normocephalic and atraumatic.      Right Ear: External ear normal.      Left Ear: External ear normal.      Nose: Nose normal.      Mouth/Throat:      Mouth: Mucous membranes are moist.   Eyes:      Pupils: Pupils are equal, round, and reactive to light.   Cardiovascular:      Rate and Rhythm: Normal rate and regular rhythm.      Pulses: Normal pulses.      Heart sounds: Normal heart sounds.   Pulmonary:      Effort: Pulmonary effort is normal. No respiratory distress.      Breath sounds: Wheezing present.      Comments: Wheezing heard in mid/lower lung bases bilaterally   Abdominal:      General: There is no distension.      Palpations: Abdomen is soft.      Tenderness: There is no abdominal tenderness. There is no guarding or rebound.   Musculoskeletal:         General: No swelling.   Skin:     General: Skin is warm.      Capillary Refill: Capillary refill takes less than 2 seconds.   Neurological:      General: No focal deficit present.      Mental Status: She is alert.   Psychiatric:         Mood and Affect: Mood normal.         CRANIAL NERVES     CN III, IV, VI   Pupils are equal, round, and reactive to light.     Significant Labs: All pertinent labs within the past 24 hours have been reviewed.  CBC:   Recent Labs   Lab 12/07/22  0840 12/07/22  2106   WBC 4.65 6.49    HGB 11.6* 10.6*   HCT 37.9 33.6*    146*     CMP:   Recent Labs   Lab 12/07/22  0840 12/07/22  2106    142   K 4.5 4.5    111*   CO2 14* 18*   * 112*   BUN 35* 40*   CREATININE 2.9* 2.6*   CALCIUM 9.1 9.0   PROT 7.5 7.1   ALBUMIN 4.0 3.8   BILITOT 0.4 0.3   ALKPHOS 98 96   AST 14 12   ALT 13 11   ANIONGAP 15 13       Significant Imaging: I have reviewed all pertinent imaging results/findings within the past 24 hours.

## 2022-12-08 NOTE — PLAN OF CARE
[facility] - Emergency Dept  Initial Discharge Assessment       Primary Care Provider: June Mathew MD    Admission Diagnosis: THOMAS (acute kidney injury) [N17.9]    Admission Date: 12/7/2022  Expected Discharge Date:     Discharge Barriers Identified: (P) None    Payor: MEDICAID / Plan: AETNA [facility] / Product Type: Managed Medicaid /     Extended Emergency Contact Information  Primary Emergency Contact: Delphine Strickland  Home Phone: [phone]  Relation: Mother    Discharge Plan A: (P) Home  Discharge Plan B: (P) Home with family      [id number] - [place] - [address] AT [facility] [address]  [address]  [place] [address]  Phone: [phone] Fax: [phone]    [facility] [place]  [address]  [place] [address]  Phone: [phone] Fax: [phone]      Initial Assessment (most recent)       Adult Discharge Assessment - 12/08/22 1359          Discharge Assessment    Assessment Type Discharge Planning Assessment     Confirmed/corrected address, phone number and insurance Yes     Source of Information patient     People in Home parent(s)     Do you expect to return to your current living situation? Yes     Prior to hospitilization cognitive status: Alert/Oriented     Current cognitive status: Alert/Oriented     Equipment Currently Used at Home none     Patient currently being followed by outpatient case management? Unable to determine (comments) (P)      Do you currently have service(s) that help you manage your care at home? No (P)      Do you take prescription medications? Yes (P)      Do you have prescription coverage? Yes (P)      Coverage [employer] (P)      Do you have any problems affording any of your prescribed medications? No (P)      Is the patient taking medications as prescribed? yes (P)      Who is going to help you get home at discharge? pt states she will drive herself home, will call son if unable (P)       How do you get to doctors appointments? car, drives self (P)      Discharge Plan A Home (P)      Discharge Plan B Home with family (P)      DME Needed Upon Discharge  none (P)      Discharge Plan discussed with: Patient (P)      Discharge Barriers Identified None (P)         OTHER    Name(s) of People in Home Delphine Strickland (Mother)   [phone] (P)                       Becca Madera, [facility]  [place] Social Work  [phone]

## 2022-12-08 NOTE — FIRST PROVIDER EVALUATION
Emergency Department TeleTriage Encounter Note      CHIEF COMPLAINT    Chief Complaint   Patient presents with    Abnormal Labs     Was referred by MD for elevated creatinine. States has doubled since last time having blood work a few months ago. History of stent to right kidney. States she has been having cold symptoms since last Monday. Reports possibly feeling dehydrated. Was swabbed for covid and flu. Has not received results.        VITAL SIGNS   Initial Vitals [12/07/22 1948]   BP Pulse Resp Temp SpO2   (!) 120/57 100 17 98.8 °F (37.1 °C) 96 %      MAP       --            ALLERGIES    Review of patient's allergies indicates:   Allergen Reactions    Niacin preparations Other (See Comments)     Red inflammed eyes    Codeine Rash and Other (See Comments)       PROVIDER TRIAGE NOTE  This is a teletriage evaluation of a 57 y.o. female presenting to the ED complaining of THOMAS.     Initial orders will be placed and care will be transferred to an alternate provider when patient is roomed for a full evaluation. Any additional orders and the final disposition will be determined by that provider.         ORDERS  Labs Reviewed   CBC W/ AUTO DIFFERENTIAL   COMPREHENSIVE METABOLIC PANEL   MAGNESIUM   PHOSPHORUS   URINALYSIS, REFLEX TO URINE CULTURE       ED Orders (720h ago, onward)      Start Ordered     Status Ordering Provider    12/07/22 2037 12/07/22 2037  Saline lock IV  Once         Ordered ALEJA FREEMAN    12/07/22 2037 12/07/22 2037  CBC auto differential  STAT         Ordered ALEJA FREEMAN.    12/07/22 2037 12/07/22 2037  Comprehensive metabolic panel  STAT         Ordered ALEJA FREEMAN    12/07/22 2037 12/07/22 2037  Magnesium  STAT         Ordered ALEJA FREEMAN.    12/07/22 2037 12/07/22 2037  Phosphorus  Once         Ordered ALEJA FREEMAN.    12/07/22 2037 12/07/22 2037  Urinalysis, Reflex to Urine Culture Urine, Clean Catch  STAT          ALEJA Mejia              Virtual Visit Note: The provider triage portion of this emergency department evaluation and documentation was performed via [employer], a HIPAA-compliant telemedicine application, in concert with a tele-presenter in the room. A face to face patient evaluation with one of my colleagues will occur once the patient is placed in an emergency department room.      DISCLAIMER: This note was prepared with [employer] voice recognition transcription software. Garbled syntax, mangled pronouns, and other bizarre constructions may be attributed to that software system.

## 2022-12-08 NOTE — ASSESSMENT & PLAN NOTE
Patient with recent COPD exacerbation dx with PCP  Patient noting resolving symptoms  CXR with no acute abnormality  Current O2 Requirement: saturating well on RA    PLAN:  - Duo nebs q4h while awake  - Prednisone 40 mg qDaily x5 days (end date 12/11)  - Monitor Pulse Ox q4h  - Azithromycin 500 mg x 1 dose, then 250 mg qDaily x4 days (end date 12/11)

## 2022-12-08 NOTE — ED PROVIDER NOTES
Encounter Date: 2022       History     Chief Complaint   Patient presents with    Abnormal Labs     Was referred by MD for elevated creatinine. States has doubled since last time having blood work a few months ago. History of stent to right kidney. States she has been having cold symptoms since last Monday. Reports possibly feeling dehydrated. Was swabbed for covid and flu. Has not received results.      57-year-old female with past medical history including diabetes, COPD, lupus, renal artery stenosis status post stent placement in , protein C deficiency and antiphospholipid syndrome on Xarelto presents with complaint of THOMAS.  Patient says that she had blood work drawn by her PCP yesterday and was found to have an increased creatinine from her baseline.  She had reports that she has been eating and drinking less recently because she has not been feeling well.  She saw her doctor for this yesterday where she had a chest x-ray done and labs.  Her doctor is treating her for a COPD exacerbation and she says that she is currently feeling improved from that standpoint.  He denies any abdominal pain, nausea, vomiting, diarrhea, dysuria or hematuria.  Says that she has not had any complications since she had the stents placed.  Took her Xarelto just before coming to the ER.    The history is provided by the patient. No  was used.   Review of patient's allergies indicates:   Allergen Reactions    Niacin preparations Other (See Comments)     Red inflammed eyes    Codeine Rash and Other (See Comments)     Past Medical History:   Diagnosis Date    Diabetes mellitus type II     Headache associated with hormonal factors     Hyperlipidemia     Hypertension     Lupus     Protein C deficiency     Tachycardia      Past Surgical History:   Procedure Laterality Date    AORTIC VALVE SURGERY       SECTION      COLONOSCOPY N/A 2017    Procedure: COLONOSCOPY;  Surgeon: Markos Calvert MD;   Location: [facility];  Service: Endoscopy;  Laterality: N/A;    TOE AMPUTATION      TONSILLECTOMY      WRIST FRACTURE SURGERY       Family History   Problem Relation Age of Onset    Hypertension Mother     Cancer Father     Diabetes Maternal Uncle     Stroke Maternal Grandfather     Colon cancer Paternal Aunt      Social History     Tobacco Use    Smoking status: Every Day     Packs/day: 1.00     Years: 35.00     Pack years: 35.00     Types: Cigarettes    Smokeless tobacco: Never   Substance Use Topics    Alcohol use: No    Drug use: No     Review of Systems   Constitutional:  Positive for appetite change. Negative for chills and fever.   HENT:  Negative for sore throat.    Eyes:  Negative for visual disturbance.   Respiratory:  Positive for shortness of breath (improving).    Cardiovascular:  Negative for chest pain.   Gastrointestinal:  Negative for nausea.   Genitourinary:  Negative for dysuria and hematuria.   Musculoskeletal:  Negative for back pain.   Skin:  Negative for rash.   Neurological:  Negative for weakness.   Hematological:  Does not bruise/bleed easily.   Psychiatric/Behavioral:  Negative for confusion.      Physical Exam     Initial Vitals [12/07/22 1948]   BP Pulse Resp Temp SpO2   (!) 120/57 100 17 98.8 °F (37.1 °C) 96 %      MAP       --         Physical Exam    Nursing note and vitals reviewed.  Constitutional: She appears well-developed. She is not diaphoretic. No distress.   HENT:   Head: Normocephalic and atraumatic.   Eyes: EOM are normal. Pupils are equal, round, and reactive to light.   Neck:   Normal range of motion.  Cardiovascular:  Normal rate.           Pulmonary/Chest: No respiratory distress. She has wheezes (scant expiratory).   Abdominal: Abdomen is soft. She exhibits no distension. There is no abdominal tenderness.   No CVAT.   Musculoskeletal:         General: No edema. Normal range of motion.      Cervical back: Normal range of motion.     Neurological: She is alert and oriented  to person, place, and time.   Skin: Skin is warm and dry.   Psychiatric: She has a normal mood and affect.       ED Course   Procedures  Labs Reviewed   CBC W/ AUTO DIFFERENTIAL - Abnormal; Notable for the following components:       Result Value    Hemoglobin 10.6 (*)     Hematocrit 33.6 (*)     MCH 26.0 (*)     MCHC 31.5 (*)     RDW 14.7 (*)     Platelets 146 (*)     Lymph # 0.8 (*)     Gran % 81.8 (*)     Lymph % 11.7 (*)     All other components within normal limits   COMPREHENSIVE METABOLIC PANEL - Abnormal; Notable for the following components:    Chloride 111 (*)     CO2 18 (*)     Glucose 112 (*)     BUN 40 (*)     Creatinine 2.6 (*)     eGFR 21 (*)     All other components within normal limits   MAGNESIUM   PHOSPHORUS   URINALYSIS, REFLEX TO URINE CULTURE   SODIUM, URINE, RANDOM   CREATININE, URINE, RANDOM          Imaging Results    None          Medications   lactated ringers bolus 1,000 mL (has no administration in time range)     Medical Decision Making:   History:   Old Records Summarized: records from clinic visits.  Clinical Tests:   Lab Tests: Ordered and Reviewed  ED Management:  57-year-old female with past medical history as above presents with elevated creatinine on outpatient labs.  Afebrile, stable vital signs.  Ordered 1 L IVF.  Creatinine doubled from prior.  Suspect dehydration given poor p.o. intake over the past few days.  UA pending at time of admission.  Other:   I have discussed this case with another health care provider.                        Clinical Impression:   Final diagnoses:  [N17.9] THOMAS (acute kidney injury)        ED Disposition Condition    Observation Stable                Aura Gallegos MD  12/07/22 1397     trunk was WFL (within functional limits)

## 2022-12-08 NOTE — ASSESSMENT & PLAN NOTE
Patient on multiple HTN medications  Per chart hx, patient with renal artery stenosis s/p stent in 2008  Continue home hydralazine, losartan, metoprolol, and nifedipine

## 2022-12-08 NOTE — HPI
"Patient is a 57 F with PMH Protein C deficiency (hx of LE DVTs, AAA thrombus s/p emergent repair), antiphospholipid syndrome on Xarelto, DMII, HTN, COPD, and SLE (on hydroxycholorquine) who presented to [facility] ED for 1 week of increased SOB, fatigue, and cough. Patient states this past week she has had increased cough with productive green tinged sputum along with feelings of "breathlessness". She also has been more nauseous and states that she "hasn't really eaten or drank much for the past week". She also noted a temperature at home to 100.4F. [facility] where she was treated for COPD exacerbation and given course of prednisone 40mg, azithromycin, and combivent. Patient states her symptoms are reducing while on this regimen. Patient was also found to have an elevated creatinine to 2.9 on her lab work which prompted the PCP to notify the patient to be seen at [facility] ED.    At [facility] ED, repeat labs were drawn which continued to show an increased creatinine (2.6) from patient's baseline along with a stable CBC (no leukocytosis), and unremarkable UA. Patient was then given 1L LR and [facility] was consulted for admission for continued management of THOMAS in the setting of dehydration/limited PO intake and COPD exacerbation.         "

## 2022-12-08 NOTE — ED NOTES
Patient ambulated to restroom with no problems. States she has been having an adequate amount of urine output.

## 2022-12-08 NOTE — ED NOTES
Patient sitting on edge of stretcher. Patient appears agitated. Requesting to go outside and smoke. Nurse explained hospital protocol. Nicotine patch declined. Nurse offered melatonin. Patient agreed.

## 2022-12-08 NOTE — PLAN OF CARE
Discharge orders noted. No DME or Home Health ordered. Patient declined PCP appointment to be scheduled on assessment completed by KRYSTAL. No further Case Management needs.    Nephrology referral noted in system.  will request follow-up from access navigators.     Patient Contacts    Name Relation Home Work Mobile   Delphine Strickland [phone]       MD June Cedeno MD  PCP - General  Family Medicine  [phone] [address] [place] [address]  Next Steps: Follow up in 1 week(s)     MD Liz Moody MD  Nephrology [phone] [address] [facility] [place] [address]      Next Steps: Follow up  Appointment:   Instructions: Nephrology Appointment Requested. You will be contacted with follow-up information.        12/08/22 1541   Final Note   Assessment Type Final Discharge Note   Anticipated Discharge Disposition Home   Hospital Resources/Appts/Education Provided   (Patient refused follow-up to be scheduled. PCP visit 12/6.)   Post-Acute Status   Discharge Delays None known at this time     Bell Grady RN    [phone]

## 2022-12-08 NOTE — ASSESSMENT & PLAN NOTE
Patient presented with Serum Cr 2.6, with a  baseline Serum Cr of 1.3  Most likely due pre-renal etiology to dehydration and limited PO intake  Initial UA: unremarkable    PLAN:  - S/p 1L LR in ED  - Maintain UOP at 0.5ml/kg/hr  - Promote PO fluid intake  - Hold nephrotoxic medications   - Renally-dose current meds if available  - Bladder scan if suspicion of retention, followed by retroperitoneal US to evaluate for Hydronephrosis.  - Avoid Hypotension  - Strict I/O's  - Monitor Renal Function Carefully  - urine Na / Urea / Creatinine studies pending

## 2022-12-08 NOTE — ED NOTES
Patient received at this time reporting nasal congestions, cough, fever, chills x 1 week now resolved.  Decreased PO intake due to decreased appetite.  Labs drawn today with abnormal kidney function identified and PMD reporting she go to ED for same.  Patient without complaint.  States she last urinated today just prior to arrival.     Pain:  Rated 0/10.     Psychosocial:  Patient is calm and cooperative.  Patients insight and judgement are appropriate to situation.  Appears clean, well maintained, with clothing appropriate to environment.  No evidence of delusions, hallucinations, or psychosis.     Neuro:  Eyes open spontaneously.  Awake, alert, oriented x 4.  Speech clear and appropriate.  Tolerating saliva secretions well.  Able to follow commands, demonstrating ability to actively and appropriately communicate within context of current conversation.  Symmetrical facial muscles.  Moving all extremities well with no noted weakness.  Adequate muscle tone present.    Movement is purposeful.         Airway:  Bilateral chest rise and fall.  RR regular and non-labored.  No crepitus or subcutaneous emphysema noted on palpation.       Circulatory:  Skin warm, dry, and pink.  Apical and radial pulses strong and regular.  Capillary refill/skin blanching less than 3 seconds to distal of 4 extremities.  Placed on CM in SR without ectopy.     Abdomen:  Abdomen soft and non-distended.  Positive normo-active bowel sounds x 4 quadrants.       Urinary:  Patient denies pain, frequency, or urgency.  Voids independently.  Reports urine appears kaylyn/yellow in color.     Extremities:  No redness, heat, swelling, deformity, or pain.     Skin:  Intact with no bruising/discolorations noted.

## 2022-12-08 NOTE — H&P
"[facility] Medicine  History & Physical    Patient Name: Aggie Rasheed  MRN: [id number]  Patient Class: OP- Observation  Admission Date: 12/7/2022  Attending Physician: Erin Van MD   Primary Care Provider: June Mathew MD         Patient information was obtained from patient and ER records.     Subjective:     Principal Problem:THOMAS (acute kidney injury)    Chief Complaint:   Chief Complaint   Patient presents with    Abnormal Labs     Was referred by MD for elevated creatinine. States has doubled since last time having blood work a few months ago. History of stent to right kidney. States she has been having cold symptoms since last Monday. Reports possibly feeling dehydrated. Was swabbed for covid and flu. Has not received results.         HPI: Patient is a 57 F with PMH Protein C deficiency (hx of LE DVTs, AAA thrombus s/p emergent repair), antiphospholipid syndrome on Xarelto, DMII, HTN, COPD, and SLE (on hydroxycholorquine) who presented to [facility] ED for 1 week of increased SOB, fatigue, and cough. Patient states this past week she has had increased cough with productive green tinged sputum along with feelings of "breathlessness". She also has been more nauseous and states that she "hasn't really eaten or drank much for the past week". She also noted a temperature at home to 100.4F. Patient was recently seen at [facility] clinic where she was treated for COPD exacerbation and given course of prednisone 40mg, azithromycin, and combivent. Patient states her symptoms are reducing while on this regimen. Patient was also found to have an elevated creatinine to 2.9 on her lab work which prompted the PCP to notify the patient to be seen at [facility] ED.    At [facility] ED, repeat labs were drawn which continued to show an increased creatinine (2.6) from patient's baseline along with a stable CBC (no leukocytosis), and unremarkable UA. Patient was then given 1L LR and [facility] FM was consulted for " admission for continued management of THOMAS in the setting of dehydration/limited PO intake and COPD exacerbation.             Past Medical History:   Diagnosis Date    Diabetes mellitus type II     Headache associated with hormonal factors     Hyperlipidemia     Hypertension     Lupus     Protein C deficiency     Tachycardia        Past Surgical History:   Procedure Laterality Date    AORTIC VALVE SURGERY       SECTION      COLONOSCOPY N/A 2017    Procedure: COLONOSCOPY;  Surgeon: Markos Calvert MD;  Location: [facility];  Service: Endoscopy;  Laterality: N/A;    TOE AMPUTATION      TONSILLECTOMY      WRIST FRACTURE SURGERY         Review of patient's allergies indicates:   Allergen Reactions    Niacin preparations Other (See Comments)     Red inflammed eyes    Codeine Rash and Other (See Comments)       No current facility-administered medications on file prior to encounter.     Current Outpatient Medications on File Prior to Encounter   Medication Sig    albuterol (PROVENTIL/VENTOLIN HFA) 90 mcg/actuation inhaler Inhale 2 puffs into the lungs every 6 (six) hours as needed for Wheezing or Shortness of Breath. Rescue    atorvastatin (LIPITOR) 40 MG tablet Take 1 tablet (40 mg total) by mouth once daily.    azithromycin (Z-ROSALIA) 250 MG tablet Take 2 tablets by mouth on day 1; Take 1 tablet by mouth on days 2-5    fexofenadine (ALLEGRA) 60 MG tablet Take 1 tablet (60 mg total) by mouth once daily.    gabapentin (NEURONTIN) 600 MG tablet Take 2 tablets (1,200 mg total) by mouth 3 (three) times daily.    hydrALAZINE (APRESOLINE) 50 MG tablet Take 1 tablet (50 mg total) by mouth 3 (three) times daily.    hydrOXYchloroQUINE (PLAQUENIL) 200 mg tablet Take 1 tablet (200 mg total) by mouth 2 (two) times daily.    ipratropium-albuteroL (COMBIVENT)  mcg/actuation inhaler Inhale 2 puffs into the lungs every 6 (six) hours as needed for Wheezing or Shortness of Breath. Rescue     levocetirizine (XYZAL) 5 MG tablet Take 1 tablet (5 mg total) by mouth daily as needed for Allergies.    losartan (COZAAR) 100 MG tablet Take 1 tablet (100 mg total) by mouth once daily.    metoprolol succinate (TOPROL-XL) 50 MG 24 hr tablet Take 1 tablet (50 mg total) by mouth once daily.    NIFEdipine (PROCARDIA-XL) 60 MG (OSM) 24 hr tablet Take 1 tablet (60 mg total) by mouth once daily.    omeprazole (PRILOSEC) 40 MG capsule Take 1 capsule (40 mg total) by mouth once daily.    ondansetron (ZOFRAN) 4 MG tablet Take 1 tablet (4 mg total) by mouth every 12 (twelve) hours as needed for Nausea.    predniSONE (DELTASONE) 20 MG tablet Take 2 tablets (40 mg total) by mouth once daily. for 5 days    rivaroxaban (XARELTO) 20 mg Tab Take 1 tablet (20 mg total) by mouth daily with dinner or evening meal.    SITagliptin (JANUVIA) 50 MG Tab Take 1 tablet (50 mg total) by mouth once daily.     Family History       Problem Relation (Age of Onset)    Cancer Father    Colon cancer Paternal Aunt    Diabetes Maternal Uncle    Hypertension Mother    Stroke Maternal Grandfather          Tobacco Use    Smoking status: Every Day     Packs/day: 1.00     Years: 35.00     Pack years: 35.00     Types: Cigarettes    Smokeless tobacco: Never   Substance and Sexual Activity    Alcohol use: No    Drug use: No    Sexual activity: Not on file     Review of Systems   Constitutional:  Positive for activity change, appetite change and fatigue. Negative for chills and diaphoresis.   HENT:  Negative for sinus pressure, sinus pain and sore throat.    Eyes:  Negative for photophobia and visual disturbance.   Respiratory:  Positive for cough, chest tightness, shortness of breath and wheezing.    Cardiovascular:  Negative for chest pain, palpitations and leg swelling.   Gastrointestinal:  Positive for nausea. Negative for abdominal pain, constipation, diarrhea and vomiting.   Genitourinary:  Negative for dysuria and flank pain.    Musculoskeletal:  Negative for arthralgias and back pain.   Skin:  Negative for color change and wound.   Neurological:  Negative for dizziness, syncope, light-headedness and headaches.   Psychiatric/Behavioral: Negative.     Objective:     Vital Signs (Most Recent):  Temp: 98.4 °F (36.9 °C) (12/08/22 0224)  Pulse: 85 (12/08/22 0224)  Resp: 18 (12/08/22 0224)  BP: (!) 146/64 (12/08/22 0224)  SpO2: 97 % (12/08/22 0224)   Vital Signs (24h Range):  Temp:  [98.4 °F (36.9 °C)-98.8 °F (37.1 °C)] 98.4 °F (36.9 °C)  Pulse:  [] 85  Resp:  [15-18] 18  SpO2:  [96 %-98 %] 97 %  BP: (119-146)/(53-83) 146/64     Weight: 59.9 kg (132 lb)  Body mass index is 24.14 kg/m².    Physical Exam  Constitutional:       General: She is not in acute distress.     Appearance: Normal appearance. She is not toxic-appearing.   HENT:      Head: Normocephalic and atraumatic.      Right Ear: External ear normal.      Left Ear: External ear normal.      Nose: Nose normal.      Mouth/Throat:      Mouth: Mucous membranes are moist.   Eyes:      Pupils: Pupils are equal, round, and reactive to light.   Cardiovascular:      Rate and Rhythm: Normal rate and regular rhythm.      Pulses: Normal pulses.      Heart sounds: Normal heart sounds.   Pulmonary:      Effort: Pulmonary effort is normal. No respiratory distress.      Breath sounds: Wheezing present.      Comments: Wheezing heard in mid/lower lung bases bilaterally   Abdominal:      General: There is no distension.      Palpations: Abdomen is soft.      Tenderness: There is no abdominal tenderness. There is no guarding or rebound.   Musculoskeletal:         General: No swelling.   Skin:     General: Skin is warm.      Capillary Refill: Capillary refill takes less than 2 seconds.   Neurological:      General: No focal deficit present.      Mental Status: She is alert.   Psychiatric:         Mood and Affect: Mood normal.         CRANIAL NERVES     CN III, IV, VI   Pupils are equal, round, and  reactive to light.     Significant Labs: All pertinent labs within the past 24 hours have been reviewed.  CBC:   Recent Labs   Lab 12/07/22  0840 12/07/22  2106   WBC 4.65 6.49   HGB 11.6* 10.6*   HCT 37.9 33.6*    146*     CMP:   Recent Labs   Lab 12/07/22  0840 12/07/22  2106    142   K 4.5 4.5    111*   CO2 14* 18*   * 112*   BUN 35* 40*   CREATININE 2.9* 2.6*   CALCIUM 9.1 9.0   PROT 7.5 7.1   ALBUMIN 4.0 3.8   BILITOT 0.4 0.3   ALKPHOS 98 96   AST 14 12   ALT 13 11   ANIONGAP 15 13       Significant Imaging: I have reviewed all pertinent imaging results/findings within the past 24 hours.    Assessment/Plan:     * THOMAS (acute kidney injury)  Patient presented with Serum Cr 2.6, with a  baseline Serum Cr of 1.3  Most likely due pre-renal etiology to dehydration and limited PO intake  Initial UA: unremarkable    PLAN:  - S/p 1L LR in ED  - Maintain UOP at 0.5ml/kg/hr  - Promote PO fluid intake  - Hold nephrotoxic medications   - Renally-dose current meds if available  - Bladder scan if suspicion of retention, followed by retroperitoneal US to evaluate for Hydronephrosis.  - Avoid Hypotension  - Strict I/O's  - Monitor Renal Function Carefully  - urine Na / Urea / Creatinine studies pending        COPD exacerbation  Patient with recent COPD exacerbation dx with PCP  Patient noting resolving symptoms  CXR with no acute abnormality  Current O2 Requirement: saturating well on RA    PLAN:  - Duo nebs q4h while awake  - Prednisone 40 mg qDaily x5 days (end date 12/11)  - Monitor Pulse Ox q4h  - Azithromycin 500 mg x 1 dose, then 250 mg qDaily x4 days (end date 12/11)        Current use of long term anticoagulation  Patient with hx of APS and Protein C deficiency on chronic xarelto  Will renally dose xarelto during admission if able      DMII (diabetes mellitus, type 2)  Patient on home januvia  Most recent A1C 5.7  Will start SSI  Goal glucose 140-180    Hyperlipidemia  Continue home  statin      Hypertension  Patient on multiple HTN medications  Per chart hx, patient with renal artery stenosis s/p stent in 2008  Continue home hydralazine, losartan, metoprolol, and nifedipine      Lupus  Continue home hydroxychloroquine        VTE Risk Mitigation (From admission, onward)         Ordered     rivaroxaban tablet 20 mg  With dinner         12/08/22 0024     Reason for No Pharmacological VTE Prophylaxis  Once        Question:  Reasons:  Answer:  Already adequately anticoagulated on oral Anticoagulants    12/08/22 0024     IP VTE HIGH RISK PATIENT  Once         12/08/22 0024     Place sequential compression device  Until discontinued         12/08/22 0024                   Maximilian Montemayor MD PGY-2  Department of Hospital Medicine   [facility] - Emergency Dept

## 2022-12-10 NOTE — DISCHARGE SUMMARY
"[facility]  [facility]  Discharge Summary      Patient Name: Aggie Rasheed  MRN: [id number]  MAGY: [id number]  Patient Class: OP- Observation  Admission Date: 12/7/2022  Hospital Length of Stay: 0 days  Discharge Date and Time: 12/8/2022  4:14 PM  Attending Physician: No att. providers found   Discharging Provider: Matt Rene DO  Primary Care Provider: June Mathew MD    Primary Care Team: Networked reference to record PCT     HPI:   Patient is a 57 F with PMH Protein C deficiency (hx of LE DVTs, AAA thrombus s/p emergent repair), antiphospholipid syndrome on Xarelto, DMII, HTN, COPD, and SLE (on hydroxycholorquine) who presented to [facility] ED for 1 week of increased SOB, fatigue, and cough. Patient states this past week she has had increased cough with productive green tinged sputum along with feelings of "breathlessness". She also has been more nauseous and states that she "hasn't really eaten or drank much for the past week". She also noted a temperature at home to 100.4F. Patient was recently seen at [facility] clinic where she was treated for COPD exacerbation and given course of prednisone 40mg, azithromycin, and combivent. Patient states her symptoms are reducing while on this regimen. Patient was also found to have an elevated creatinine to 2.9 on her lab work which prompted the PCP to notify the patient to be seen at [facility] ED.    At [facility] ED, repeat labs were drawn which continued to show an increased creatinine (2.6) from patient's baseline along with a stable CBC (no leukocytosis), and unremarkable UA. Patient was then given 1L LR and [facility] FM was consulted for admission for continued management of THOMAS in the setting of dehydration/limited PO intake and COPD exacerbation.             * No surgery found *      Hospital Course:   Ms. Rasheed initially presented to the ED with 1 week of increased cough and breathlessness. She had a workup at [facility] clinic and was being treated for " a COPD exacerbation with prednisone and azithromycin. She was also found to have an THOMAS. Her creatinine was elevated to 2.6 from her baseline of ~1.5 upon admission but had continued to improve after fluid resuscitation. She stated that her respiratory symptoms were much improved from the previous days. Afternoon BNP showed Cr 1.7 consistent with baseline. Patient tolerating PO food and liquid intake and requested to be discharged. I stressed the importance of continued fluid intake, smoking cessation, and close fu with her PCP. Patient had been referred to nephrology in the past given history of lupus and worsening kidney function. A repeat referral was placed and explained to the patient the importance of making this appointment. She voiced understanding of the plan.      Physical Exam  Constitutional:       General: She is not in acute distress.     Appearance: Normal appearance.   HENT:      Head: Normocephalic and atraumatic.      Right Ear: External ear normal.      Left Ear: External ear normal.      Nose: Nose normal.   Eyes:      Extraocular Movements: Extraocular movements intact.      Pupils: Pupils are equal, round, and reactive to light.   Cardiovascular:      Rate and Rhythm: Normal rate and regular rhythm.      Pulses: Normal pulses.      Heart sounds: Normal heart sounds.   Pulmonary:      Effort: Pulmonary effort is normal.      Breath sounds: Normal breath sounds. No wheezing.   Abdominal:      General: Bowel sounds are normal.   Musculoskeletal:      Cervical back: Neck supple.   Skin:     Capillary Refill: Capillary refill takes less than 2 seconds.   Neurological:      Mental Status: She is alert and oriented to person, place, and time.   Psychiatric:         Mood and Affect: Mood normal.         Behavior: Behavior normal.       Goals of Care Treatment Preferences:  Code Status: Full Code      Consults:     No new Assessment & Plan notes have been filed under this hospital service since the last  note was generated.  Service: Hospital Medicine    Final Active Diagnoses:    Diagnosis Date Noted POA    Current use of long term anticoagulation [Z79.01] 03/22/2019 Not Applicable    DMII (diabetes mellitus, type 2) [E11.9] 07/02/2015 Yes    Lupus [M32.9]  Yes    Hypertension [I10]  Yes    Hyperlipidemia [E78.5]  Yes      Problems Resolved During this Admission:    Diagnosis Date Noted Date Resolved POA    PRINCIPAL PROBLEM:  THOMAS (acute kidney injury) [N17.9] 12/08/2022 12/08/2022 Yes    COPD exacerbation [J44.1] 12/08/2022 12/08/2022 Yes       Discharged Condition: stable    Disposition: Home or Self Care    Follow Up:   Follow-up Information     June Mathew MD Follow up in 1 week(s).    Specialty: Family Medicine  Contact information:  [address]  [place] [address]  [phone]             Liz Gerard MD Follow up.    Specialty: Nephrology  Why: Nephrology Appointment Requested. You will be contacted with follow-up information.  Contact information:  [address]  [address]  [facility]  [place] [address] [phone]                       Patient Instructions:      Ambulatory referral/consult to Nephrology   Standing Status: Future   Referral Priority: Routine Referral Type: Consultation   Referral Reason: Specialty Services Required   Requested Specialty: Nephrology   Number of Visits Requested: 1       Significant Diagnostic Studies: Labs: All labs within the past 24 hours have been reviewed    Pending Diagnostic Studies:     None         Medications:  Reconciled Home Medications:      Medication List      CONTINUE taking these medications    albuterol 90 mcg/actuation inhaler  Commonly known as: PROVENTIL/VENTOLIN HFA  Inhale 2 puffs into the lungs every 6 (six) hours as needed for Wheezing or Shortness of Breath. Rescue     atorvastatin 40 MG tablet  Commonly known as: LIPITOR  Take 1 tablet (40 mg total) by mouth once daily.     azithromycin 250 MG  tablet  Commonly known as: Z-ROSALIA  Take 2 tablets by mouth on day 1; Take 1 tablet by mouth on days 2-5     fexofenadine 60 MG tablet  Commonly known as: ALLEGRA  Take 1 tablet (60 mg total) by mouth once daily.     gabapentin 600 MG tablet  Commonly known as: NEURONTIN  Take 2 tablets (1,200 mg total) by mouth 3 (three) times daily.     hydrALAZINE 50 MG tablet  Commonly known as: APRESOLINE  Take 1 tablet (50 mg total) by mouth 3 (three) times daily.     hydrOXYchloroQUINE 200 mg tablet  Commonly known as: PLAQUENIL  Take 1 tablet (200 mg total) by mouth 2 (two) times daily.     ipratropium-albuteroL  mcg/actuation inhaler  Commonly known as: CombiVENT  Inhale 2 puffs into the lungs every 6 (six) hours as needed for Wheezing or Shortness of Breath. Rescue     levocetirizine 5 MG tablet  Commonly known as: XYZAL  Take 1 tablet (5 mg total) by mouth daily as needed for Allergies.     losartan 100 MG tablet  Commonly known as: COZAAR  Take 1 tablet (100 mg total) by mouth once daily.     metoprolol succinate 50 MG 24 hr tablet  Commonly known as: TOPROL-XL  Take 1 tablet (50 mg total) by mouth once daily.     NIFEdipine 60 MG (OSM) 24 hr tablet  Commonly known as: PROCARDIA-XL  Take 1 tablet (60 mg total) by mouth once daily.     omeprazole 40 MG capsule  Commonly known as: PRILOSEC  Take 1 capsule (40 mg total) by mouth once daily.     ondansetron 4 MG tablet  Commonly known as: ZOFRAN  Take 1 tablet (4 mg total) by mouth every 12 (twelve) hours as needed for Nausea.     predniSONE 20 MG tablet  Commonly known as: DELTASONE  Take 2 tablets (40 mg total) by mouth once daily. for 5 days     rivaroxaban 20 mg Tab  Commonly known as: XARELTO  Take 1 tablet (20 mg total) by mouth daily with dinner or evening meal.     SITagliptin phosphate 50 MG Tab  Commonly known as: JANUVIA  Take 1 tablet (50 mg total) by mouth once daily.            Indwelling Lines/Drains at time of discharge:   Lines/Drains/Airways      None                 Time spent on the discharge of patient: 35 minutes         Matt Rene DO  Department of Hospital Medicine  [place] - Emergency Dept

## 2022-12-16 ENCOUNTER — OFFICE VISIT (OUTPATIENT)
Dept: FAMILY MEDICINE | Facility: HOSPITAL | Age: 57
End: 2022-12-16
Payer: MEDICAID

## 2022-12-16 VITALS
HEIGHT: 62 IN | BODY MASS INDEX: 25.64 KG/M2 | DIASTOLIC BLOOD PRESSURE: 80 MMHG | HEART RATE: 88 BPM | WEIGHT: 139.31 LBS | SYSTOLIC BLOOD PRESSURE: 146 MMHG

## 2022-12-16 DIAGNOSIS — J44.9 CHRONIC OBSTRUCTIVE PULMONARY DISEASE, UNSPECIFIED COPD TYPE: ICD-10-CM

## 2022-12-16 DIAGNOSIS — N18.30 STAGE 3 CHRONIC KIDNEY DISEASE, UNSPECIFIED WHETHER STAGE 3A OR 3B CKD: Primary | ICD-10-CM

## 2022-12-16 PROCEDURE — 99214 OFFICE O/P EST MOD 30 MIN: CPT | Performed by: STUDENT IN AN ORGANIZED HEALTH CARE EDUCATION/TRAINING PROGRAM

## 2022-12-16 RX ORDER — TIOTROPIUM BROMIDE 18 UG/1
18 CAPSULE ORAL; RESPIRATORY (INHALATION) DAILY
Qty: 30 CAPSULE | Refills: 2 | Status: SHIPPED | OUTPATIENT
Start: 2022-12-16 | End: 2023-03-29 | Stop reason: SDUPTHER

## 2022-12-16 NOTE — PROGRESS NOTES
Progress Note  [facility]    Subjective:      Patient ID: Aggie Rasheed is a 57 y.o. female    Chief Complaint: Follow-up (HOSPITAL.)    Aggie Rasheed is a 57-year-old female with a PMHx of tobacco abuse, HTN, COPD, lupus, CKD stage 3 who is presenting for hospital follow-up.  Patient with recent hospitalization for COPD exacerbation and acute kidney injury. Discharged from [facility] on 12/08/2022. Endorses she is feeling much better since hospital discharge and denies any worsening shortness of breath or decreased urine output.    Regarding her acute kidney injury, patient endorses she has been drinking more water and her urine has been clear. She endorses she needs to establish care with Nephrology. Patient does have a history of lupus.  Regarding her COPD, endorses no worsening of shortness of breath, sputum production, or cough. Endorses chronic shortness of breath at baseline with walking long distances. Patient uses an ipratropium albuterol inhaler as needed for shortness of breath. Patient with previous PFTs showing no obstruction but still endorses she is still smoking and does have a prolonged smoking history.       Health Maintenance         Date Due Completion Date    Cervical Cancer Screening Never done ---    COVID-19 Vaccine (1) Never done ---    Shingles Vaccine (1 of 2) Never done ---    Eye Exam 07/02/2016 7/2/2015    Foot Exam 08/25/2018 8/25/2017    Influenza Vaccine (1) 09/01/2022 10/9/2019    Diabetes Urine Screening 03/21/2023 3/21/2022    LDCT Lung Screen 04/20/2023 4/20/2022    Hemoglobin A1c 05/07/2023 11/7/2022    Override on 4/2/2015: Done    Mammogram 06/14/2023 6/14/2022    Lipid Panel 11/07/2023 11/7/2022    Override on 4/2/2015: Done    Colorectal Cancer Screening 06/19/2027 6/19/2017    Pneumococcal Vaccines (Age 0-64) (3 - PPSV23 if available, else PCV20) 08/13/2028 7/9/2018    TETANUS VACCINE 08/14/2029 8/14/2019            Review of Systems   Constitutional:   Negative for fatigue.   HENT:  Negative for congestion and sore throat.    Eyes:  Negative for visual disturbance.   Respiratory:  Positive for shortness of breath (With exertion). Negative for cough, chest tightness and wheezing.    Cardiovascular:  Negative for chest pain.   Gastrointestinal:  Negative for abdominal pain, blood in stool, constipation, diarrhea, nausea and vomiting.   Genitourinary:  Negative for difficulty urinating, dysuria and frequency.   Musculoskeletal:  Negative for arthralgias and myalgias.   Skin:  Negative for rash.   Allergic/Immunologic: Negative for environmental allergies and food allergies.   Neurological:  Negative for dizziness, weakness, numbness and headaches.   Psychiatric/Behavioral:  Negative for dysphoric mood and sleep disturbance. The patient is not nervous/anxious.       Objective:      Vitals:    12/16/22 1013   BP: (!) 146/80   Pulse: 88     BP Readings from Last 3 Encounters:   12/16/22 (!) 146/80   12/08/22 (!) 149/116   12/06/22 (!) 110/57       Physical Exam  Constitutional:       General: She is not in acute distress.     Appearance: She is not ill-appearing.   Cardiovascular:      Rate and Rhythm: Normal rate and regular rhythm.      Heart sounds: No murmur heard.    No friction rub. No gallop.   Pulmonary:      Breath sounds: No wheezing, rhonchi or rales.      Comments: Prolonged expiratory phase  Diminished breath sounds with poor air movement  No wheezing  Musculoskeletal:         General: Normal range of motion.      Right lower leg: No edema.      Left lower leg: No edema.   Neurological:      General: No focal deficit present.      Mental Status: She is alert and oriented to person, place, and time.   Psychiatric:         Mood and Affect: Mood normal.         Behavior: Behavior normal.       Assessment:     1. Stage 3 chronic kidney disease, unspecified whether stage 3a or 3b CKD    2. Chronic obstructive pulmonary disease, unspecified COPD type          Plan:     Stage 3 chronic kidney disease, unspecified whether stage 3a or 3b CKD  -     Ambulatory referral/consult to Nephrology; Future; Expected date: 12/23/2022  -     Comprehensive Metabolic Panel; Future; Expected date: 04/15/2023    Chronic obstructive pulmonary disease, unspecified COPD type  -     Complete PFT w/ bronchodilator; Future  -     tiotropium (SPIRIVA) 18 mcg inhalation capsule; Inhale 1 capsule (18 mcg total) into the lungs once daily. Controller  Dispense: 30 capsule; Refill: 2    For COPD, will start Spiriva for maintenance therapy. Will repeat PFTs.  For THOMAS on CKD, will repeat a CMP today to evaluate renal function. Patient needs to follow-up with nephrology to establish care.    Follow-up in 3 months.    Frank Thrasher DO  [facility], PGY-2  12/16/2022      This note was partially created using [employer] Voice Recognition software. Typographical and content errors may occur with this process. While efforts are made to detect and correct such errors, in some cases errors will persist. For this reason, wording in this document should be considered in the proper context and not strictly verbatim

## 2023-01-12 ENCOUNTER — HOSPITAL ENCOUNTER (OUTPATIENT)
Dept: PULMONOLOGY | Facility: HOSPITAL | Age: 58
Discharge: HOME | End: 2023-01-12
Attending: STUDENT IN AN ORGANIZED HEALTH CARE EDUCATION/TRAINING PROGRAM
Payer: MEDICAID

## 2023-01-12 DIAGNOSIS — J44.9 CHRONIC OBSTRUCTIVE PULMONARY DISEASE, UNSPECIFIED COPD TYPE: ICD-10-CM

## 2023-01-12 PROCEDURE — 94729 DIFFUSING CAPACITY: CPT

## 2023-01-12 PROCEDURE — 94010 BREATHING CAPACITY TEST: CPT

## 2023-01-14 LAB
BRPFT: ABNORMAL
DLCO ADJ PRE: 11.48 ML/(MIN*MMHG) (ref 15.8–27.27)
DLCO SINGLE BREATH LLN: 15.8
DLCO SINGLE BREATH PRE REF: 53.3 %
DLCO SINGLE BREATH REF: 21.53
DLCOC SBVA LLN: 3.09
DLCOC SBVA PRE REF: 66.2 %
DLCOC SBVA REF: 4.71
DLCOC SINGLE BREATH LLN: 15.8
DLCOC SINGLE BREATH PRE REF: 53.3 %
DLCOC SINGLE BREATH REF: 21.53
DLCOVA LLN: 3.09
DLCOVA PRE REF: 66.2 %
DLCOVA PRE: 3.12 ML/(MIN*MMHG*L) (ref 3.09–6.33)
DLCOVA REF: 4.71
DLVAADJ PRE: 3.12 ML/(MIN*MMHG*L) (ref 3.09–6.33)
FEF 25 75 LLN: 1.13
FEF 25 75 PRE REF: 68.1 %
FEF 25 75 REF: 2.19
FEV1 FVC LLN: 68
FEV1 FVC PRE REF: 95.5 %
FEV1 FVC REF: 80
FEV1 LLN: 1.77
FEV1 PRE REF: 74.5 %
FEV1 REF: 2.34
FVC LLN: 2.24
FVC PRE REF: 77.6 %
FVC REF: 2.94
IVC PRE: 2.21 L (ref 2.24–3.65)
IVC SINGLE BREATH LLN: 2.24
IVC SINGLE BREATH PRE REF: 74.9 %
IVC SINGLE BREATH REF: 2.94
PEF LLN: 4.42
PEF PRE REF: 63.3 %
PEF REF: 6.02
PRE DLCO: 11.48 ML/(MIN*MMHG) (ref 15.8–27.27)
PRE FEF 25 75: 1.49 L/S (ref 1.13–3.25)
PRE FET 100: 6.93 SEC
PRE FEV1 FVC: 76.14 % (ref 67.71–91.7)
PRE FEV1: 1.74 L (ref 1.77–2.9)
PRE FVC: 2.28 L (ref 2.24–3.65)
PRE PEF: 3.81 L/S (ref 4.42–7.61)
VA PRE: 3.68 L (ref 4.42–4.42)
VA SINGLE BREATH LLN: 4.42
VA SINGLE BREATH PRE REF: 83.3 %
VA SINGLE BREATH REF: 4.42

## 2023-01-19 NOTE — PROGRESS NOTES
I assume primary medical responsibility for this patient. I have reviewed the history, physical, and assessement & treatment plan with the resident and agree that the care is reasonable and necessary. This service has been performed by a resident without the presence of a teaching physician under the primary care exception. If necessary, an addendum of additional findings or evaluation beyond the resident documentation will be noted below.     Lashon Can MD

## 2023-02-14 DIAGNOSIS — I10 ESSENTIAL HYPERTENSION: ICD-10-CM

## 2023-02-14 RX ORDER — NIFEDIPINE 60 MG/1
60 TABLET, EXTENDED RELEASE ORAL DAILY
Qty: 90 TABLET | Refills: 1 | Status: CANCELLED | OUTPATIENT
Start: 2023-02-14

## 2023-02-14 RX ORDER — NIFEDIPINE 60 MG/1
60 TABLET, EXTENDED RELEASE ORAL DAILY
Qty: 90 TABLET | Refills: 3 | Status: SHIPPED | OUTPATIENT
Start: 2023-02-14 | End: 2023-11-28 | Stop reason: SDUPTHER

## 2023-02-14 NOTE — TELEPHONE ENCOUNTER
----- Message from Liliane Coronado MA sent at 2/14/2023 11:36 AM CST -----  Contact: Patient [phone]  Patient requests NIFEdipine (PROCARDIA-XL) 60 MG (OSM) 24 hr tablet be sent to [facility]; patient is out of meds. Thanks.

## 2023-03-29 ENCOUNTER — OFFICE VISIT (OUTPATIENT)
Dept: FAMILY MEDICINE | Facility: HOSPITAL | Age: 58
End: 2023-03-29
Payer: MEDICAID

## 2023-03-29 VITALS
DIASTOLIC BLOOD PRESSURE: 70 MMHG | HEART RATE: 84 BPM | HEIGHT: 62 IN | WEIGHT: 136.25 LBS | SYSTOLIC BLOOD PRESSURE: 146 MMHG | BODY MASS INDEX: 25.07 KG/M2

## 2023-03-29 DIAGNOSIS — F32.A ANXIETY AND DEPRESSION: Primary | ICD-10-CM

## 2023-03-29 DIAGNOSIS — F41.9 ANXIETY AND DEPRESSION: Primary | ICD-10-CM

## 2023-03-29 DIAGNOSIS — N18.30 STAGE 3 CHRONIC KIDNEY DISEASE, UNSPECIFIED WHETHER STAGE 3A OR 3B CKD: ICD-10-CM

## 2023-03-29 DIAGNOSIS — J44.9 CHRONIC OBSTRUCTIVE PULMONARY DISEASE, UNSPECIFIED COPD TYPE: ICD-10-CM

## 2023-03-29 DIAGNOSIS — G62.9 PERIPHERAL POLYNEUROPATHY: ICD-10-CM

## 2023-03-29 DIAGNOSIS — I10 ESSENTIAL HYPERTENSION: ICD-10-CM

## 2023-03-29 PROCEDURE — 99214 OFFICE O/P EST MOD 30 MIN: CPT | Performed by: STUDENT IN AN ORGANIZED HEALTH CARE EDUCATION/TRAINING PROGRAM

## 2023-03-29 RX ORDER — GABAPENTIN 600 MG/1
1200 TABLET ORAL 3 TIMES DAILY
Qty: 180 TABLET | Refills: 0 | Status: SHIPPED | OUTPATIENT
Start: 2023-03-29 | End: 2023-05-16 | Stop reason: SDUPTHER

## 2023-03-29 RX ORDER — LOSARTAN POTASSIUM 100 MG/1
100 TABLET ORAL DAILY
Qty: 90 TABLET | Refills: 1 | Status: SHIPPED | OUTPATIENT
Start: 2023-03-29 | End: 2023-10-03 | Stop reason: SDUPTHER

## 2023-03-29 RX ORDER — ESCITALOPRAM OXALATE 10 MG/1
10 TABLET ORAL DAILY
Qty: 30 TABLET | Refills: 11 | Status: SHIPPED | OUTPATIENT
Start: 2023-03-29 | End: 2024-03-28

## 2023-03-29 RX ORDER — TIOTROPIUM BROMIDE 18 UG/1
18 CAPSULE ORAL; RESPIRATORY (INHALATION) DAILY
Qty: 30 CAPSULE | Refills: 2 | Status: SHIPPED | OUTPATIENT
Start: 2023-03-29 | End: 2023-07-31 | Stop reason: SDUPTHER

## 2023-03-29 NOTE — PROGRESS NOTES
Subjective:      Patient ID: Aggie Rasheed is a 57 y.o. female.    Chief Complaint: Anxiety and Depression    HPI 58 yo female     #Anxiety and Depression  4-5 months ago friend julio marques cancer, has been helping care for her and feeling these symptoms since then  Additionally stress of responsibility of caring for grandchildren  Symptoms: increased fatigue, difficulty concentrating, worry, difficulty sleeping, loss of pleasure in activities   Had previously been on Paxil during divorce, unclear if it was helping or how long she was taking  Denies SI/HI/AVH    Past Medical History:   Diagnosis Date    Diabetes mellitus type II     Headache associated with hormonal factors     Hyperlipidemia     Hypertension     Lupus     Protein C deficiency     Tachycardia      Review of Systems     10 point review of systems was conducted and only the pertinent positives and pertinent negatives are noted above in the HPI section.    Objective:     Vitals:    03/29/23 1513   BP: (!) 146/70   Pulse: 84     Physical Exam  Vitals reviewed.   Constitutional:       General: She is not in acute distress.     Appearance: Normal appearance. She is normal weight. She is not ill-appearing.   Eyes:      Conjunctiva/sclera: Conjunctivae normal.      Pupils: Pupils are equal, round, and reactive to light.   Cardiovascular:      Rate and Rhythm: Normal rate and regular rhythm.      Pulses: Normal pulses.      Heart sounds: Normal heart sounds.   Pulmonary:      Effort: Pulmonary effort is normal. No respiratory distress.      Breath sounds: Normal breath sounds.   Abdominal:      General: There is no distension.      Palpations: Abdomen is soft.      Tenderness: There is no abdominal tenderness.   Neurological:      General: No focal deficit present.      Mental Status: She is alert and oriented to person, place, and time.   Psychiatric:         Mood and Affect: Mood normal.         Behavior: Behavior normal.     Assessment:      1. Anxiety and  depression    2. Peripheral polyneuropathy    3. Essential hypertension    4. Chronic obstructive pulmonary disease, unspecified COPD type    5. Stage 3 chronic kidney disease, unspecified whether stage 3a or 3b CKD      Plan:     Anxiety and depression  Provided handout for therapist taking Medicaid in the area   GAD7 score 15  PHQ9 score 16  -     EScitalopram oxalate (LEXAPRO) 10 MG tablet; Take 1 tablet (10 mg total) by mouth once daily.  Dispense: 30 tablet; Refill: 11    Peripheral polyneuropathy  -     gabapentin (NEURONTIN) 600 MG tablet; Take 2 tablets (1,200 mg total) by mouth 3 (three) times daily.  Dispense: 180 tablet; Refill: 0    Essential hypertension  -     losartan (COZAAR) 100 MG tablet; Take 1 tablet (100 mg total) by mouth once daily.  Dispense: 90 tablet; Refill: 1    Chronic obstructive pulmonary disease, unspecified COPD type  -     tiotropium (SPIRIVA) 18 mcg inhalation capsule; Inhale 1 capsule (18 mcg total) into the lungs once daily. Controller  Dispense: 30 capsule; Refill: 2      Follow up in about 6 weeks (around 5/10/2023), or if symptoms worsen or fail to improve, for follow up.  Labs at follow up    June Mathew MD, [facility] PGY-3  03/29/2023

## 2023-05-16 DIAGNOSIS — G62.9 PERIPHERAL POLYNEUROPATHY: ICD-10-CM

## 2023-05-16 RX ORDER — GABAPENTIN 600 MG/1
1200 TABLET ORAL 3 TIMES DAILY
Qty: 540 TABLET | Refills: 3 | Status: SHIPPED | OUTPATIENT
Start: 2023-05-16 | End: 2023-10-06 | Stop reason: SDUPTHER

## 2023-05-16 RX ORDER — GABAPENTIN 600 MG/1
1200 TABLET ORAL 3 TIMES DAILY
Qty: 180 TABLET | Refills: 0 | Status: CANCELLED | OUTPATIENT
Start: 2023-05-16 | End: 2024-05-15

## 2023-05-16 NOTE — TELEPHONE ENCOUNTER
----- Message from Dayana Yoder sent at 5/16/2023  9:56 AM CDT -----  Regarding: refill on medicine  Pt called in for a refill on medicine...gabapentin (NEURONTIN) 600 MG tablet. Call back # [phone]

## 2023-05-29 ENCOUNTER — OFFICE VISIT (OUTPATIENT)
Dept: FAMILY MEDICINE | Facility: HOSPITAL | Age: 58
End: 2023-05-29
Payer: MEDICAID

## 2023-05-29 VITALS
WEIGHT: 136 LBS | HEART RATE: 72 BPM | DIASTOLIC BLOOD PRESSURE: 65 MMHG | SYSTOLIC BLOOD PRESSURE: 125 MMHG | BODY MASS INDEX: 25.03 KG/M2 | HEIGHT: 62 IN

## 2023-05-29 DIAGNOSIS — T78.40XD ALLERGY, SUBSEQUENT ENCOUNTER: ICD-10-CM

## 2023-05-29 DIAGNOSIS — N18.30 STAGE 3 CHRONIC KIDNEY DISEASE, UNSPECIFIED WHETHER STAGE 3A OR 3B CKD: ICD-10-CM

## 2023-05-29 DIAGNOSIS — F41.9 ANXIETY AND DEPRESSION: Primary | ICD-10-CM

## 2023-05-29 DIAGNOSIS — Z71.6 ENCOUNTER FOR SMOKING CESSATION COUNSELING: ICD-10-CM

## 2023-05-29 DIAGNOSIS — R53.83 FATIGUE, UNSPECIFIED TYPE: ICD-10-CM

## 2023-05-29 DIAGNOSIS — E11.9 TYPE 2 DIABETES MELLITUS WITHOUT COMPLICATION, WITHOUT LONG-TERM CURRENT USE OF INSULIN: ICD-10-CM

## 2023-05-29 DIAGNOSIS — I10 ESSENTIAL HYPERTENSION: ICD-10-CM

## 2023-05-29 DIAGNOSIS — Z12.12 ENCOUNTER FOR SCREENING FOR COLORECTAL MALIGNANT NEOPLASM: ICD-10-CM

## 2023-05-29 DIAGNOSIS — Z12.11 ENCOUNTER FOR SCREENING FOR COLORECTAL MALIGNANT NEOPLASM: ICD-10-CM

## 2023-05-29 DIAGNOSIS — Z12.2 ENCOUNTER FOR SCREENING FOR LUNG CANCER: ICD-10-CM

## 2023-05-29 DIAGNOSIS — F32.A ANXIETY AND DEPRESSION: Primary | ICD-10-CM

## 2023-05-29 DIAGNOSIS — Z12.31 ENCOUNTER FOR SCREENING MAMMOGRAM FOR MALIGNANT NEOPLASM OF BREAST: ICD-10-CM

## 2023-05-29 PROCEDURE — 99215 OFFICE O/P EST HI 40 MIN: CPT | Performed by: STUDENT IN AN ORGANIZED HEALTH CARE EDUCATION/TRAINING PROGRAM

## 2023-05-29 RX ORDER — LEVOCETIRIZINE DIHYDROCHLORIDE 5 MG/1
5 TABLET, FILM COATED ORAL DAILY PRN
Qty: 30 TABLET | Refills: 5 | Status: SHIPPED | OUTPATIENT
Start: 2023-05-29 | End: 2024-05-28

## 2023-05-29 RX ORDER — TRAZODONE HYDROCHLORIDE 50 MG/1
TABLET ORAL
COMMUNITY
Start: 2023-02-08

## 2023-05-29 NOTE — PROGRESS NOTES
I assume primary medical responsibility for this patient. I have reviewed the history, physical, and assessment & treatment plan with the resident and agree that the care is reasonable and necessary. This service has been performed by a resident without the presence of a teaching physician under the primary care exception. If necessary, an addendum of additional findings or evaluation beyond the resident documentation will be noted below.        Valentin Lares Jr., DO    [facility]

## 2023-05-29 NOTE — PROGRESS NOTES
Subjective:      Patient ID: Aggie Rasheed is a 57 y.o. female.    Chief Complaint: Annual Exam    HPI 56 yo f multiple complaints at annual exam    #Anxiety and Depression  Discontinued Lexapro after 3 weeks 2/2 side effects including 2-3 days of vertigo   Did not follow up on therapy 2/2 time constraints  Would like to try persuing therapy and hold of on restarting any medication at this time  Denies SI/HI/AVH    #HTN  Adherent    #Tobacco use  Still smoking ppd  Reports coughing in the morning, improves during the day  Contemplation phase     #Allergies  Worse now in the spring time, has been out of meds for a few days and feels itching and post nasal drip  Does not want to try Flonase     #Fatigue and difficulty sleeping  Stop Bang score 4, intermediate risk for HOLLY (snoring, daytime fatigue, waking up sob)    #SLE  Had been seeing rheum, but has been a while   Hydroxychloroquin   Will schedule follow up    #Routine labs and health care maintenance     Last PAP 8/15/2016 ?, due, patient to schedule  Last Mammo 6/14/2022, will order  Last Colonoscopy 8/13/2013, due 2023 , will order   LDCT due, will order  Eye exam, due, patient to schedule with ophtho   Foot exam, due, patient to schedule with podiatry    Recommend Shingles vaccination  Past Medical History:   Diagnosis Date    Diabetes mellitus type II     Headache associated with hormonal factors     Hyperlipidemia     Hypertension     Lupus     Protein C deficiency     Tachycardia      Review of Systems     10 point review of systems was conducted and only the pertinent positives and pertinent negatives are noted above in the HPI section.    Objective:     Vitals:    05/29/23 0845   BP: 125/65   Pulse: 72     Physical Exam  Vitals reviewed.   Constitutional:       General: She is not in acute distress.     Appearance: Normal appearance. She is normal weight. She is not ill-appearing.   Eyes:      Conjunctiva/sclera: Conjunctivae normal.      Pupils: Pupils  are equal, round, and reactive to light.   Cardiovascular:      Rate and Rhythm: Normal rate and regular rhythm.      Pulses: Normal pulses.      Heart sounds: Normal heart sounds. No murmur heard.  Pulmonary:      Effort: Pulmonary effort is normal. No respiratory distress.      Breath sounds: Normal breath sounds.   Abdominal:      General: There is no distension.   Musculoskeletal:         General: Normal range of motion.      Right lower leg: No edema.      Left lower leg: No edema.   Neurological:      General: No focal deficit present.      Mental Status: She is alert and oriented to person, place, and time.   Psychiatric:         Mood and Affect: Mood normal.         Behavior: Behavior normal.     Assessment:      1. Anxiety and depression    2. Essential hypertension    3. Allergy, subsequent encounter    4. Type 2 diabetes mellitus without complication, without long-term current use of insulin    5. Stage 3 chronic kidney disease, unspecified whether stage 3a or 3b CKD    6. Encounter for screening mammogram for malignant neoplasm of breast    7. Encounter for smoking cessation counseling    8. Encounter for screening for lung cancer    9. Encounter for screening for colorectal malignant neoplasm    10. Fatigue, unspecified type      Plan:     Anxiety and depression  Situational. Encouraged therapy, return as needed to restart medication  PHQ9 score 15  GAD7 score 13  -     T4, Free; Future; Expected date: 05/29/2023  -     TSH; Future; Expected date: 05/29/2023    Essential hypertension   Stable, within goal. Continue current management    Allergy, subsequent encounter  -     levocetirizine (XYZAL) 5 MG tablet; Take 1 tablet (5 mg total) by mouth daily as needed for Allergies.  Dispense: 30 tablet; Refill: 5    Type 2 diabetes mellitus without complication, without long-term current use of insulin  -     CBC Auto Differential; Future; Expected date: 05/29/2023  -     Comprehensive Metabolic Panel; Future;  Expected date: 05/29/2023  -     Hemoglobin A1C; Future; Expected date: 05/29/2023  -     Lipid Panel; Future; Expected date: 05/29/2023  -     T4, Free; Future; Expected date: 05/29/2023  -     Microalbumin/Creatinine Ratio, Urine; Future; Expected date: 05/29/2023  -     TSH; Future; Expected date: 05/29/2023    Stage 3 chronic kidney disease, unspecified whether stage 3a or 3b CKD  -     Comprehensive Metabolic Panel; Future; Expected date: 05/29/2023    Encounter for screening mammogram for malignant neoplasm of breast  -     Mammo Digital Screening Bilat w/ Gerson; Future; Expected date: 05/29/2023    Encounter for smoking cessation counseling  -     CT Chest Lung Screening Low Dose; Future; Expected date: 05/29/2023    Encounter for screening for lung cancer  -     CT Chest Lung Screening Low Dose; Future; Expected date: 05/29/2023    Encounter for screening for colorectal malignant neoplasm  -     Case Request Endoscopy: COLONOSCOPY    Fatigue, unspecified type  Stop Bang score 4  -     Ambulatory referral/consult to Sleep Disorders; Future; Expected date: 06/05/2023      Follow up in about 6 months (around 11/29/2023), or if symptoms worsen or fail to improve, for follow up.    June Mathew MD, MSPH  [facility] PGY-3  05/29/2023

## 2023-05-30 ENCOUNTER — TELEPHONE (OUTPATIENT)
Dept: FAMILY MEDICINE | Facility: HOSPITAL | Age: 58
End: 2023-05-30
Payer: MEDICAID

## 2023-05-30 DIAGNOSIS — D50.9 MICROCYTIC ANEMIA: Primary | ICD-10-CM

## 2023-05-30 NOTE — TELEPHONE ENCOUNTER
Phone call     HPI: 58 yo female follow up from recent lab work. H/o hypertension, type 2 diabetes mellitus, CKD3, lupus.      Assessment and plan:      Essential Hypertension   Controlled on current regimen, 125/65 at last visit    Continue Losartan 100, Metoprolol 50, Nifedipine 60    Diabetes Mellitus   Controlled on current regimen, A1c 5.6   Continue Januvia 50, consider switching to SGLT2i (Jardiance)      CKD3a   S/p renal artery stenosis s/p stent placement 2008   Hospitalization Dec 2022 2/2 elevated Cr 2.6 and GFR 21  No indication of following up with Nephrology referral per chart review   Creatinine now back to baseline 1.3, GFR 47    Micro albuminemia  Microalbumin/Cr Ratio newly elevated 112 (microalb 382)   Repeat annually     Microcytic anemia   Hgb 9.6, MCV 81   Iron and TIBC, ferritin ordered   Postmenopausal, endoscopy recommended    Monthly cbc with iron supplementation if indicated     Lupus   Stable on hydroxychloroquine 200 bid      Call to review next lab results, supplement as indicated. Switch to Jardiance.   Discuss endoscopy.     June Mathew MD, [facility] PGY-3  05/30/2023

## 2023-06-01 ENCOUNTER — HOSPITAL ENCOUNTER (OUTPATIENT)
Dept: RADIOLOGY | Facility: HOSPITAL | Age: 58
Discharge: HOME | End: 2023-06-01
Attending: STUDENT IN AN ORGANIZED HEALTH CARE EDUCATION/TRAINING PROGRAM
Payer: MEDICAID

## 2023-06-01 DIAGNOSIS — Z12.31 ENCOUNTER FOR SCREENING MAMMOGRAM FOR MALIGNANT NEOPLASM OF BREAST: ICD-10-CM

## 2023-06-01 PROCEDURE — 77067 SCR MAMMO BI INCL CAD: CPT | Mod: TC

## 2023-06-01 PROCEDURE — 77067 SCR MAMMO BI INCL CAD: CPT | Mod: 26,,, | Performed by: RADIOLOGY

## 2023-06-01 PROCEDURE — 77067 MAMMO DIGITAL SCREENING BILAT WITH TOMO: ICD-10-PCS | Mod: 26,,, | Performed by: RADIOLOGY

## 2023-06-01 PROCEDURE — 77063 MAMMO DIGITAL SCREENING BILAT WITH TOMO: ICD-10-PCS | Mod: 26,,, | Performed by: RADIOLOGY

## 2023-06-01 PROCEDURE — 77063 BREAST TOMOSYNTHESIS BI: CPT | Mod: 26,,, | Performed by: RADIOLOGY

## 2023-06-05 ENCOUNTER — PATIENT MESSAGE (OUTPATIENT)
Dept: FAMILY MEDICINE | Facility: HOSPITAL | Age: 58
End: 2023-06-05
Payer: MEDICAID

## 2023-06-05 ENCOUNTER — TELEPHONE (OUTPATIENT)
Dept: FAMILY MEDICINE | Facility: HOSPITAL | Age: 58
End: 2023-06-05
Payer: MEDICAID

## 2023-06-05 DIAGNOSIS — D50.9 IRON DEFICIENCY ANEMIA, UNSPECIFIED IRON DEFICIENCY ANEMIA TYPE: Primary | ICD-10-CM

## 2023-06-05 RX ORDER — FERROUS SULFATE 325(65) MG
325 TABLET ORAL
Qty: 90 TABLET | Refills: 0 | Status: SHIPPED | OUTPATIENT
Start: 2023-06-05 | End: 2023-09-03

## 2023-06-05 NOTE — TELEPHONE ENCOUNTER
Tried to call, no answer. Voicemail box not set up.     Iron deficiency anemia    Supplemental iron   Investigate cause in postmenopausal female   Colonoscopy ordered at last visit, follow up on scheduling    Repeat CBC in one month        June Mathew MD, [facility] PGY-3  06/05/2023

## 2023-06-19 ENCOUNTER — HOSPITAL ENCOUNTER (OUTPATIENT)
Dept: RADIOLOGY | Facility: HOSPITAL | Age: 58
Discharge: HOME | End: 2023-06-19
Attending: STUDENT IN AN ORGANIZED HEALTH CARE EDUCATION/TRAINING PROGRAM
Payer: MEDICAID

## 2023-06-19 ENCOUNTER — HOSPITAL ENCOUNTER (OUTPATIENT)
Dept: RADIOLOGY | Facility: HOSPITAL | Age: 58
Discharge: HOME | End: 2023-06-19
Attending: SURGERY
Payer: MEDICAID

## 2023-06-19 DIAGNOSIS — Z12.2 ENCOUNTER FOR SCREENING FOR LUNG CANCER: ICD-10-CM

## 2023-06-19 DIAGNOSIS — I65.23 CAROTID STENOSIS, ASYMPTOMATIC, BILATERAL: ICD-10-CM

## 2023-06-19 DIAGNOSIS — Z71.6 ENCOUNTER FOR SMOKING CESSATION COUNSELING: ICD-10-CM

## 2023-06-19 DIAGNOSIS — Z86.73 H/O: STROKE: ICD-10-CM

## 2023-06-19 DIAGNOSIS — E04.2 MULTIPLE THYROID NODULES: ICD-10-CM

## 2023-06-19 DIAGNOSIS — J44.9 CHRONIC OBSTRUCTIVE PULMONARY DISEASE, UNSPECIFIED COPD TYPE: ICD-10-CM

## 2023-06-19 DIAGNOSIS — E11.9 TYPE 2 DIABETES MELLITUS WITHOUT COMPLICATION, UNSPECIFIED WHETHER LONG TERM INSULIN USE: ICD-10-CM

## 2023-06-19 DIAGNOSIS — I10 PRIMARY HYPERTENSION: ICD-10-CM

## 2023-06-19 PROCEDURE — 76536 US EXAM OF HEAD AND NECK: CPT | Mod: TC

## 2023-06-19 PROCEDURE — 71271 CT CHEST LUNG SCREENING LOW DOSE: ICD-10-PCS | Mod: 26,,, | Performed by: RADIOLOGY

## 2023-06-19 PROCEDURE — 93880 US CAROTID BILATERAL: ICD-10-PCS | Mod: 26,59,, | Performed by: RADIOLOGY

## 2023-06-19 PROCEDURE — 71271 CT THORAX LUNG CANCER SCR C-: CPT | Mod: 26,,, | Performed by: RADIOLOGY

## 2023-06-19 PROCEDURE — 76536 US EXAM OF HEAD AND NECK: CPT | Mod: 26,,, | Performed by: RADIOLOGY

## 2023-06-19 PROCEDURE — 93880 EXTRACRANIAL BILAT STUDY: CPT | Mod: 26,59,, | Performed by: RADIOLOGY

## 2023-06-19 PROCEDURE — 71271 CT THORAX LUNG CANCER SCR C-: CPT | Mod: TC

## 2023-06-19 PROCEDURE — 76536 US THYROID: ICD-10-PCS | Mod: 26,,, | Performed by: RADIOLOGY

## 2023-06-19 PROCEDURE — 93880 EXTRACRANIAL BILAT STUDY: CPT | Mod: 59,TC

## 2023-07-11 ENCOUNTER — LAB VISIT (OUTPATIENT)
Dept: LAB | Facility: HOSPITAL | Age: 58
End: 2023-07-11
Payer: MEDICAID

## 2023-07-11 DIAGNOSIS — D50.9 IRON DEFICIENCY ANEMIA, UNSPECIFIED IRON DEFICIENCY ANEMIA TYPE: ICD-10-CM

## 2023-07-11 LAB
BASOPHILS # BLD AUTO: 0.02 K/UL (ref 0–0.2)
BASOPHILS NFR BLD: 0.5 % (ref 0–1.9)
DIFFERENTIAL METHOD: ABNORMAL
EOSINOPHIL # BLD AUTO: 0.1 K/UL (ref 0–0.5)
EOSINOPHIL NFR BLD: 2.5 % (ref 0–8)
ERYTHROCYTE [DISTWIDTH] IN BLOOD BY AUTOMATED COUNT: 18.5 % (ref 11.5–14.5)
HCT VFR BLD AUTO: 36.4 % (ref 37–48.5)
HGB BLD-MCNC: 11.3 G/DL (ref 12–16)
IMM GRANULOCYTES # BLD AUTO: 0.01 K/UL (ref 0–0.04)
IMM GRANULOCYTES NFR BLD AUTO: 0.2 % (ref 0–0.5)
LYMPHOCYTES # BLD AUTO: 1 K/UL (ref 1–4.8)
LYMPHOCYTES NFR BLD: 22.6 % (ref 18–48)
MCH RBC QN AUTO: 26 PG (ref 27–31)
MCHC RBC AUTO-ENTMCNC: 31 G/DL (ref 32–36)
MCV RBC AUTO: 84 FL (ref 82–98)
MONOCYTES # BLD AUTO: 0.3 K/UL (ref 0.3–1)
MONOCYTES NFR BLD: 6.6 % (ref 4–15)
NEUTROPHILS # BLD AUTO: 3 K/UL (ref 1.8–7.7)
NEUTROPHILS NFR BLD: 67.6 % (ref 38–73)
NRBC BLD-RTO: 0 /100 WBC
PLATELET # BLD AUTO: 99 K/UL (ref 150–450)
PMV BLD AUTO: 12.5 FL (ref 9.2–12.9)
RBC # BLD AUTO: 4.35 M/UL (ref 4–5.4)
WBC # BLD AUTO: 4.42 K/UL (ref 3.9–12.7)

## 2023-07-11 PROCEDURE — 36415 COLL VENOUS BLD VENIPUNCTURE: CPT | Performed by: STUDENT IN AN ORGANIZED HEALTH CARE EDUCATION/TRAINING PROGRAM

## 2023-07-11 PROCEDURE — 85025 COMPLETE CBC W/AUTO DIFF WBC: CPT | Performed by: STUDENT IN AN ORGANIZED HEALTH CARE EDUCATION/TRAINING PROGRAM

## 2023-07-13 ENCOUNTER — OFFICE VISIT (OUTPATIENT)
Dept: FAMILY MEDICINE | Facility: HOSPITAL | Age: 58
End: 2023-07-13
Payer: MEDICAID

## 2023-07-13 VITALS
WEIGHT: 132.06 LBS | SYSTOLIC BLOOD PRESSURE: 130 MMHG | BODY MASS INDEX: 24.3 KG/M2 | DIASTOLIC BLOOD PRESSURE: 66 MMHG | HEART RATE: 77 BPM | HEIGHT: 62 IN

## 2023-07-13 DIAGNOSIS — R29.818 SUSPECTED SLEEP APNEA: ICD-10-CM

## 2023-07-13 DIAGNOSIS — N18.32 STAGE 3B CHRONIC KIDNEY DISEASE: ICD-10-CM

## 2023-07-13 DIAGNOSIS — Z12.31 ENCOUNTER FOR SCREENING MAMMOGRAM FOR MALIGNANT NEOPLASM OF BREAST: Primary | ICD-10-CM

## 2023-07-13 DIAGNOSIS — D50.0 IRON DEFICIENCY ANEMIA DUE TO CHRONIC BLOOD LOSS: ICD-10-CM

## 2023-07-13 PROBLEM — I65.23 CAROTID STENOSIS, ASYMPTOMATIC, BILATERAL: Status: ACTIVE | Noted: 2023-07-13

## 2023-07-13 PROCEDURE — 99215 OFFICE O/P EST HI 40 MIN: CPT

## 2023-07-13 NOTE — PROGRESS NOTES
[facility]  History & Physical    SUBJECTIVE:     Chief Complaint:   Chief Complaint   Patient presents with    Results    Annual Exam       History of Present Illness:  57 y.o. female who  has a past medical history of Diabetes mellitus type II, Headache associated with hormonal factors, Hyperlipidemia, Hypertension, Lupus, Protein C deficiency, and Tachycardia. presents to clinic today for follow up    #JN  - Patient with iron deficiency anemia hx. Wanting lab review today. Patient with chronic CBC and iron studies indicative of iron deficiency anemia. Etiology still unknown at this time. Patient s/p hysterectomy. EGD last completed in 2017 and case request for colonoscopy completed in 5/23 (patient awaiting scheduling). Patient currently taking prescribed ferrous sulfate since June 2023 and also on chronic xarelto for antiphospholipid syndrome. Patient denies hematochezia, hematemesis, fevers, chills, abdominal pain.  -     #HOLLY  - Patent with noted snoring while sleeping, choking/gag also with sleeping and with history of hypertension. Patient noting she is also usually fatigued after waking up from sleep.       Allergies:  Review of patient's allergies indicates:   Allergen Reactions    Niacin preparations Other (See Comments)     Red inflammed eyes    Codeine Rash and Other (See Comments)       Home Medications:  Current Outpatient Medications on File Prior to Visit   Medication Sig    atorvastatin (LIPITOR) 40 MG tablet Take 1 tablet (40 mg total) by mouth once daily.    ferrous sulfate (FEOSOL) 325 mg (65 mg iron) Tab tablet Take 1 tablet (325 mg total) by mouth daily with breakfast.    gabapentin (NEURONTIN) 600 MG tablet Take 2 tablets (1,200 mg total) by mouth 3 (three) times daily.    hydrALAZINE (APRESOLINE) 50 MG tablet Take 1 tablet (50 mg total) by mouth 3 (three) times daily.    hydrOXYchloroQUINE (PLAQUENIL) 200 mg tablet Take 1 tablet (200 mg total) by mouth 2 (two) times daily.     levocetirizine (XYZAL) 5 MG tablet Take 1 tablet (5 mg total) by mouth daily as needed for Allergies.    losartan (COZAAR) 100 MG tablet Take 1 tablet (100 mg total) by mouth once daily.    metoprolol succinate (TOPROL-XL) 50 MG 24 hr tablet Take 1 tablet (50 mg total) by mouth once daily.    NIFEdipine (PROCARDIA-XL) 60 MG (OSM) 24 hr tablet Take 1 tablet (60 mg total) by mouth once daily.    ondansetron (ZOFRAN) 4 MG tablet Take 1 tablet (4 mg total) by mouth every 12 (twelve) hours as needed for Nausea.    rivaroxaban (XARELTO) 20 mg Tab Take 1 tablet (20 mg total) by mouth daily with dinner or evening meal.    SITagliptin (JANUVIA) 50 MG Tab Take 1 tablet (50 mg total) by mouth once daily.    tiotropium (SPIRIVA) 18 mcg inhalation capsule Inhale 1 capsule (18 mcg total) into the lungs once daily. Controller    traZODone (DESYREL) 50 MG tablet     EScitalopram oxalate (LEXAPRO) 10 MG tablet Take 1 tablet (10 mg total) by mouth once daily. (Patient not taking: Reported on 2023)    fexofenadine (ALLEGRA) 60 MG tablet Take 1 tablet (60 mg total) by mouth once daily.    ipratropium-albuteroL (COMBIVENT)  mcg/actuation inhaler Inhale 2 puffs into the lungs every 6 (six) hours as needed for Wheezing or Shortness of Breath. Rescue (Patient not taking: Reported on 2023)    omeprazole (PRILOSEC) 40 MG capsule Take 1 capsule (40 mg total) by mouth once daily.     No current facility-administered medications on file prior to visit.       Past Medical History:   Diagnosis Date    Diabetes mellitus type II     Headache associated with hormonal factors     Hyperlipidemia     Hypertension     Lupus     Protein C deficiency     Tachycardia      Past Surgical History:   Procedure Laterality Date    AORTIC VALVE SURGERY       SECTION      COLONOSCOPY N/A 2017    Procedure: COLONOSCOPY;  Surgeon: Markos Calvert MD;  Location: [facility];  Service: Endoscopy;  Laterality: N/A;    TOE AMPUTATION       TONSILLECTOMY      WRIST FRACTURE SURGERY       Family History   Problem Relation Age of Onset    Hypertension Mother     Cancer Father     Diabetes Maternal Uncle     Stroke Maternal Grandfather     Colon cancer Paternal Aunt      Social History     Tobacco Use    Smoking status: Every Day     Packs/day: 1.00     Years: 35.00     Pack years: 35.00     Types: Cigarettes    Smokeless tobacco: Never   Substance Use Topics    Alcohol use: No    Drug use: No          OBJECTIVE:     Vital Signs:  Pulse: 77 (07/13/23 1515)  BP: 130/66 (07/13/23 1515)    Physical Exam  Constitutional:       General: She is not in acute distress.     Appearance: Normal appearance. She is not toxic-appearing.   HENT:      Head: Normocephalic and atraumatic.      Right Ear: External ear normal.      Left Ear: External ear normal.      Nose: Nose normal.      Mouth/Throat:      Mouth: Mucous membranes are moist.   Eyes:      Extraocular Movements: Extraocular movements intact.   Cardiovascular:      Rate and Rhythm: Normal rate and regular rhythm.      Pulses: Normal pulses.      Heart sounds: Normal heart sounds.   Pulmonary:      Effort: Pulmonary effort is normal. No respiratory distress.   Abdominal:      General: Abdomen is flat.      Palpations: Abdomen is soft.      Tenderness: There is no abdominal tenderness.   Musculoskeletal:      Cervical back: Normal range of motion and neck supple.   Skin:     General: Skin is warm.      Findings: No bruising or erythema.   Neurological:      General: No focal deficit present.      Mental Status: She is alert.   Psychiatric:         Mood and Affect: Mood normal.       Laboratory:  Hemoglobin A1C   Date Value Ref Range Status   05/29/2023 5.6 4.0 - 5.6 % Final     Comment:     ADA Screening Guidelines:  5.7-6.4%  Consistent with prediabetes  >or=6.5%  Consistent with diabetes    High levels of fetal hemoglobin interfere with the HbA1C  assay. Heterozygous hemoglobin variants (HbS, HgC, etc)do  not  significantly interfere with this assay.   However, presence of multiple variants may affect accuracy.     11/07/2022 5.7 (H) 4.0 - 5.6 % Final     Comment:     ADA Screening Guidelines:  5.7-6.4%  Consistent with prediabetes  >or=6.5%  Consistent with diabetes    High levels of fetal hemoglobin interfere with the HbA1C  assay. Heterozygous hemoglobin variants (HbS, HgC, etc)do  not significantly interfere with this assay.   However, presence of multiple variants may affect accuracy.     03/21/2022 5.7 (H) 4.0 - 5.6 % Final     Comment:     ADA Screening Guidelines:  5.7-6.4%  Consistent with prediabetes  >or=6.5%  Consistent with diabetes    High levels of fetal hemoglobin interfere with the HbA1C  assay. Heterozygous hemoglobin variants (HbS, HgC, etc)do  not significantly interfere with this assay.   However, presence of multiple variants may affect accuracy.         A/P:  Aggie was seen today for results and annual exam.    Diagnoses and all orders for this visit:    Encounter for screening mammogram for malignant neoplasm of breast    Stage 3b chronic kidney disease  -     Ambulatory referral/consult to Nephrology; Future    Suspected sleep apnea  -     Ambulatory referral/consult to Sleep Disorders; Future    Iron deficiency anemia due to chronic blood loss  -     Ambulatory referral/consult to Gastroenterology; Future    Patient here today for follow up. Patient with uptrending CBC however still anemic with unknown etiology. Per chart review, patient with history of gastritis however last EGD completed in 2017. Colonoscopy pending. Will refer to GI as patient with unknown etiology at this time and still on chronic anticoagulation. Will continue ferrous sulfate supplementation. Patient still with need for nephrology follow up as well due to her underlying CKD and hx of lupus will refer today. Also patient with suspected HOLLY and will refer to sleep medicine.    Follow up in 2-3 months    Maximilian Montemayor MD  PGY-3  [facility]

## 2023-07-26 DIAGNOSIS — J44.9 CHRONIC OBSTRUCTIVE PULMONARY DISEASE, UNSPECIFIED COPD TYPE: ICD-10-CM

## 2023-07-26 RX ORDER — TIOTROPIUM BROMIDE 18 UG/1
18 CAPSULE ORAL; RESPIRATORY (INHALATION) DAILY
Qty: 30 CAPSULE | Refills: 2 | Status: CANCELLED | OUTPATIENT
Start: 2023-07-26 | End: 2024-07-25

## 2023-07-26 NOTE — PROGRESS NOTES
I assume primary medical responsibility for this patient. I have reviewed the history, physical, and assessement & treatment plan with the resident and agree that the care is reasonable and necessary. This service has been performed by a resident without the presence of a teaching physician under the primary care exception. If necessary, an addendum of additional findings or evaluation beyond the resident documentation will be noted below.      Erin Van MD    [facility]

## 2023-07-26 NOTE — TELEPHONE ENCOUNTER
----- Message from Dayana Yoder sent at 7/26/2023  9:21 AM CDT -----  Regarding: refill on med  Pt called in for a refill on.....tiotropium (SPIRIVA) 18 mcg inhalation capsule.. send to [facility] .. call back # [phone]

## 2023-07-31 DIAGNOSIS — J44.9 CHRONIC OBSTRUCTIVE PULMONARY DISEASE, UNSPECIFIED COPD TYPE: ICD-10-CM

## 2023-07-31 RX ORDER — TIOTROPIUM BROMIDE 18 UG/1
18 CAPSULE ORAL; RESPIRATORY (INHALATION) DAILY
Qty: 30 CAPSULE | Refills: 2 | Status: SHIPPED | OUTPATIENT
Start: 2023-07-31 | End: 2023-10-03 | Stop reason: SDUPTHER

## 2023-07-31 NOTE — TELEPHONE ENCOUNTER
----- Message from Jean Marie Barnett sent at 7/31/2023 11:53 AM CDT -----  Regarding: needs refills  Please refill tiotropium (SPIRIVA) 18 mcg inhalation capsule & send to "[id number] - [place], [place] - [address] AT [facility] OF [facility] & [address]. Call back # [phone]

## 2023-08-01 ENCOUNTER — PATIENT MESSAGE (OUTPATIENT)
Dept: GASTROENTEROLOGY | Facility: CLINIC | Age: 58
End: 2023-08-01
Payer: MEDICAID

## 2023-08-01 ENCOUNTER — TELEPHONE (OUTPATIENT)
Dept: GASTROENTEROLOGY | Facility: CLINIC | Age: 58
End: 2023-08-01
Payer: MEDICAID

## 2023-08-01 NOTE — LETTER
August 1, 2023    Aggie Rasheed  [address] [address]             [facility]  [address] [place]   [place] [address]  Phone: [phone]  Fax: [phone] Dear Ms. Rasheed:    We have attempted to contact you to schedule a screening colonoscopy that was ordered by your doctor. Please contact the office to schedule at [phone].        If you have any questions or concerns, please don't hesitate to call.    Sincerely,        Aggie Dinh MD

## 2023-08-02 ENCOUNTER — TELEPHONE (OUTPATIENT)
Dept: GASTROENTEROLOGY | Facility: CLINIC | Age: 58
End: 2023-08-02
Payer: MEDICAID

## 2023-08-02 ENCOUNTER — PATIENT MESSAGE (OUTPATIENT)
Dept: GASTROENTEROLOGY | Facility: CLINIC | Age: 58
End: 2023-08-02
Payer: MEDICAID

## 2023-08-02 NOTE — TELEPHONE ENCOUNTER
8/2 received msg pt tried to call back yesterday - gave a call back and did not reach her. VM not set up. will send msg in portal

## 2023-08-14 DIAGNOSIS — D50.9 IRON DEFICIENCY ANEMIA, UNSPECIFIED IRON DEFICIENCY ANEMIA TYPE: Primary | ICD-10-CM

## 2023-08-15 ENCOUNTER — LAB VISIT (OUTPATIENT)
Dept: LAB | Facility: HOSPITAL | Age: 58
End: 2023-08-15
Payer: MEDICAID

## 2023-08-15 DIAGNOSIS — D50.9 IRON DEFICIENCY ANEMIA, UNSPECIFIED IRON DEFICIENCY ANEMIA TYPE: ICD-10-CM

## 2023-08-15 LAB
FERRITIN SERPL-MCNC: 42 NG/ML (ref 20–300)
IRON SERPL-MCNC: 99 UG/DL (ref 30–160)
SATURATED IRON: 30 % (ref 20–50)
TOTAL IRON BINDING CAPACITY: 329 UG/DL (ref 250–450)
TRANSFERRIN SERPL-MCNC: 222 MG/DL (ref 200–375)

## 2023-08-15 PROCEDURE — 82728 ASSAY OF FERRITIN: CPT

## 2023-08-15 PROCEDURE — 84466 ASSAY OF TRANSFERRIN: CPT

## 2023-08-15 PROCEDURE — 36415 COLL VENOUS BLD VENIPUNCTURE: CPT

## 2023-10-03 ENCOUNTER — HOSPITAL ENCOUNTER (OUTPATIENT)
Dept: RADIOLOGY | Facility: HOSPITAL | Age: 58
Discharge: HOME | End: 2023-10-03
Payer: MEDICAID

## 2023-10-03 ENCOUNTER — OFFICE VISIT (OUTPATIENT)
Dept: FAMILY MEDICINE | Facility: HOSPITAL | Age: 58
End: 2023-10-03
Payer: MEDICAID

## 2023-10-03 VITALS
WEIGHT: 134.69 LBS | DIASTOLIC BLOOD PRESSURE: 75 MMHG | SYSTOLIC BLOOD PRESSURE: 159 MMHG | HEIGHT: 62 IN | HEART RATE: 80 BPM | BODY MASS INDEX: 24.78 KG/M2

## 2023-10-03 DIAGNOSIS — M25.561 CHRONIC PAIN OF BOTH KNEES: ICD-10-CM

## 2023-10-03 DIAGNOSIS — M32.8 OTHER FORMS OF SYSTEMIC LUPUS ERYTHEMATOSUS, UNSPECIFIED ORGAN INVOLVEMENT STATUS: ICD-10-CM

## 2023-10-03 DIAGNOSIS — N18.32 STAGE 3B CHRONIC KIDNEY DISEASE: ICD-10-CM

## 2023-10-03 DIAGNOSIS — M25.562 CHRONIC PAIN OF BOTH KNEES: ICD-10-CM

## 2023-10-03 DIAGNOSIS — E78.1 HYPERTRIGLYCERIDEMIA: ICD-10-CM

## 2023-10-03 DIAGNOSIS — G89.29 CHRONIC PAIN OF BOTH KNEES: ICD-10-CM

## 2023-10-03 DIAGNOSIS — J44.9 CHRONIC OBSTRUCTIVE PULMONARY DISEASE, UNSPECIFIED COPD TYPE: ICD-10-CM

## 2023-10-03 DIAGNOSIS — D68.59 PROTEIN C DEFICIENCY: ICD-10-CM

## 2023-10-03 DIAGNOSIS — E11.9 TYPE 2 DIABETES MELLITUS WITHOUT COMPLICATION, WITHOUT LONG-TERM CURRENT USE OF INSULIN: ICD-10-CM

## 2023-10-03 DIAGNOSIS — N30.00 ACUTE CYSTITIS WITHOUT HEMATURIA: Primary | ICD-10-CM

## 2023-10-03 DIAGNOSIS — I10 ESSENTIAL HYPERTENSION: ICD-10-CM

## 2023-10-03 PROCEDURE — 73562 X-RAY EXAM OF KNEE 3: CPT | Mod: 26,RT,, | Performed by: RADIOLOGY

## 2023-10-03 PROCEDURE — 99214 OFFICE O/P EST MOD 30 MIN: CPT

## 2023-10-03 PROCEDURE — 73562 X-RAY EXAM OF KNEE 3: CPT | Mod: 26,LT,, | Performed by: RADIOLOGY

## 2023-10-03 PROCEDURE — 73562 PR  X-RAY KNEE 3 VIEW: ICD-10-PCS | Mod: 26,LT,, | Performed by: RADIOLOGY

## 2023-10-03 PROCEDURE — 73562 X-RAY EXAM OF KNEE 3: CPT | Mod: TC,50,FY

## 2023-10-03 RX ORDER — HYDROXYCHLOROQUINE SULFATE 200 MG/1
200 TABLET, FILM COATED ORAL 2 TIMES DAILY
Qty: 180 TABLET | Refills: 3 | Status: SHIPPED | OUTPATIENT
Start: 2023-10-03 | End: 2023-11-13 | Stop reason: SDUPTHER

## 2023-10-03 RX ORDER — LOSARTAN POTASSIUM 100 MG/1
100 TABLET ORAL DAILY
Qty: 90 TABLET | Refills: 1 | Status: SHIPPED | OUTPATIENT
Start: 2023-10-03 | End: 2023-11-13 | Stop reason: SDUPTHER

## 2023-10-03 RX ORDER — TIOTROPIUM BROMIDE 18 UG/1
18 CAPSULE ORAL; RESPIRATORY (INHALATION) DAILY
Qty: 30 CAPSULE | Refills: 2 | Status: SHIPPED | OUTPATIENT
Start: 2023-10-03 | End: 2024-01-03 | Stop reason: SDUPTHER

## 2023-10-03 RX ORDER — DICLOFENAC SODIUM 10 MG/G
2 GEL TOPICAL 4 TIMES DAILY
Qty: 350 G | Refills: 1 | Status: SHIPPED | OUTPATIENT
Start: 2023-10-03

## 2023-10-03 RX ORDER — ATORVASTATIN CALCIUM 40 MG/1
40 TABLET, FILM COATED ORAL DAILY
Qty: 90 TABLET | Refills: 3 | Status: SHIPPED | OUTPATIENT
Start: 2023-10-03 | End: 2023-11-28 | Stop reason: SDUPTHER

## 2023-10-03 RX ORDER — HYDRALAZINE HYDROCHLORIDE 50 MG/1
50 TABLET, FILM COATED ORAL 3 TIMES DAILY
Qty: 90 TABLET | Refills: 11 | Status: SHIPPED | OUTPATIENT
Start: 2023-10-03 | End: 2023-11-28 | Stop reason: SDUPTHER

## 2023-10-03 RX ORDER — NITROFURANTOIN 25; 75 MG/1; MG/1
100 CAPSULE ORAL 2 TIMES DAILY
Qty: 10 CAPSULE | Refills: 5 | Status: SHIPPED | OUTPATIENT
Start: 2023-10-03

## 2023-10-03 NOTE — PROGRESS NOTES
[facility]  History & Physical    SUBJECTIVE:     Chief Complaint:   Chief Complaint   Patient presents with    Urinary Tract Infection       History of Present Illness:  58 y.o. female who  has a past medical history of Diabetes mellitus type II, Headache associated with hormonal factors, Hyperlipidemia, Hypertension, Lupus, Protein C deficiency, and Tachycardia. presents to clinic today for follow up and medication refill    #UTI symptoms   Patient with symptoms of UTI for the past five days. Patient states that she may have wiped incorrectly and now is feeling symptoms of suprapubic pain, dysuria, and increased urinary frequency. Patient states she usually has these symptoms when she has a UTI.    #Knee pain  Patient with chronic knee pain. Patient has had injections in the past before in this clinic. Requesting imaging today.    #Medication refill  Patient also with need for chronic medication refill. Patient going to help her family member in [place] and will need her medications refilled today prior to her trip.       Allergies:  Review of patient's allergies indicates:   Allergen Reactions    Niacin preparations Other (See Comments)     Red inflammed eyes    Codeine Rash and Other (See Comments)       Home Medications:  Current Outpatient Medications on File Prior to Visit   Medication Sig    levocetirizine (XYZAL) 5 MG tablet Take 1 tablet (5 mg total) by mouth daily as needed for Allergies.    metoprolol succinate (TOPROL-XL) 50 MG 24 hr tablet Take 1 tablet (50 mg total) by mouth once daily.    NIFEdipine (PROCARDIA-XL) 60 MG (OSM) 24 hr tablet Take 1 tablet (60 mg total) by mouth once daily.    EScitalopram oxalate (LEXAPRO) 10 MG tablet Take 1 tablet (10 mg total) by mouth once daily. (Patient not taking: Reported on 7/13/2023)    fexofenadine (ALLEGRA) 60 MG tablet Take 1 tablet (60 mg total) by mouth once daily.    ipratropium-albuteroL (COMBIVENT)  mcg/actuation inhaler Inhale 2 puffs  into the lungs every 6 (six) hours as needed for Wheezing or Shortness of Breath. Rescue (Patient not taking: Reported on 10/3/2023)    omeprazole (PRILOSEC) 40 MG capsule Take 1 capsule (40 mg total) by mouth once daily.    traZODone (DESYREL) 50 MG tablet      No current facility-administered medications on file prior to visit.       Past Medical History:   Diagnosis Date    Diabetes mellitus type II     Headache associated with hormonal factors     Hyperlipidemia     Hypertension     Lupus     Protein C deficiency     Tachycardia      Past Surgical History:   Procedure Laterality Date    AORTIC VALVE SURGERY       SECTION      COLONOSCOPY N/A 2017    Procedure: COLONOSCOPY;  Surgeon: Markos Calvert MD;  Location: [facility];  Service: Endoscopy;  Laterality: N/A;    TOE AMPUTATION      TONSILLECTOMY      WRIST FRACTURE SURGERY       Family History   Problem Relation Age of Onset    Hypertension Mother     Cancer Father     Diabetes Maternal Uncle     Stroke Maternal Grandfather     Colon cancer Paternal Aunt      Social History     Tobacco Use    Smoking status: Every Day     Current packs/day: 1.00     Average packs/day: 1 pack/day for 35.0 years (35.0 ttl pk-yrs)     Types: Cigarettes    Smokeless tobacco: Never   Substance Use Topics    Alcohol use: No    Drug use: No          OBJECTIVE:     Vital Signs:  Pulse: 80 (10/03/23 1007)  BP: (!) 159/75 (10/03/23 1007)    Physical Exam  Constitutional:       General: She is not in acute distress.     Appearance: Normal appearance. She is not toxic-appearing.   HENT:      Head: Normocephalic and atraumatic.      Right Ear: External ear normal.      Left Ear: External ear normal.      Nose: Nose normal.      Mouth/Throat:      Mouth: Mucous membranes are moist.   Eyes:      Extraocular Movements: Extraocular movements intact.   Cardiovascular:      Rate and Rhythm: Normal rate and regular rhythm.      Pulses: Normal pulses.      Heart sounds: Normal  heart sounds.   Pulmonary:      Effort: Pulmonary effort is normal. No respiratory distress.   Abdominal:      General: Abdomen is flat.      Palpations: Abdomen is soft.      Tenderness: There is no abdominal tenderness.   Musculoskeletal:      Cervical back: Normal range of motion and neck supple.   Skin:     General: Skin is warm.      Findings: No bruising or erythema.   Neurological:      General: No focal deficit present.      Mental Status: She is alert.   Psychiatric:         Mood and Affect: Mood normal.         Laboratory:  Hemoglobin A1C   Date Value Ref Range Status   05/29/2023 5.6 4.0 - 5.6 % Final     Comment:     ADA Screening Guidelines:  5.7-6.4%  Consistent with prediabetes  >or=6.5%  Consistent with diabetes    High levels of fetal hemoglobin interfere with the HbA1C  assay. Heterozygous hemoglobin variants (HbS, HgC, etc)do  not significantly interfere with this assay.   However, presence of multiple variants may affect accuracy.     11/07/2022 5.7 (H) 4.0 - 5.6 % Final     Comment:     ADA Screening Guidelines:  5.7-6.4%  Consistent with prediabetes  >or=6.5%  Consistent with diabetes    High levels of fetal hemoglobin interfere with the HbA1C  assay. Heterozygous hemoglobin variants (HbS, HgC, etc)do  not significantly interfere with this assay.   However, presence of multiple variants may affect accuracy.     03/21/2022 5.7 (H) 4.0 - 5.6 % Final     Comment:     ADA Screening Guidelines:  5.7-6.4%  Consistent with prediabetes  >or=6.5%  Consistent with diabetes    High levels of fetal hemoglobin interfere with the HbA1C  assay. Heterozygous hemoglobin variants (HbS, HgC, etc)do  not significantly interfere with this assay.   However, presence of multiple variants may affect accuracy.         A/P:  Aggie was seen today for urinary tract infection.    Diagnoses and all orders for this visit:    Acute cystitis without hematuria  -     nitrofurantoin, macrocrystal-monohydrate, (MACROBID) 100  MG capsule; Take 1 capsule (100 mg total) by mouth 2 (two) times daily.    Chronic pain of both knees  -     diclofenac sodium (VOLTAREN) 1 % Gel; Apply 2 g topically 4 (four) times daily. (Patient not taking: Reported on 10/20/2023)  -     X-ray Knee Ortho Bilateral; Future    Stage 3b chronic kidney disease  -     Ambulatory referral/consult to Nephrology; Future    Essential hypertension  -     losartan (COZAAR) 100 MG tablet; Take 1 tablet (100 mg total) by mouth once daily.  -     hydrALAZINE (APRESOLINE) 50 MG tablet; Take 1 tablet (50 mg total) by mouth 3 (three) times daily.    Type 2 diabetes mellitus without complication, without long-term current use of insulin  -     Discontinue: SITagliptin phosphate (JANUVIA) 50 MG Tab; Take 1 tablet (50 mg total) by mouth once daily.    Other forms of systemic lupus erythematosus, unspecified organ involvement status  -     hydroxychloroquine (PLAQUENIL) 200 mg tablet; Take 1 tablet (200 mg total) by mouth 2 (two) times daily.    Protein C deficiency  -     rivaroxaban (XARELTO) 20 mg Tab; Take 1 tablet (20 mg total) by mouth daily with dinner or evening meal.    Hypertriglyceridemia  -     atorvastatin (LIPITOR) 40 MG tablet; Take 1 tablet (40 mg total) by mouth once daily.    Chronic obstructive pulmonary disease, unspecified COPD type  -     tiotropium (SPIRIVA) 18 mcg inhalation capsule; Inhale 1 capsule (18 mcg total) into the lungs once daily. Controller    POCT UA taken in clinic and notable for +nitrate and +leukocytes. Will treat today with macrobid. Will also obtain knee xrays for patient's chronic knee pain and treat conservatively with voltaren gel prn. Chronic medications refilled today    Follow up in 1-2 months or prn    Maximilian Montemayor MD PGY-3  [facility]

## 2023-10-06 ENCOUNTER — TELEPHONE (OUTPATIENT)
Dept: FAMILY MEDICINE | Facility: HOSPITAL | Age: 58
End: 2023-10-06
Payer: MEDICAID

## 2023-10-06 DIAGNOSIS — G62.9 PERIPHERAL POLYNEUROPATHY: ICD-10-CM

## 2023-10-06 DIAGNOSIS — E11.9 TYPE 2 DIABETES MELLITUS WITHOUT COMPLICATION, WITHOUT LONG-TERM CURRENT USE OF INSULIN: ICD-10-CM

## 2023-10-06 RX ORDER — GABAPENTIN 600 MG/1
1200 TABLET ORAL 3 TIMES DAILY
Qty: 180 TABLET | Refills: 2 | Status: SHIPPED | OUTPATIENT
Start: 2023-10-06 | End: 2024-01-04

## 2023-10-06 NOTE — TELEPHONE ENCOUNTER
Reordered medications to [facility]    Maximilian Montemayor MD PGY-3  [facility]

## 2023-10-06 NOTE — TELEPHONE ENCOUNTER
----- Message from Kelli Pérez LPN sent at 10/5/2023  3:00 PM CDT -----  Regarding: FW: prior authorization    ----- Message -----  From: Dayana Yoder  Sent: 10/5/2023   2:55 PM CDT  To: [facility] Staff  Subject: prior authorization                              Pt called for a prior authorization on her medicine because pt said she is going out of town.. gabapentin (NEURONTIN) 600 MG tablet and SITagliptin phosphate (JANUVIA) 50 MG ... Send to [id number] - [place], [place] - [address] AT [facility] [address]   Phone:  [phone]  Fax:  [phone]      Pt # [phone] (H)

## 2023-10-09 ENCOUNTER — TELEPHONE (OUTPATIENT)
Dept: HEPATOLOGY | Facility: HOSPITAL | Age: 58
End: 2023-10-09

## 2023-10-09 NOTE — TELEPHONE ENCOUNTER
----- Message from Kelli Pérez LPN sent at 10/6/2023  9:26 AM CDT -----  Regarding: FW: prior authorization    ----- Message -----  From: Dayana Yoder  Sent: 10/6/2023   9:18 AM CDT  To: [facility] Staff  Subject: prior authorization                              Pt called and said she needs a prior authorization for her medicine... gabapentin (NEURONTIN) 600 MG tablet and SITagliptin phosphate (JANUVIA) 50 MG Tab.. send to [facility]   Phone:  [phone]  Fax:  [phone]      Pt # [phone] (M)

## 2023-10-20 ENCOUNTER — OFFICE VISIT (OUTPATIENT)
Dept: FAMILY MEDICINE | Facility: HOSPITAL | Age: 58
End: 2023-10-20
Payer: MEDICAID

## 2023-10-20 VITALS
WEIGHT: 132.56 LBS | HEART RATE: 73 BPM | HEIGHT: 62 IN | DIASTOLIC BLOOD PRESSURE: 70 MMHG | BODY MASS INDEX: 24.39 KG/M2 | SYSTOLIC BLOOD PRESSURE: 173 MMHG

## 2023-10-20 DIAGNOSIS — J32.0 CHRONIC MAXILLARY SINUSITIS: ICD-10-CM

## 2023-10-20 DIAGNOSIS — L29.9 PRURITUS: Primary | ICD-10-CM

## 2023-10-20 PROCEDURE — 99214 OFFICE O/P EST MOD 30 MIN: CPT | Performed by: STUDENT IN AN ORGANIZED HEALTH CARE EDUCATION/TRAINING PROGRAM

## 2023-10-20 RX ORDER — AMOXICILLIN AND CLAVULANATE POTASSIUM 875; 125 MG/1; MG/1
1 TABLET, FILM COATED ORAL EVERY 12 HOURS
Qty: 14 TABLET | Refills: 0 | Status: SHIPPED | OUTPATIENT
Start: 2023-10-20 | End: 2023-10-27

## 2023-10-20 RX ORDER — HYDROXYZINE HYDROCHLORIDE 10 MG/1
10 TABLET, FILM COATED ORAL 3 TIMES DAILY PRN
Qty: 60 TABLET | Refills: 0 | Status: SHIPPED | OUTPATIENT
Start: 2023-10-20 | End: 2023-12-19

## 2023-10-20 NOTE — PROGRESS NOTES
"[facility]  History & Physical    SUBJECTIVE:     Chief Complaint:   Chief Complaint   Patient presents with    Cough    Sinus Problem    Sore Throat    Rash     RIGHT       History of Present Illness:  58 y.o. female who  has a past medical history of Diabetes mellitus type II, Headache associated with hormonal factors, Hyperlipidemia, Hypertension, Lupus, Protein C deficiency, and Tachycardia. presents to clinic today for sore throat and itching. Patient reports sore throat and congestion for multiple days. Patient states she has been coughing more than usual. Patient also reports 4 weeks of itching and rash on her hands and arms. The itching is presents with small "pimple like" bumps. Patient says she has not head an episode like this before. Patient has tried levocetirizine but with no effect. Patient has not used any over the counter topical agents for itching or decongestants for cough or congestion. Patient reports she has had a mild fever at the beginning of the week. Patient also reports a history of bacterial sinusitis in previous years.      Allergies:  Review of patient's allergies indicates:   Allergen Reactions    Niacin preparations Other (See Comments)     Red inflammed eyes    Codeine Rash and Other (See Comments)       Home Medications:  Current Outpatient Medications on File Prior to Visit   Medication Sig    atorvastatin (LIPITOR) 40 MG tablet Take 1 tablet (40 mg total) by mouth once daily.    gabapentin (NEURONTIN) 600 MG tablet Take 2 tablets (1,200 mg total) by mouth 3 (three) times daily.    hydrALAZINE (APRESOLINE) 50 MG tablet Take 1 tablet (50 mg total) by mouth 3 (three) times daily.    hydroxychloroquine (PLAQUENIL) 200 mg tablet Take 1 tablet (200 mg total) by mouth 2 (two) times daily.    levocetirizine (XYZAL) 5 MG tablet Take 1 tablet (5 mg total) by mouth daily as needed for Allergies.    losartan (COZAAR) 100 MG tablet Take 1 tablet (100 mg total) by mouth once daily.    " metoprolol succinate (TOPROL-XL) 50 MG 24 hr tablet Take 1 tablet (50 mg total) by mouth once daily.    NIFEdipine (PROCARDIA-XL) 60 MG (OSM) 24 hr tablet Take 1 tablet (60 mg total) by mouth once daily.    nitrofurantoin, macrocrystal-monohydrate, (MACROBID) 100 MG capsule Take 1 capsule (100 mg total) by mouth 2 (two) times daily.    rivaroxaban (XARELTO) 20 mg Tab Take 1 tablet (20 mg total) by mouth daily with dinner or evening meal.    SITagliptin phosphate (JANUVIA) 50 MG Tab Take 1 tablet (50 mg total) by mouth once daily.    tiotropium (SPIRIVA) 18 mcg inhalation capsule Inhale 1 capsule (18 mcg total) into the lungs once daily. Controller    traZODone (DESYREL) 50 MG tablet     diclofenac sodium (VOLTAREN) 1 % Gel Apply 2 g topically 4 (four) times daily. (Patient not taking: Reported on 10/20/2023)    EScitalopram oxalate (LEXAPRO) 10 MG tablet Take 1 tablet (10 mg total) by mouth once daily. (Patient not taking: Reported on 2023)    fexofenadine (ALLEGRA) 60 MG tablet Take 1 tablet (60 mg total) by mouth once daily.    ipratropium-albuteroL (COMBIVENT)  mcg/actuation inhaler Inhale 2 puffs into the lungs every 6 (six) hours as needed for Wheezing or Shortness of Breath. Rescue (Patient not taking: Reported on 10/3/2023)    omeprazole (PRILOSEC) 40 MG capsule Take 1 capsule (40 mg total) by mouth once daily.     No current facility-administered medications on file prior to visit.       Past Medical History:   Diagnosis Date    Diabetes mellitus type II     Headache associated with hormonal factors     Hyperlipidemia     Hypertension     Lupus     Protein C deficiency     Tachycardia      Past Surgical History:   Procedure Laterality Date    AORTIC VALVE SURGERY       SECTION      COLONOSCOPY N/A 2017    Procedure: COLONOSCOPY;  Surgeon: Markos Calvert MD;  Location: [facility];  Service: Endoscopy;  Laterality: N/A;    TOE AMPUTATION      TONSILLECTOMY      WRIST FRACTURE SURGERY        Family History   Problem Relation Age of Onset    Hypertension Mother     Cancer Father     Diabetes Maternal Uncle     Stroke Maternal Grandfather     Colon cancer Paternal Aunt      Social History     Tobacco Use    Smoking status: Every Day     Current packs/day: 1.00     Average packs/day: 1 pack/day for 35.0 years (35.0 ttl pk-yrs)     Types: Cigarettes    Smokeless tobacco: Never   Substance Use Topics    Alcohol use: No    Drug use: No        Review of Systems   Constitutional:  Negative for fever and weight loss.   HENT:  Positive for congestion and sore throat. Negative for hearing loss.    Eyes:  Negative for blurred vision.   Respiratory:  Positive for cough. Negative for shortness of breath and wheezing.    Cardiovascular:  Negative for chest pain and leg swelling.   Gastrointestinal:  Negative for heartburn, nausea and vomiting.   Genitourinary:  Negative for dysuria.   Musculoskeletal:  Negative for back pain, joint pain and myalgias.   Skin:  Positive for itching and rash.   Neurological:  Negative for dizziness, weakness and headaches.        OBJECTIVE:     Vital Signs:  Pulse: 73 (10/20/23 1054)  BP: (!) 173/70 (10/20/23 1054)  Body mass index is 24.24 kg/m².  Physical Exam  HENT:      Head: Normocephalic.      Right Ear: External ear normal.      Left Ear: External ear normal.      Nose: Congestion present.      Mouth/Throat:      Mouth: Mucous membranes are moist.      Pharynx: Posterior oropharyngeal erythema present.   Eyes:      Extraocular Movements: Extraocular movements intact.   Cardiovascular:      Rate and Rhythm: Normal rate.   Pulmonary:      Effort: Pulmonary effort is normal.   Abdominal:      Palpations: Abdomen is soft.   Neurological:      Mental Status: She is alert.       Laboratory:  Hemoglobin A1C   Date Value Ref Range Status   05/29/2023 5.6 4.0 - 5.6 % Final     Comment:     ADA Screening Guidelines:  5.7-6.4%  Consistent with prediabetes  >or=6.5%  Consistent with  diabetes    High levels of fetal hemoglobin interfere with the HbA1C  assay. Heterozygous hemoglobin variants (HbS, HgC, etc)do  not significantly interfere with this assay.   However, presence of multiple variants may affect accuracy.     11/07/2022 5.7 (H) 4.0 - 5.6 % Final     Comment:     ADA Screening Guidelines:  5.7-6.4%  Consistent with prediabetes  >or=6.5%  Consistent with diabetes    High levels of fetal hemoglobin interfere with the HbA1C  assay. Heterozygous hemoglobin variants (HbS, HgC, etc)do  not significantly interfere with this assay.   However, presence of multiple variants may affect accuracy.     03/21/2022 5.7 (H) 4.0 - 5.6 % Final     Comment:     ADA Screening Guidelines:  5.7-6.4%  Consistent with prediabetes  >or=6.5%  Consistent with diabetes    High levels of fetal hemoglobin interfere with the HbA1C  assay. Heterozygous hemoglobin variants (HbS, HgC, etc)do  not significantly interfere with this assay.   However, presence of multiple variants may affect accuracy.         A/P:  Aggie was seen today for cough, sinus problem, sore throat and rash. Suspect sore throat is secondary to nasal drip in a setting of nasal congestion. Likely an allergic reaction to a new allergen. Will provide antibiotics for possible sinusitis given patients comorbid conditions.     Diagnoses and all orders for this visit:    Pruritus  -     hydrOXYzine HCL (ATARAX) 10 MG Tab; Take 1 tablet (10 mg total) by mouth 3 (three) times daily as needed (itching).        -     Apply Topical OTC hydrocortisone to affected areas  Chronic maxillary sinusitis  -     amoxicillin-clavulanate 875-125mg (AUGMENTIN) 875-125 mg per tablet; Take 1 tablet by mouth every 12 (twelve) hours. for 7 days         -    Flonase daily       Follow up in about 2 weeks (around 11/3/2023), or if symptoms worsen or fail to improve.      Jeremías Mc MD  \[facility]\"" Family Medicine PGY-3  10/23/2023

## 2023-11-09 DIAGNOSIS — I10 ESSENTIAL HYPERTENSION: ICD-10-CM

## 2023-11-09 DIAGNOSIS — R11.0 NAUSEA: ICD-10-CM

## 2023-11-09 DIAGNOSIS — M32.8 OTHER FORMS OF SYSTEMIC LUPUS ERYTHEMATOSUS, UNSPECIFIED ORGAN INVOLVEMENT STATUS: ICD-10-CM

## 2023-11-09 NOTE — TELEPHONE ENCOUNTER
----- Message from Dayana Yoder sent at 11/9/2023 12:08 PM CST -----  Regarding: refill on med  Pt called for a refill on her meds... hydroxychloroquine (PLAQUENIL) 200 mg tablet...losartan (COZAAR) 100 MG tablet and Zofran.. [facility] to [id number] - [place], [place] - [address] AT [facility] [address]   Phone:  [phone]  Fax:  [phone]      Pt # [phone] KELLE

## 2023-11-09 NOTE — TELEPHONE ENCOUNTER
Refill Routing Note   Medication(s) are not appropriate for processing by [facility] for the following reason(s):      Non-participating provider    [facility] action(s):  Route Care Due:  None identified            Appointments  past 12m or future 3m with PCP    Date Provider   Last Visit   5/29/2023 June Mathew MD   Next Visit   Visit date not found June Mathew MD   ED visits in past 90 days: 0        Note composed:12:11 PM 11/09/2023

## 2023-11-13 RX ORDER — HYDROXYCHLOROQUINE SULFATE 200 MG/1
TABLET, FILM COATED ORAL
Qty: 90 TABLET | Refills: 0 | Status: SHIPPED | OUTPATIENT
Start: 2023-11-13 | End: 2023-11-14 | Stop reason: SDUPTHER

## 2023-11-13 RX ORDER — ONDANSETRON 4 MG/1
4 TABLET, FILM COATED ORAL EVERY 12 HOURS PRN
Qty: 30 TABLET | Refills: 4 | Status: SHIPPED | OUTPATIENT
Start: 2023-11-13 | End: 2024-11-12

## 2023-11-13 RX ORDER — LOSARTAN POTASSIUM 100 MG/1
100 TABLET ORAL
Qty: 90 TABLET | Refills: 1 | Status: SHIPPED | OUTPATIENT
Start: 2023-11-13

## 2023-11-14 DIAGNOSIS — M32.8 OTHER FORMS OF SYSTEMIC LUPUS ERYTHEMATOSUS, UNSPECIFIED ORGAN INVOLVEMENT STATUS: ICD-10-CM

## 2023-11-14 RX ORDER — HYDROXYCHLOROQUINE SULFATE 200 MG/1
TABLET, FILM COATED ORAL
Qty: 90 TABLET | Refills: 0 | Status: SHIPPED | OUTPATIENT
Start: 2023-11-14

## 2023-11-28 DIAGNOSIS — I10 ESSENTIAL HYPERTENSION: ICD-10-CM

## 2023-11-28 DIAGNOSIS — E78.1 HYPERTRIGLYCERIDEMIA: ICD-10-CM

## 2023-11-28 DIAGNOSIS — M32.8 OTHER FORMS OF SYSTEMIC LUPUS ERYTHEMATOSUS, UNSPECIFIED ORGAN INVOLVEMENT STATUS: ICD-10-CM

## 2023-11-28 NOTE — TELEPHONE ENCOUNTER
----- Message from Dayana Yoder sent at 11/28/2023  2:42 PM CST -----  Regarding: refill  Pt is requesting a refill for..  metoprolol succinate (TOPROL-XL) 50 MG 24 hr tablet  atorvastatin (LIPITOR) 40 MG tablet  rivaroxaban (XARELTO) 20 mg Tab  hydroxychloroquine (PLAQUENIL) 200 mg tablet  hydrALAZINE (APRESOLINE) 50 MG tablet  NIFEdipine (PROCARDIA-XL) 60 MG (OSM) 24 hr tablet    Send to [id number] - [place], [place] - [address] AT [facility] [address]    Pt # [phone]

## 2023-12-01 RX ORDER — ATORVASTATIN CALCIUM 40 MG/1
40 TABLET, FILM COATED ORAL DAILY
Qty: 90 TABLET | Refills: 3 | Status: SHIPPED | OUTPATIENT
Start: 2023-12-01 | End: 2024-11-30

## 2023-12-01 RX ORDER — METOPROLOL SUCCINATE 50 MG/1
50 TABLET, EXTENDED RELEASE ORAL DAILY
Qty: 30 TABLET | Refills: 11 | Status: SHIPPED | OUTPATIENT
Start: 2023-12-01 | End: 2024-11-30

## 2023-12-01 RX ORDER — NIFEDIPINE 60 MG/1
60 TABLET, EXTENDED RELEASE ORAL DAILY
Qty: 90 TABLET | Refills: 3 | Status: SHIPPED | OUTPATIENT
Start: 2023-12-01 | End: 2024-11-30

## 2023-12-01 RX ORDER — HYDRALAZINE HYDROCHLORIDE 50 MG/1
50 TABLET, FILM COATED ORAL 3 TIMES DAILY
Qty: 90 TABLET | Refills: 11 | Status: SHIPPED | OUTPATIENT
Start: 2023-12-01 | End: 2024-11-30

## 2024-01-03 DIAGNOSIS — J44.9 CHRONIC OBSTRUCTIVE PULMONARY DISEASE, UNSPECIFIED COPD TYPE: ICD-10-CM

## 2024-01-03 RX ORDER — TIOTROPIUM BROMIDE 18 UG/1
18 CAPSULE ORAL; RESPIRATORY (INHALATION) DAILY
Qty: 30 CAPSULE | Refills: 2 | Status: SHIPPED | OUTPATIENT
Start: 2024-01-03 | End: 2025-01-02

## 2024-01-03 NOTE — TELEPHONE ENCOUNTER
----- Message from Lm Benedict sent at 1/3/2024  8:34 AM CST -----  Type:  RX Refill Request    Who Called: pt   Refill or New Rx:refill   RX Name and Strength:tiotropium (SPIRIVA) 18 mcg inhalation capsule  Preferred Pharmacy with phone number:[id number] - [place] - [address] AT [facility] [place] [address]  Local or Mail Order:local   Ordering Provider:Sim   Would the patient rather a call back or a response via [facility]? Call   Best Call Back Number:[phone]  Additional Information:

## 2024-02-12 ENCOUNTER — TELEPHONE (OUTPATIENT)
Dept: GASTROENTEROLOGY | Facility: CLINIC | Age: 59
End: 2024-02-12
Payer: MEDICAID

## 2024-02-12 NOTE — TELEPHONE ENCOUNTER
Contacted patient to schedule a colonoscopy. No answer and no voice mail set up. Will send letter.

## 2024-02-12 NOTE — LETTER
February 15, 2024    Aggie Rasheed  [address] [address]             [facility]  [address]  [place] [address]  Phone: [phone]  Fax: [phone] Dear Ms. Rasheed:    We have attempted to contact you to schedule a screening colonoscopy that was ordered by your doctor. Please contact the office to schedule at [phone].      If you have any questions or concerns, please don't hesitate to call.    Sincerely,        Kiah Bravo MA

## 2024-03-07 ENCOUNTER — TELEPHONE (OUTPATIENT)
Dept: GASTROENTEROLOGY | Facility: CLINIC | Age: 59
End: 2024-03-07
Payer: MEDICAID

## 2024-03-07 NOTE — TELEPHONE ENCOUNTER
Clinic appt scheduled with pt on Monday, March 18, 2024 at 1pm from referral by Dr. Garza.  Clinic address given and repeated correctly.

## 2024-03-13 ENCOUNTER — CLINICAL SUPPORT (OUTPATIENT)
Dept: REHABILITATION | Facility: HOSPITAL | Age: 59
End: 2024-03-13
Payer: MEDICAID

## 2024-03-13 DIAGNOSIS — M25.512 ACUTE PAIN OF LEFT SHOULDER: Primary | ICD-10-CM

## 2024-03-13 DIAGNOSIS — M25.612 DECREASED ROM OF LEFT SHOULDER: ICD-10-CM

## 2024-03-13 DIAGNOSIS — R29.898 DECREASED STRENGTH OF UPPER EXTREMITY: ICD-10-CM

## 2024-03-13 PROBLEM — R53.1 DECREASED STRENGTH: Status: RESOLVED | Noted: 2021-04-20 | Resolved: 2024-03-13

## 2024-03-13 PROBLEM — M53.86 DECREASED ROM OF INTERVERTEBRAL DISCS OF LUMBAR SPINE: Status: RESOLVED | Noted: 2021-04-20 | Resolved: 2024-03-13

## 2024-03-13 PROBLEM — Z74.09 DECREASED MOBILITY AND ENDURANCE: Status: RESOLVED | Noted: 2021-04-20 | Resolved: 2024-03-13

## 2024-03-13 PROCEDURE — 97162 PT EVAL MOD COMPLEX 30 MIN: CPT | Mod: PN

## 2024-03-13 PROCEDURE — 97110 THERAPEUTIC EXERCISES: CPT | Mod: PN

## 2024-03-13 NOTE — PLAN OF CARE
[facility]   Physical Therapy Initial Evaluation      Name: Aggie Rasheed  Clinic Number: [id number]    Therapy Diagnosis:   Encounter Diagnoses   Name Primary?    Acute pain of left shoulder Yes    Decreased ROM of left shoulder     Decreased strength of upper extremity         Physician: Kay Garza MD    Physician Orders: PT Eval and Treat   Medical Diagnosis from Referral:  Chronic left shoulder pain [M25.512, G89.29], Chronic neck pain [M54.2, G89.29], Chronic bilateral low back pain without sciatica [M54.50, G89.29]   Evaluation Date: 3/13/2024  Authorization Period Expiration: 12/31/2024  Plan of Care Expiration: 4/26/2023  Progress Note Due: 3/13/2024  Visit # / Visits authorized: 1/1   FOTO: 1/5  PTA Visit: 0/5    Precautions: Standard; hypertension, type 2 diabetes, tachycardia and lupus    Time In: 1404  Time Out: 1455  Total Billable Time: 51 minutes    Subjective     Date of onset: January 22, 2024    History of current condition - Aggie reports: left shoulder pain beginning at the beginning of the year. Patient was carrying several bags of groceries on that arm to reduce trips to car. Patient was unable to use that arm for several days. Patient reports tingling and maybe some numbness into anterior arm into fingers (unsure which ones) at onset but it has improved. Patient reports she injured the same shoulder a year ago while lifting in flexed and internally rotated position. This pain went away in a few weeks.    Patient also with history of neck pain that limits reading and back pain that limits walking and physical activity.     Falls: One    Imaging:   X-Ray Arthritis Survey 2022:  Cervical spine: Flexion-extension views of the cervical spine demonstrate no acute osseous abnormality.  The vertebral body heights are preserved.  There is mild disc height loss at C4-C5 and C5-C6.  Remaining disc heights are preserved.  Bilateral feet: There are postoperative  changes of left great toe amputation with resection at the proximal phalangeal shaft.  No significant degenerative changes.  Joint spaces are preserved.  Visualized osseous structures are intact.  Lisfranc articulations are congruent.  Bilateral hands: There is severe joint space narrowing and possible subluxations of the bilateral radiocarpal articulations with overlap of the distal radii and the carpal bones.  This is incompletely evaluated on single PA view and may be accentuated by abdomen positioning.  There are no erosive changes.  Bilateral knees: There is no acute fracture or dislocation. Alignment is normal. Joint spaces are preserved. No joint effusion.    Prior Therapy: In  for back pain with improvement  Social History: Patient lives in a single story house with her mother. Patient assists mother with housework and transportation secondary to mother's back pain. Patient does not have to physically assist her  Occupation: Retired. Patient applied for disability 15 years ago but was denied   Prior Level of Function: Independent  Current Level of Function: Impaired    Pain:  Current 4/10, worst 6/10, best 1/10   Location: Left posterior shoulder, throughout scapula  Description: Dull to sharp  Aggravating Factors: Shoulder active range of motion, holding items in hand or lifting objects   Easing Factors: Supine with left shoulder internal rotation     Patients goals: Decrease pain     Medical History:   Past Medical History:   Diagnosis Date    Diabetes mellitus type II     Headache associated with hormonal factors     Hyperlipidemia     Hypertension     Lupus     Protein C deficiency     Tachycardia      Surgical History:   Aggie Rasheed  has a past surgical history that includes  section; Tonsillectomy; Aortic valve surgery; Toe amputation; Wrist fracture surgery; and Colonoscopy (N/A, 2017).    Medications:   Aggie has a current medication list which includes the following  prescription(s): atorvastatin, diclofenac sodium, escitalopram oxalate, fexofenadine, gabapentin, hydralazine, hydroxychloroquine, ipratropium-albuterol, levocetirizine, losartan, metoprolol succinate, nifedipine, nitrofurantoin (macrocrystal-monohydrate), omeprazole, ondansetron, rivaroxaban, sitagliptin phosphate, tiotropium, and trazodone.    Allergies:   Review of patient's allergies indicates:   Allergen Reactions    Niacin preparations Other (See Comments)     Red inflammed eyes    Codeine Rash and Other (See Comments)      Objective      Posture: Thoracic: Functional slump       Shoulders: Anterior humeral head positioning       Scapulae: Bilateral abduction      Palpation: Tenderness to left pectoralis major, biceps short head tendon, biceps muscle belly, supraspinatus, infraspinatus, teres major and minor, and subscapularis    Range of Motion:    Right  Left    Shoulder Flexion 160/170 104/160!   Shoulder Abduction 180/not tested  70/122!   Shoulder Extension 65/89 57/68!   Shoulder ER  75/88! 49/75!   Shoulder IR To belly To belly   Cervical Flexion [45 deg] WNL    Cervical Extension [55 deg]  WNL    Cervical Rotation [70 deg] WNL WNL   Cervical Side Flexion [40 deg] WNL WNL      Gross movement:    Right  Left   IR (behind back) Not tested  L4    ER (top of head) Not tested  Spine of scapula     Upper Extremity Strength  *=pain   Left  Right  Notes   Supraspinatus 4/5 4-/5! Full can   Infraspinatus 4/5 4-/5!    Teres minor 4+/5 Not tested *    Subscapularis 4/5 4-/5! Belly press   Elbow flexion 5/5 4/5    Elbow extension 5/5 4/5! Anterior shoulder pain    *=secondary to range available    UE Right Left   Lower Trap - Not tested *   Middle Trap - Not tested *   Rhomboids - Not tested *   *=secondary to range available    Special Tests:   Right  Left    External Rotation Lag Sign - +   Drop Arm test - + for pain   Empty Can test - Not tested secondary to pain with remaining testing   Neer's impingement test  - +   Hawkin's Cj (subacromial impingement cluster) - +   Infraspinatus MMT (subacromial impingement cluster) - +   Painful arc (subacromial impingement cluster) - +   O'Briens Test (SLAP cluster) - + with external rotation and internal rotation    Internal Rotation Lag Sign (Subscapularis tear) NT NT   Adson's Test (TOS) NT NT   Costoclavicular Brace Test (TOS) NT NT   Alphonso Test (TOS) NT NT     Joint Mobility: Glenohumeral mobility: Guarded left     Intake Outcome Measure for FOTO Shoulder Survey    Therapist reviewed FOTO scores for Aggie Rasheed on 3/13/2024.   FOTO report - see Media section or FOTO account episode details.    Intake Score: 56%     Treatment     Total Treatment time (time-based codes) separate from Evaluation: 17 minutes     Aggie received the treatments listed below:      therapeutic exercises to develop ROM and flexibility for 9 minutes including:    Table flexion slides: x5 left  Tabletop external rotation: x5 left    Arm han' left     neuromuscular re-education activities to improve: Kinesthetic and Posture for 8 minutes. The following activities were included:    No money: yellow theraband x5    Serratus wall slides: x5. Verbal cues to pull hands apart    Patient Education and Home Exercises     Education provided:   - home exercise program  - findings; prognosis and plan of care     Written Home Exercises Provided: yes. Exercises were reviewed and Aggie was able to demonstrate them prior to the end of the session.  Aggie demonstrated good  understanding of the education provided. See EMR under Patient Instructions for exercises provided during therapy sessions.    Assessment     Aggie is a 58 y.o. female referred to outpatient Physical Therapy with a medical diagnosis of chronic neck and back and left shoulder pain. Patient presents with signs and symptoms consistent with primary shoulder impingement. Limited range of flexion during slides, but good external  rotation without pain. Good kinesthetic awareness during NMR activities.     Patient prognosis is Good.   Patient will benefit from skilled outpatient Physical Therapy to address the deficits stated above and in the chart below, provide patient /family education, and to maximize patientt's level of independence.     Plan of care discussed with patient: Yes  Patient's spiritual, cultural and educational needs considered and patient is agreeable to the plan of care and goals as stated below:     Anticipated Barriers for therapy: Chronicity of pain at other sites; history in medical necessity chart from evaluation    Medical Necessity is demonstrated by the following  History  Co-morbidities and personal factors that may impact the plan of care [] LOW: no personal factors / co-morbidities  [] MODERATE: 1-2 personal factors / co-morbidities  [x] HIGH: 3+ personal factors / co-morbidities    Moderate / High Support Documentation:   Co-morbidities affecting plan of care: Arthritis, Chronic Obstructive Pulmonary Disease, or emphysema, Depression, Diabetes  Type I or II, High Blood Pressure, Prior Surgery, Visual Impairment    Personal Factors:   lifestyle-exercise seldom or never     Examination  Body Structures and Functions, activity limitations and participation restrictions that may impact the plan of care [] LOW: addressing 1-2 elements  [] MODERATE: 3+ elements  [] HIGH: 4+ elements (please support below)    Moderate / High Support Documentation: Above     Clinical Presentation [] LOW: stable  [x] MODERATE: Evolving  [] HIGH: Unstable     Decision Making/ Complexity Score: moderate       Short Term Goals (3 Weeks):   1. Patient will be compliant with home exercise program to assist therapy in restoring pain free motion of the shoulder.   2. Patient will perform full can with 1 lb to 90 degrees without pain to promote lifting and holding coffee cup.   3. Patient will improve left shoulder flexion >/= 20 degrees to  improve functional mobility of upper extremities.  4. Patient will improve left shoulder abduction >/= 20 degrees to improve functional mobility of upper extremities.      Long Term Goals (6 Weeks):   1. Patient will improve FOTO score to >/= 63% to demonstrate improvements in carrying, moving, and handling objects  2. Patient will improve impaired shoulder manual muscle tests to 4/5 bilateral to improve strength for household duties.  3. Patient will improve left shoulder flexion to >/= 160 degrees to improve functional mobility of upper extremities.   4. Patient will improve left shoulder abduction to >/= 160 degrees to improve functional mobility of upper extremities.  5. Patient will deny shoulder pain in sidelying to promote quality of sleep.     Plan     Plan of care Certification: 3/13/2024 to 4/26/2024.    Outpatient Physical Therapy 2 times weekly for 6 weeks to include the following interventions: Cervical/Lumbar Traction, Electrical Stimulation -, Gait Training, Manual Therapy, Moist Heat/ Ice, Neuromuscular Re-ed, Patient Education, Self Care, Therapeutic Activities, and Therapeutic Exercise.     Stefany Brower, PT, DPT, OCS        Physician's Signature: _________________________________________ Date: ________________

## 2024-03-18 ENCOUNTER — OFFICE VISIT (OUTPATIENT)
Dept: GASTROENTEROLOGY | Facility: CLINIC | Age: 59
End: 2024-03-18
Payer: MEDICAID

## 2024-03-18 VITALS
HEIGHT: 61 IN | DIASTOLIC BLOOD PRESSURE: 59 MMHG | WEIGHT: 123.69 LBS | SYSTOLIC BLOOD PRESSURE: 109 MMHG | BODY MASS INDEX: 23.35 KG/M2 | HEART RATE: 65 BPM

## 2024-03-18 DIAGNOSIS — Z86.010 HISTORY OF COLON POLYPS: Primary | ICD-10-CM

## 2024-03-18 PROCEDURE — 99214 OFFICE O/P EST MOD 30 MIN: CPT | Mod: PBBFAC,PO | Performed by: INTERNAL MEDICINE

## 2024-03-18 PROCEDURE — 99999 PR PBB SHADOW E&M-EST. PATIENT-LVL IV: CPT | Mod: PBBFAC,,, | Performed by: INTERNAL MEDICINE

## 2024-03-18 PROCEDURE — 3008F BODY MASS INDEX DOCD: CPT | Mod: CPTII,,, | Performed by: INTERNAL MEDICINE

## 2024-03-18 PROCEDURE — 3078F DIAST BP <80 MM HG: CPT | Mod: CPTII,,, | Performed by: INTERNAL MEDICINE

## 2024-03-18 PROCEDURE — 99202 OFFICE O/P NEW SF 15 MIN: CPT | Mod: S$PBB,,, | Performed by: INTERNAL MEDICINE

## 2024-03-18 PROCEDURE — 4010F ACE/ARB THERAPY RXD/TAKEN: CPT | Mod: CPTII,,, | Performed by: INTERNAL MEDICINE

## 2024-03-18 PROCEDURE — 3074F SYST BP LT 130 MM HG: CPT | Mod: CPTII,,, | Performed by: INTERNAL MEDICINE

## 2024-03-18 PROCEDURE — 1159F MED LIST DOCD IN RCRD: CPT | Mod: CPTII,,, | Performed by: INTERNAL MEDICINE

## 2024-03-18 RX ORDER — SODIUM, POTASSIUM,MAG SULFATES 17.5-3.13G
1 SOLUTION, RECONSTITUTED, ORAL ORAL DAILY
Qty: 1 KIT | Refills: 0 | Status: SHIPPED | OUTPATIENT
Start: 2024-03-18 | End: 2024-03-20

## 2024-03-18 NOTE — PROGRESS NOTES
[facility]    CC: History of colon polyps    HPI 58 y.o. female with DM2, HTN, SLE, and protein C deficiency presenting for screening colonoscopy. She reports constipation over the past 2 years leading to 7+ days without a bowel movement followed by passing a large hard bowel movement. She tried miralax but reports she had diarrhea with this but was unsure how much to put into her drink. She denies any hematochezia, melena, nausea or emesis.     Of note, previously seen in clinic due to microcytic, iron deficiency anemia associated with dark loose stool.  EGD was significant for gastritis. Colonoscopy at that time with 10 mm polyp removed. VCE with blood in the stomach but otherwise unrevealing. She has since required no further transfusions or iron infusions.      Past Medical History  Type II DM  HLD  HTN  Lupus  Protein C Deficiency    Physical Examination  LMP 01/01/2009   General appearance: alert, cooperative, no distress  Lungs: on room air without difficulty breathing  Abdomen: soft, non-tender, non-distended    Prior Endoscopy  VCE (7/2017): red blood in stomach observed due to gastritis seen on EGD; otherwise normal exam  EGD (7/2017): gastritis  Colonoscopy (7/2017): 10 mm polyp removed    Assessment:   Mrs. Aggie Rasheed is a 58 year old female with:  History of colon polyps: due for surveillance 2022    Plan:  - Colonoscopy on 4/22  - Suprep prescribed and sent to [facility] pharmacy      Mely Duarte MD   [employer] [facility]   Markos Calvert MD   [facility] Staff   [address] [place] [address], [address]  [place] [address] [phone]

## 2024-03-18 NOTE — PATIENT INSTRUCTIONS
SUPREP Instructions    [facility] [address]  Clinic Office [phone]  Endoscopy Lab [phone]    You are scheduled for a Colonoscopy with Dr. Calvert  on Monday, April 22, 2024  at [facility] in [place].    Check in at the Hospital -1st floor, Information desk.   Call [phone] to reschedule.    An adult friend/family member must come with you to drive you home.  You cannot drive, take a taxi, Uber/Lyft or bus to leave the Endoscopy Center alone.  If you do not have someone to drive you home, your test will be cancelled.     Please follow the directions of your doctor if you take any pills that thin your blood. If you take these meds: Aggrenox, Brilinta, Effient, Eliquis, Lovenox, Plavix, Pletal, Pradaxa, Ticilid, Xarelto or Coumadin, let the doctor's office know.    Please hold any GLP-1 medications prior to the procedure: Dulaglutide Trulicity(hold week prior), Exenatide Byetta (hold the morning of procedure), Semaglutide Ozempic (hold week prior), Liraglutide Victoza, Saxenda(hold week prior), Lixisenatide Adlyxin (hold the morning of procedure), Semaglutide Rybelsus (hold the morning of procedure), Tirzepatide Mounjaro (hold week prior)     DON'T: On the morning of the test do not take insulin or pills for diabetes.     DO: On the morning of the test, do take any pills for blood pressure, heart, anti-rejection and or seizures with a small sip of water. Bring any inhalers with you.    To have a good prep, you must follow these instructions - please do not use the directions from the pharmacy.    The doctor will send a prescription for the SUPREP.      The Day Before the test:    You can only drink CLEAR LIQUIDS the whole day before your test.  You can't eat any food for the whole day.    You CAN have:  Water, Coffee or decaf coffee (no milk or cream)  Tea  Soft drinks - regular and sugar free  Jello (green or yellow)  Apple Juice, white grape juice, white  cranberry juice  Gatorade, Power Aid, Crystal Light, Frankie Aid  Lemonade and Limeade  Bouillon, clear soup  Snowball, popsicles  YOU CAN'T DRINK ANYTHING RED, PURPLE ORANGE OR BLUE   YOU CAN'T DRINK ALCOHOL  ONLY DRINK WHAT IS ON THE LIST      At 5 pm the night before your test:    Pour the 1st bottle of SUPREP into the cup provided in the box. Add water to the line on the cup and mix well.  Drink the whole cup and then drink 2 more full cups of water over 1 hour.  This can be easier to drink if it is cold. You can mix it 20 minutes ahead of time and place in the refrigerator before you drink it.  You must drink it within 30-45 minutes of mixing it.  Do NOT pour the drink over ice.  You can drink it with a straw.    The Day of the test - We will call you 2 days before your test to tell you what time to get there.    5 hours before you come to the hospital (this may be in the middle of the night)  Pour the 2nd bottle of SUPREP into the cup provided in the box. Add water to the line on the cup and mix well.  Drink the whole cup and then drink 2 more full cups of water over 1 hour.  It might be easier to drink if it is cold. You can mix it 20 minutes ahead of time and place in the refrigerator before you drink it.  You must drink it within 30-45 minutes of mixing it.  Do NOT pour the drink over ice.  You can drink it with a straw.    YOU CAN'T EAT OR DRINK ANYTHING ELSE ONCE YOU FINISH THE PREP    Leave all valuables and jewelry at home. You will be at the hospital for 2-4 hours.    Call the Endoscopy department at [phone] with any questions about your procedure.

## 2024-03-19 ENCOUNTER — CLINICAL SUPPORT (OUTPATIENT)
Dept: REHABILITATION | Facility: HOSPITAL | Age: 59
End: 2024-03-19
Payer: MEDICAID

## 2024-03-19 DIAGNOSIS — M25.512 ACUTE PAIN OF LEFT SHOULDER: Primary | ICD-10-CM

## 2024-03-19 DIAGNOSIS — M25.612 DECREASED ROM OF LEFT SHOULDER: ICD-10-CM

## 2024-03-19 DIAGNOSIS — R29.898 DECREASED STRENGTH OF UPPER EXTREMITY: ICD-10-CM

## 2024-03-19 PROCEDURE — 97110 THERAPEUTIC EXERCISES: CPT | Mod: PN

## 2024-03-19 NOTE — PROGRESS NOTES
[facility]   Physical Therapy Treatment Note      Name: Aggie Rasheed  Clinic Number: [id number]    Therapy Diagnosis:   Encounter Diagnoses   Name Primary?    Acute pain of left shoulder Yes    Decreased ROM of left shoulder     Decreased strength of upper extremity      Physician: Kay Garza MD    Visit Date: 3/19/2024    Physician Orders: PT Eval and Treat   Medical Diagnosis from Referral:  Chronic left shoulder pain [M25.512, G89.29], Chronic neck pain [M54.2, G89.29], Chronic bilateral low back pain without sciatica [M54.50, G89.29]   Evaluation Date: 3/13/2024  Authorization Period Expiration: 12/31/2024  Plan of Care Expiration: 4/26/2023  Progress Note Due: 3/13/2024  Visit # / Visits authorized: 1/12 + 1  FOTO: 2/5  PTA Visit: 0/5     Precautions: Standard; hypertension, type 2 diabetes, tachycardia and lupus     Time In: 1100  Time Out: 1200  Total Billable Time: 60 minutes    Subjective     Patient reports: pain comes and goes. Patient had pain from neck to shoulder and shoulder blade upon supine positioning in bed last night. Patient was unable to get comfortable with positional adjustment, but it resolved after walking and re-transferring into bed.   She was compliant with home exercise program.  Response to previous treatment: similar fluctuating pain  Functional change: home exercise program compliance    Pain: 2/10  Location: Left anterolateral upper arm     Objective      Objective Measures updated at progress report unless specified.     Treatment     Aggie received the treatments listed below:      therapeutic exercises to develop ROM and flexibility for 19 minutes including:    Supine wand flexion: 20x  Pulley scaption: 2' standing, 1' sitting  Shoulder flexion walkouts: 2x10     neuromuscular re-education activities to improve: Kinesthetic and Posture for 31 minutes. The following activities were included:     No money: yellow theraband 2x10   +  shoulder flexion: yellow theraband 2x10  Biceps curls with contralateral assistance: 3 lbs 2x10 left ! Verbal cues to retract scapula     Serratus wall slides with forearms in pillowcase - hands pulling apart and pushing through elbows: 3x8  Straight arm pull downs: green theraband 2x10  Rows: green theraband 2x10     manual therapy techniques: were applied to the: left shoulder for 10 minutes, including:    Grade II-III glenohumeral mobilizations  Passive shoulder flexion, abduction, internal rotation and external rotation     Patient Education and Home Exercises       Education provided:   - Continue home exercise program     Written Home Exercises Provided: yes-3/13. Exercises were reviewed and Aggie was able to demonstrate them prior to the end of the session.  Aggie demonstrated good  understanding of the education provided. See Electronic Medical Record under Patient Instructions for exercises provided during therapy sessions    Assessment     Aggie is a 58 y.o. female that presents to outpatient Physical Therapy with chronic left shoulder pain consistent with primary shoulder impingement. Considerable improvement in range and tolerance to active assisted range of motion. Increased fatigue with middle-lower trapezius and serratus kinesthetic activities. Some pain with biceps curls that reduced with contralateral assistance during extension from 90-0 and simultaneous scapular tone.     Aggie Is progressing well towards her goals.   Patient prognosis is Good.   Patient will continue to benefit from skilled outpatient physical therapy to address the deficits listed in the problem list box on initial evaluation, provide pt/family education and to maximize pt's level of independence in the home and community environment.     Patient's spiritual, cultural and educational needs considered and pt agreeable to plan of care and goals.  Anticipated barriers to physical therapy: Chronicity of pain at other  sites; medical history    Goals: Short Term Goals (3 Weeks):   1. Patient will be compliant with home exercise program to assist therapy in restoring pain free motion of the shoulder. Progressing, not met   2. Patient will perform full can with 1 lb to 90 degrees without pain to promote lifting and holding coffee cup. Progressing, not met   3. Patient will improve left shoulder flexion >/= 20 degrees to improve functional mobility of upper extremities. Progressing, not met   4. Patient will improve left shoulder abduction >/= 20 degrees to improve functional mobility of upper extremities. Progressing, not met      Long Term Goals (6 Weeks):   1. Patient will improve FOTO score to >/= 63% to demonstrate improvements in carrying, moving, and handling objects. Progressing, not met   2. Patient will improve impaired shoulder manual muscle tests to 4/5 bilateral to improve strength for household duties. Progressing, not met   3. Patient will improve left shoulder flexion to >/= 160 degrees to improve functional mobility of upper extremities. Progressing, not met   4. Patient will improve left shoulder abduction to >/= 160 degrees to improve functional mobility of upper extremities. Progressing, not met   5. Patient will deny shoulder pain in sidelying to promote quality of sleep. Progressing, not met     Plan     Increase cuff and periscapular strength  Increase shoulder active range of motion      Stefany Brower, PT, DPT, OCS

## 2024-03-26 ENCOUNTER — CLINICAL SUPPORT (OUTPATIENT)
Dept: REHABILITATION | Facility: HOSPITAL | Age: 59
End: 2024-03-26
Payer: MEDICAID

## 2024-03-26 DIAGNOSIS — M25.612 DECREASED ROM OF LEFT SHOULDER: ICD-10-CM

## 2024-03-26 DIAGNOSIS — R29.898 DECREASED STRENGTH OF UPPER EXTREMITY: ICD-10-CM

## 2024-03-26 DIAGNOSIS — M25.512 ACUTE PAIN OF LEFT SHOULDER: Primary | ICD-10-CM

## 2024-03-26 PROCEDURE — 97112 NEUROMUSCULAR REEDUCATION: CPT | Mod: PN

## 2024-03-26 PROCEDURE — 97110 THERAPEUTIC EXERCISES: CPT | Mod: PN

## 2024-03-26 PROCEDURE — 97140 MANUAL THERAPY 1/> REGIONS: CPT | Mod: PN

## 2024-03-26 NOTE — PROGRESS NOTES
[facility]   Physical Therapy Treatment Note      Name: Aggie Rasheed  Clinic Number: [id number]    Therapy Diagnosis:   Encounter Diagnoses   Name Primary?    Acute pain of left shoulder Yes    Decreased ROM of left shoulder     Decreased strength of upper extremity      Physician: Kay Garza MD    Visit Date: 3/26/2024    Physician Orders: PT Eval and Treat   Medical Diagnosis from Referral:  Chronic left shoulder pain [M25.512, G89.29], Chronic neck pain [M54.2, G89.29], Chronic bilateral low back pain without sciatica [M54.50, G89.29]   Evaluation Date: 3/13/2024  Authorization Period Expiration: 12/31/2024  Plan of Care Expiration: 4/26/2023  Progress Note Due: 3/13/2024  Visit # / Visits authorized: 2/12 + 1  FOTO: 3/5  PTA Visit: 0/5     Precautions: Standard; hypertension, type 2 diabetes, tachycardia and lupus     Time In: 1300  Time Out: 1354  Total Billable Time: 54 minutes    Subjective     Patient reports: pain is improving.  She was compliant with home exercise program.  Response to previous treatment: decreased pain  Functional change: less sharpness with end range shoulder active range of motion. Less pain while turning steering wheel.    Pain: 1/10  Location: Left anterolateral upper arm     Objective      Left shoulder active/passive range of motion:    Flexion: 145/155    Abduction: 140/143   External rotation at 90 deg abd: 75/79 !   Internal rotation at 90 deg abd: Full ! (Combined with extension)   Extension: 51/61 !    Treatment     Aggie received the treatments listed below:      therapeutic exercises to develop ROM and flexibility for 18 minutes including:    Supine wand flexion: 2x10    Standing:  Shoulder internal rotation: green theraband 3x10 left with towel under arm  Shoulder external rotation: red theraband 2x10 left     neuromuscular re-education activities to improve: Kinesthetic and Posture for 26 minutes. The following activities were  included:     Seated:  No money: red theraband 3x15   + shoulder flexion: red theraband 2x10  Biceps curls: 3 lbs 2x15 left      Standing:  Serratus wall slides with forearms in pillowcase - hands pulling apart and pushing through elbows: 3x10  Straight arm pull downs: red theraband 3x10  Rows: green theraband 3x10     manual therapy techniques: were applied to the: left shoulder for 10 minutes, including:    Grade II-III glenohumeral mobilizations  Passive shoulder flexion, abduction, internal rotation and external rotation     Patient Education and Home Exercises       Education provided:   - Continue home exercise program     Written Home Exercises Provided: yes-3/13. Exercises were reviewed and Aggie was able to demonstrate them prior to the end of the session.  Aggie demonstrated good  understanding of the education provided. See Electronic Medical Record under Patient Instructions for exercises provided during therapy sessions    Assessment     Aggie is a 58 y.o. female that presents to outpatient Physical Therapy with chronic left shoulder pain consistent with primary shoulder impingement. Less fatigue and minimal pain during exercise allowing for progression of difficulty. Shoulder active range of motion improved by 13-70 degrees since evaluation.     Aggie Is progressing well towards her goals.   Patient prognosis is Good.   Patient will continue to benefit from skilled outpatient physical therapy to address the deficits listed in the problem list box on initial evaluation, provide pt/family education and to maximize pt's level of independence in the home and community environment.     Patient's spiritual, cultural and educational needs considered and pt agreeable to plan of care and goals.  Anticipated barriers to physical therapy: Chronicity of pain at other sites; medical history    Goals: Short Term Goals (3 Weeks):   1. Patient will be compliant with home exercise program to assist therapy  in restoring pain free motion of the shoulder. Met 3/26  2. Patient will perform full can with 1 lb to 90 degrees without pain to promote lifting and holding coffee cup. Progressing, not met   3. Patient will improve left shoulder flexion >/= 20 degrees to improve functional mobility of upper extremities. Met 3/26  4. Patient will improve left shoulder abduction >/= 20 degrees to improve functional mobility of upper extremities. Met 3/26     Long Term Goals (6 Weeks):   1. Patient will improve FOTO score to >/= 63% to demonstrate improvements in carrying, moving, and handling objects. Progressing, not met   2. Patient will improve impaired shoulder manual muscle tests to 4/5 bilateral to improve strength for household duties. Progressing, not met   3. Patient will improve left shoulder flexion to >/= 160 degrees to improve functional mobility of upper extremities. Progressing, not met   4. Patient will improve left shoulder abduction to >/= 160 degrees to improve functional mobility of upper extremities. Progressing, not met   5. Patient will deny shoulder pain in sidelying to promote quality of sleep. Progressing, not met     Plan     Increase cuff and periscapular strength  Increase shoulder active range of motion      Stefany Brower, PT, DPT, OCS

## 2024-03-28 ENCOUNTER — CLINICAL SUPPORT (OUTPATIENT)
Dept: REHABILITATION | Facility: HOSPITAL | Age: 59
End: 2024-03-28
Payer: MEDICAID

## 2024-03-28 DIAGNOSIS — M25.612 DECREASED ROM OF LEFT SHOULDER: ICD-10-CM

## 2024-03-28 DIAGNOSIS — R29.898 DECREASED STRENGTH OF UPPER EXTREMITY: ICD-10-CM

## 2024-03-28 DIAGNOSIS — M25.512 ACUTE PAIN OF LEFT SHOULDER: Primary | ICD-10-CM

## 2024-03-28 PROCEDURE — 97110 THERAPEUTIC EXERCISES: CPT | Mod: PN,CQ

## 2024-03-28 PROCEDURE — 97112 NEUROMUSCULAR REEDUCATION: CPT | Mod: PN,CQ

## 2024-03-28 NOTE — PROGRESS NOTES
[facility]   Physical Therapy Treatment Note      Name: Aggei Rasheed  Clinic Number: [id number]    Therapy Diagnosis:   Encounter Diagnoses   Name Primary?    Acute pain of left shoulder Yes    Decreased ROM of left shoulder     Decreased strength of upper extremity      Physician: Kay Garza MD    Visit Date: 3/28/2024    Physician Orders: PT Eval and Treat   Medical Diagnosis from Referral:  Chronic left shoulder pain [M25.512, G89.29], Chronic neck pain [M54.2, G89.29], Chronic bilateral low back pain without sciatica [M54.50, G89.29]   Evaluation Date: 3/13/2024  Authorization Period Expiration: 12/31/2024  Plan of Care Expiration: 4/26/2023  Progress Note Due: 3/13/2024  Visit # / Visits authorized: 2/12 + 1  FOTO: 3/5  PTA Visit: 0/5     Precautions: Standard; hypertension, type 2 diabetes, tachycardia and lupus     Time In: 1100  Time Out: 1159  Total Billable Time: 59 minutes    Subjective     Patient reports: pain is improving.  She was compliant with home exercise program.  Response to previous treatment: decreased pain  Functional change: less sharpness with end range shoulder active range of motion. Less pain while turning steering wheel.    Pain: 1/10  Location: Left anterolateral upper arm     Objective      Left shoulder active/passive range of motion:    Flexion: 145/155    Abduction: 140/143   External rotation at 90 deg abd: 75/79 !   Internal rotation at 90 deg abd: Full ! (Combined with extension)   Extension: 51/61 !    Treatment     Aggie received the treatments listed below:      therapeutic exercises to develop ROM and flexibility for 24 minutes including:    Supine wand flexion: 2x10 (end range flexion  pain)    Standing:  Shoulder internal rotation: green theraband 3x10 left with towel under arm  Shoulder external rotation: red theraband 2x10 left     Side lying:   External rotation 2x10 with 1# DB (decreased range for pain free movement)  Abd 2x10  " (decreased range for pain free movement, initially 90 progressing to 130 degrees)  Flexion 2x10  (decreased range for pain free movement,to about 90 degrees)    Flexion walk outs x10 with 5" holds (About 140 degress of flexion)      neuromuscular re-education activities to improve: Kinesthetic and Posture for 25 minutes. The following activities were included:     Seated:  No money: red theraband 3x15   + shoulder flexion: red theraband 2x10  Biceps curls: 3 lbs 2x15 left      Standing:  Serratus wall slides with forearms in pillowcase - hands pulling apart and pushing through elbows: 3x10  Straight arm pull downs: red theraband 3x10  Rows: green theraband 3x10     manual therapy techniques: were applied to the: left shoulder for 10 minutes, including:    Grade II-III glenohumeral mobilizations  Passive shoulder flexion, abduction, internal rotation and external rotation     Patient Education and Home Exercises       Education provided:   - Continue home exercise program     Written Home Exercises Provided: yes-3/13. Exercises were reviewed and Aggie was able to demonstrate them prior to the end of the session.  Aggie demonstrated good  understanding of the education provided. See Electronic Medical Record under Patient Instructions for exercises provided during therapy sessions    Assessment     Aggie is a 58 y.o. female that presents to outpatient Physical Therapy with chronic left shoulder pain consistent with primary shoulder impingement. End range flexion and external rotation pain. Modified for exercises as indicated. Progressions and new interventions in bold. Better tolerated to side lying and flexion walk outs facilitating improved passive and active flexion.     Aggie Is progressing well towards her goals.   Patient prognosis is Good.   Patient will continue to benefit from skilled outpatient physical therapy to address the deficits listed in the problem list box on initial evaluation, provide " pt/family education and to maximize pt's level of independence in the home and community environment.     Patient's spiritual, cultural and educational needs considered and pt agreeable to plan of care and goals.  Anticipated barriers to physical therapy: Chronicity of pain at other sites; medical history    Goals: Short Term Goals (3 Weeks):   1. Patient will be compliant with home exercise program to assist therapy in restoring pain free motion of the shoulder. Met 3/26  2. Patient will perform full can with 1 lb to 90 degrees without pain to promote lifting and holding coffee cup. Progressing, not met   3. Patient will improve left shoulder flexion >/= 20 degrees to improve functional mobility of upper extremities. Met 3/26  4. Patient will improve left shoulder abduction >/= 20 degrees to improve functional mobility of upper extremities. Met 3/26     Long Term Goals (6 Weeks):   1. Patient will improve FOTO score to >/= 63% to demonstrate improvements in carrying, moving, and handling objects. Progressing, not met   2. Patient will improve impaired shoulder manual muscle tests to 4/5 bilateral to improve strength for household duties. Progressing, not met   3. Patient will improve left shoulder flexion to >/= 160 degrees to improve functional mobility of upper extremities. Progressing, not met   4. Patient will improve left shoulder abduction to >/= 160 degrees to improve functional mobility of upper extremities. Progressing, not met   5. Patient will deny shoulder pain in sidelying to promote quality of sleep. Progressing, not met     Plan     Increase cuff and periscapular strength  Increase shoulder active range of motion      Jesse Whitten PTA

## 2024-04-02 ENCOUNTER — CLINICAL SUPPORT (OUTPATIENT)
Dept: REHABILITATION | Facility: HOSPITAL | Age: 59
End: 2024-04-02
Payer: MEDICAID

## 2024-04-02 DIAGNOSIS — R29.898 DECREASED STRENGTH OF UPPER EXTREMITY: ICD-10-CM

## 2024-04-02 DIAGNOSIS — M25.612 DECREASED ROM OF LEFT SHOULDER: ICD-10-CM

## 2024-04-02 DIAGNOSIS — M25.512 ACUTE PAIN OF LEFT SHOULDER: Primary | ICD-10-CM

## 2024-04-02 PROCEDURE — 97110 THERAPEUTIC EXERCISES: CPT | Mod: PN

## 2024-04-02 NOTE — PROGRESS NOTES
"[facility]   Physical Therapy Treatment Note      Name: Aggie Rasheed  Clinic Number: [id number]    Therapy Diagnosis:   Encounter Diagnoses   Name Primary?    Acute pain of left shoulder Yes    Decreased ROM of left shoulder     Decreased strength of upper extremity      Physician: Kay Garza MD    Visit Date: 4/2/2024    Physician Orders: PT Eval and Treat   Medical Diagnosis from Referral:  Chronic left shoulder pain [M25.512, G89.29], Chronic neck pain [M54.2, G89.29], Chronic bilateral low back pain without sciatica [M54.50, G89.29]   Evaluation Date: 3/13/2024  Authorization Period Expiration: 12/31/2024  Plan of Care Expiration: 4/26/2023  Progress Note Due: 3/13/2024  Visit # / Visits authorized: 4/12 + 1  FOTO: 5/6 NEXT  PTA Visit: 0/5     Precautions: Standard; hypertension, type 2 diabetes, tachycardia and lupus     Time In: 1100  Time Out: 1155  Total Billable Time: 55 minutes    Subjective     Patient reports: she feels like she tweaked her shoulder while externally rotating it to don seat belt.   She was compliant with home exercise program.  Response to previous treatment: soreness globally around shoulder  Functional change: similar to last visit - increased motion and fluidity of movement     Pain: 2/10  Location: Left anterior and posterior upper arm     Objective      Objective Measures updated at progress report unless specified.      Treatment     Aggie received the treatments listed below:      therapeutic exercises to develop ROM and flexibility for 9 minutes including:    Supine wand flexion: 2x5  Sidelying sleeper stretch: 3x15" left   Doorway pec stretch-elbow bent and shoulder 90 degrees: 3x15" left     neuromuscular re-education activities to improve: Kinesthetic and Posture for 30 minutes. The following activities were included:     Sidelying:   External rotation: 1 lb 2x10 left with towel under arm  Abduction: 1 lb 2x10 left   Flexion 2x10 left "     Seated:  No money: red theraband x20   + shoulder flexion: red theraband 2x10  Biceps curls: 2 lbs 3x10 left      Standing:  Serratus wall slides with forearms in pillowcase - hands pulling apart and pushing through elbows: 3x10  Rows: green theraband 2x15  Shoulder internal rotation: green theraband 3x10 left with towel under arm  Shoulder external rotation: red theraband 2x10 left with towel under arm    manual therapy techniques: were applied to the: left shoulder for 16 minutes, including:    Grade II-III glenohumeral mobilizations, emphasis on posterior and inferior  Passive shoulder flexion, internal rotation and external rotation with emphasized mobilizations + distraction  Soft tissue mobilization to left pec major    Patient Education and Home Exercises       Education provided:   - Continue home exercise program     Written Home Exercises Provided: yes-3/13. Exercises were reviewed and Aggie was able to demonstrate them prior to the end of the session.  Aggie demonstrated good  understanding of the education provided. See Electronic Medical Record under Patient Instructions for exercises provided during therapy sessions    Assessment     Aggie is a 58 y.o. female that presents to outpatient Physical Therapy with chronic left shoulder pain consistent with primary shoulder impingement. Greater pain and guarding with internal rotation followed by flexion. Kept interventions similar secondary to soreness after last visit.     Aggie Is progressing well towards her goals.   Patient prognosis is Good.   Patient will continue to benefit from skilled outpatient physical therapy to address the deficits listed in the problem list box on initial evaluation, provide pt/family education and to maximize pt's level of independence in the home and community environment.     Patient's spiritual, cultural and educational needs considered and pt agreeable to plan of care and goals.  Anticipated barriers to  physical therapy: Chronicity of pain at other sites; medical history    Goals: Short Term Goals (3 Weeks):   1. Patient will be compliant with home exercise program to assist therapy in restoring pain free motion of the shoulder. Met 3/26  2. Patient will perform full can with 1 lb to 90 degrees without pain to promote lifting and holding coffee cup. Progressing, not met   3. Patient will improve left shoulder flexion >/= 20 degrees to improve functional mobility of upper extremities. Met 3/26  4. Patient will improve left shoulder abduction >/= 20 degrees to improve functional mobility of upper extremities. Met 3/26     Long Term Goals (6 Weeks):   1. Patient will improve FOTO score to >/= 63% to demonstrate improvements in carrying, moving, and handling objects. Progressing, not met   2. Patient will improve impaired shoulder manual muscle tests to 4/5 bilateral to improve strength for household duties. Progressing, not met   3. Patient will improve left shoulder flexion to >/= 160 degrees to improve functional mobility of upper extremities. Progressing, not met   4. Patient will improve left shoulder abduction to >/= 160 degrees to improve functional mobility of upper extremities. Progressing, not met   5. Patient will deny shoulder pain in sidelying to promote quality of sleep. Progressing, not met     Plan     Increase cuff and periscapular strength.   Increase shoulder internal rotation and flexion active range of motion. Measure soon    Stefany Brower, PT, DPT, OCS

## 2024-04-04 ENCOUNTER — CLINICAL SUPPORT (OUTPATIENT)
Dept: REHABILITATION | Facility: HOSPITAL | Age: 59
End: 2024-04-04
Payer: MEDICAID

## 2024-04-04 DIAGNOSIS — M25.512 ACUTE PAIN OF LEFT SHOULDER: Primary | ICD-10-CM

## 2024-04-04 DIAGNOSIS — M25.612 DECREASED ROM OF LEFT SHOULDER: ICD-10-CM

## 2024-04-04 DIAGNOSIS — R29.898 DECREASED STRENGTH OF UPPER EXTREMITY: ICD-10-CM

## 2024-04-04 PROCEDURE — 97110 THERAPEUTIC EXERCISES: CPT | Mod: PN,CQ

## 2024-04-04 NOTE — PROGRESS NOTES
"[facility]   Physical Therapy Treatment Note      Name: Aggie Rasheed  Clinic Number: [id number]    Therapy Diagnosis:   Encounter Diagnoses   Name Primary?    Acute pain of left shoulder Yes    Decreased ROM of left shoulder     Decreased strength of upper extremity      Physician: Kay Garza MD    Visit Date: 4/4/2024    Physician Orders: PT Eval and Treat   Medical Diagnosis from Referral:  Chronic left shoulder pain [M25.512, G89.29], Chronic neck pain [M54.2, G89.29], Chronic bilateral low back pain without sciatica [M54.50, G89.29]   Evaluation Date: 3/13/2024  Authorization Period Expiration: 12/31/2024  Plan of Care Expiration: 4/26/2023  Progress Note Due: 3/13/2024  Visit # / Visits authorized: 4/12 + 1  FOTO: 5/6 2nd foto completed 4/4/24  PTA Visit: 1/5     Precautions: Standard; hypertension, type 2 diabetes, tachycardia and lupus     Time In: 1100  Time Out: 1200  Total Billable Time: 60 minutes    Subjective     Patient reports: continued end range external rotation and extension pain   She was compliant with home exercise program.  Response to previous treatment: soreness globally around shoulder  Functional change: similar to last visit - increased motion and fluidity of movement     Pain: 1/10  Location: Left anterior and posterior upper arm     Objective      Objective Measures updated at progress report unless specified.      Treatment     Aggie received the treatments listed below:      therapeutic exercises to develop ROM and flexibility for 15 minutes including:  UBE: 3min fwd/3min retro Level 3.0    Supine wand flexion: 2x5  Sidelying sleeper stretch: 3x15" left   Doorway pec stretch-elbow bent and shoulder 90 degrees: 3x15" left     neuromuscular re-education activities to improve: Kinesthetic and Posture for 30 minutes. The following activities were included:     Sidelying:   External rotation: 1 lb 2x10 left with towel under arm  Abduction: 1 lb " 2x10 left (limited to 125 degrees pain free range)  Flexion 2x10 left 1 lb (limited to 100 degrees pain free range)    Seated:  No money: red theraband 3x10   + shoulder flexion: red theraband 2x10  Biceps curls: 2 lbs 3x10 left      Standing:  Serratus wall slides with forearms in pillowcase - hands pulling apart and pushing through elbows: 3x10  Rows: green theraband 2x15  Shoulder internal rotation: green theraband 3x10 left with towel under arm  Shoulder external rotation: red theraband 2x10 left with towel under arm    manual therapy techniques: were applied to the: left shoulder for 15 minutes, including:    Grade II-III glenohumeral mobilizations, emphasis on posterior and inferior  Passive shoulder flexion, internal rotation and external rotation with emphasized mobilizations + distraction  Soft tissue mobilization to left pec major  Agonist inhibition x3 to increase external rotation     Patient Education and Home Exercises       Education provided:   - Continue home exercise program     Written Home Exercises Provided: yes-3/13. Exercises were reviewed and Aggie was able to demonstrate them prior to the end of the session.  Aggie demonstrated good  understanding of the education provided. See Electronic Medical Record under Patient Instructions for exercises provided during therapy sessions    Assessment     Aggie is a 58 y.o. female that presents to outpatient Physical Therapy with chronic left shoulder pain consistent with primary shoulder impingement. Greater pain and guarding with internal rotation followed by flexion. Kept interventions similar.    Aggie Is progressing well towards her goals.   Patient prognosis is Good.   Patient will continue to benefit from skilled outpatient physical therapy to address the deficits listed in the problem list box on initial evaluation, provide pt/family education and to maximize pt's level of independence in the home and community environment.      Patient's spiritual, cultural and educational needs considered and pt agreeable to plan of care and goals.  Anticipated barriers to physical therapy: Chronicity of pain at other sites; medical history    Goals: Short Term Goals (3 Weeks):   1. Patient will be compliant with home exercise program to assist therapy in restoring pain free motion of the shoulder. Met 3/26  2. Patient will perform full can with 1 lb to 90 degrees without pain to promote lifting and holding coffee cup. Progressing, not met   3. Patient will improve left shoulder flexion >/= 20 degrees to improve functional mobility of upper extremities. Met 3/26  4. Patient will improve left shoulder abduction >/= 20 degrees to improve functional mobility of upper extremities. Met 3/26     Long Term Goals (6 Weeks):   1. Patient will improve FOTO score to >/= 63% to demonstrate improvements in carrying, moving, and handling objects. Progressing, not met   2. Patient will improve impaired shoulder manual muscle tests to 4/5 bilateral to improve strength for household duties. Progressing, not met   3. Patient will improve left shoulder flexion to >/= 160 degrees to improve functional mobility of upper extremities. Progressing, not met   4. Patient will improve left shoulder abduction to >/= 160 degrees to improve functional mobility of upper extremities. Progressing, not met   5. Patient will deny shoulder pain in sidelying to promote quality of sleep. Progressing, not met     Plan     Increase cuff and periscapular strength.   Increase shoulder internal rotation and flexion active range of motion. Measure david Whitten, PTA

## 2024-04-18 ENCOUNTER — TELEPHONE (OUTPATIENT)
Dept: GASTROENTEROLOGY | Facility: CLINIC | Age: 59
End: 2024-04-18
Payer: MEDICAID

## 2024-04-18 ENCOUNTER — TELEPHONE (OUTPATIENT)
Dept: ENDOSCOPY | Facility: HOSPITAL | Age: 59
End: 2024-04-18
Payer: MEDICAID

## 2024-04-18 NOTE — TELEPHONE ENCOUNTER
Spoke with patient about arrival time @. 730  Covid test =     Prep instructions reviewed: the day before the procedure, follow a clear liquid diet all day, then start the first 1/2 of prep at 5pm and take 2nd 1/2 of prep @. 230 Pt must be completely NPO when prep completed @.   430      Medications: Do not take Insulin or oral diabetic medications the day of the procedure.  Take as prescribed: heart, seizure and blood pressure medication in the morning with a sip of water (less than an ounce).  Take any breathing medications and bring inhalers to hospital with you Leave all valuables and jewelry at home.     Wear comfortable clothes to procedure to change into hospital gown You cannot drive for 24 hours after your procedure because you will receive sedation for your procedure to make you comfortable.  A ride must be provided at discharge.

## 2024-04-18 NOTE — TELEPHONE ENCOUNTER
----- Message from Allen Matthew sent at 4/18/2024  2:18 PM CDT -----  Contact: pt  Type: Requesting to speak with nurse        Who Called: PT  Regarding: would like to know what medicines should she stop taking prior to surgery   Would the patient rather a call back or a response via [facility]? Call back or message through portal   Best Call Back Number:  [phone]  Additional Information:

## 2024-04-22 ENCOUNTER — ANESTHESIA (OUTPATIENT)
Dept: ENDOSCOPY | Facility: HOSPITAL | Age: 59
End: 2024-04-22
Payer: MEDICAID

## 2024-04-22 ENCOUNTER — ANESTHESIA EVENT (OUTPATIENT)
Dept: ENDOSCOPY | Facility: HOSPITAL | Age: 59
End: 2024-04-22
Payer: MEDICAID

## 2024-04-22 ENCOUNTER — HOSPITAL ENCOUNTER (OUTPATIENT)
Facility: HOSPITAL | Age: 59
Discharge: HOME | End: 2024-04-22
Attending: INTERNAL MEDICINE | Admitting: INTERNAL MEDICINE
Payer: MEDICAID

## 2024-04-22 VITALS
OXYGEN SATURATION: 98 % | TEMPERATURE: 97 F | WEIGHT: 120 LBS | RESPIRATION RATE: 20 BRPM | DIASTOLIC BLOOD PRESSURE: 59 MMHG | BODY MASS INDEX: 22.66 KG/M2 | HEART RATE: 71 BPM | HEIGHT: 61 IN | SYSTOLIC BLOOD PRESSURE: 128 MMHG

## 2024-04-22 DIAGNOSIS — Z12.11 COLON CANCER SCREENING: ICD-10-CM

## 2024-04-22 LAB — POCT GLUCOSE: 108 MG/DL (ref 70–110)

## 2024-04-22 PROCEDURE — 88305 TISSUE EXAM BY PATHOLOGIST: CPT | Mod: 26,,, | Performed by: STUDENT IN AN ORGANIZED HEALTH CARE EDUCATION/TRAINING PROGRAM

## 2024-04-22 PROCEDURE — 82962 GLUCOSE BLOOD TEST: CPT | Performed by: INTERNAL MEDICINE

## 2024-04-22 PROCEDURE — 37000009 HC ANESTHESIA EA ADD 15 MINS: Performed by: INTERNAL MEDICINE

## 2024-04-22 PROCEDURE — 45381 COLONOSCOPY SUBMUCOUS NJX: CPT | Performed by: INTERNAL MEDICINE

## 2024-04-22 PROCEDURE — D9220A PRA ANESTHESIA: Mod: CRNA,,, | Performed by: NURSE ANESTHETIST, CERTIFIED REGISTERED

## 2024-04-22 PROCEDURE — 37000008 HC ANESTHESIA 1ST 15 MINUTES: Performed by: INTERNAL MEDICINE

## 2024-04-22 PROCEDURE — D9220A PRA ANESTHESIA: Mod: ANES,,, | Performed by: ANESTHESIOLOGY

## 2024-04-22 PROCEDURE — 27201089 HC SNARE, DISP (ANY): Performed by: INTERNAL MEDICINE

## 2024-04-22 PROCEDURE — 88305 TISSUE EXAM BY PATHOLOGIST: CPT | Performed by: STUDENT IN AN ORGANIZED HEALTH CARE EDUCATION/TRAINING PROGRAM

## 2024-04-22 PROCEDURE — 25000003 PHARM REV CODE 250: Performed by: INTERNAL MEDICINE

## 2024-04-22 PROCEDURE — 63600175 PHARM REV CODE 636 W HCPCS: Performed by: NURSE ANESTHETIST, CERTIFIED REGISTERED

## 2024-04-22 PROCEDURE — 25000003 PHARM REV CODE 250: Performed by: NURSE ANESTHETIST, CERTIFIED REGISTERED

## 2024-04-22 PROCEDURE — 45385 COLONOSCOPY W/LESION REMOVAL: CPT | Performed by: INTERNAL MEDICINE

## 2024-04-22 RX ORDER — LIDOCAINE HYDROCHLORIDE 20 MG/ML
INJECTION INTRAVENOUS
Status: DISCONTINUED | OUTPATIENT
Start: 2024-04-22 | End: 2024-04-22

## 2024-04-22 RX ORDER — SODIUM CHLORIDE 9 MG/ML
INJECTION, SOLUTION INTRAVENOUS CONTINUOUS
Status: DISCONTINUED | OUTPATIENT
Start: 2024-04-22 | End: 2024-04-22 | Stop reason: HOSPADM

## 2024-04-22 RX ORDER — PROPOFOL 10 MG/ML
VIAL (ML) INTRAVENOUS
Status: DISCONTINUED | OUTPATIENT
Start: 2024-04-22 | End: 2024-04-22

## 2024-04-22 RX ORDER — SODIUM CHLORIDE 0.9 % (FLUSH) 0.9 %
10 SYRINGE (ML) INJECTION
Status: DISCONTINUED | OUTPATIENT
Start: 2024-04-22 | End: 2024-04-22 | Stop reason: HOSPADM

## 2024-04-22 RX ADMIN — LIDOCAINE HYDROCHLORIDE 100 MG: 20 INJECTION, SOLUTION INTRAVENOUS at 09:04

## 2024-04-22 RX ADMIN — PROPOFOL 150 MCG/KG/MIN: 10 INJECTION, EMULSION INTRAVENOUS at 09:04

## 2024-04-22 RX ADMIN — SODIUM CHLORIDE: 9 INJECTION, SOLUTION INTRAVENOUS at 08:04

## 2024-04-22 RX ADMIN — PROPOFOL 80 MG: 10 INJECTION, EMULSION INTRAVENOUS at 09:04

## 2024-04-22 NOTE — ANESTHESIA PREPROCEDURE EVALUATION
04/22/2024  Aggie Rasheed is a 58 y.o., female.      Pre-op Assessment    I have reviewed the Patient Summary Reports.     I have reviewed the Nursing Notes. I have reviewed the NPO Status.   I have reviewed the Medications.     Review of Systems  Anesthesia Hx:  No problems with previous Anesthesia                Social:  Smoker 1 ppd tobacco      Hematology/Oncology:  Hematology Normal   Oncology Normal                                   Cardiovascular:     Hypertension, well controlled           hyperlipidemia                             Pulmonary:   COPD, mild                     Renal/:  Renal/ Normal                 Hepatic/GI:     GERD, well controlled             Musculoskeletal:  Arthritis   lupus            Neurological:           History of brain hemorrhage with onset of hypertension-1996                            Endocrine:  Diabetes, type 2           Psych:   anxiety depression                Physical Exam  General: Well nourished, Cooperative, Alert and Oriented    Airway:  Mallampati: II / II  Mouth Opening: Normal  TM Distance: Normal  Tongue: Normal  Neck ROM: Normal ROM    Dental:  Intact    Chest/Lungs:  Clear to auscultation, Normal Respiratory Rate    Heart:  Rate: Normal  Rhythm: Regular Rhythm  Sounds: Normal        Anesthesia Plan  Type of Anesthesia, risks & benefits discussed:    Anesthesia Type: Gen Natural Airway, MAC  Intra-op Monitoring Plan: Standard ASA Monitors  Induction:  IV  Informed Consent: Informed consent signed with the Patient and all parties understand the risks and agree with anesthesia plan.  All questions answered.   ASA Score: 3  Day of Surgery Review of History & Physical: H&P Update referred to the surgeon/provider.    Ready For Surgery From Anesthesia Perspective.     .

## 2024-04-22 NOTE — PROVATION PATIENT INSTRUCTIONS
Discharge Summary/Instructions after an Endoscopic Procedure  Patient Name: Aggie Rasheed  Patient MRN: [id number]  Patient YOB: 1965 Monday, April 22, 2024  Markos Calvert MD  Dear patient,  As a result of recent federal legislation (The Federal Cures Act), you may   receive lab or pathology results from your procedure in your [facility]   account before your physician is able to contact you. Your physician or   their representative will relay the results to you with their   recommendations at their soonest availability.  Thank you,  Your health is very important to us during the Covid Crisis. Following your   procedure today, you will receive a daily text for 2 weeks asking about   signs or symptoms of Covid 19.  Please respond to this text when you   receive it so we can follow up and keep you as safe as possible.   RESTRICTIONS:  During your procedure today, you received medications for sedation.  These   medications may affect your judgment, balance and coordination.  Therefore,   for 24 hours, you have the following restrictions:   - DO NOT drive a car, operate machinery, make legal/financial decisions,   sign important papers or drink alcohol.    ACTIVITY:  Today: no heavy lifting, straining or running due to procedural   sedation/anesthesia.  The following day: return to full activity including work.  DIET:  Eat and drink normally unless instructed otherwise.     TREATMENT FOR COMMON SIDE EFFECTS:  - Mild abdominal pain, nausea, belching, bloating or excessive gas:  rest,   eat lightly and use a heating pad.  - Sore Throat: treat with throat lozenges and/or gargle with warm salt   water.  - Because air was used during the procedure, expelling large amounts of air   from your rectum or belching is normal.  - If a bowel prep was taken, you may not have a bowel movement for 1-3 days.    This is normal.  SYMPTOMS TO WATCH FOR AND REPORT TO YOUR PHYSICIAN:  1. Abdominal pain or bloating, other than  gas cramps.  2. Chest pain.  3. Back pain.  4. Signs of infection such as: chills or fever occurring within 24 hours   after the procedure.  5. Rectal bleeding, which would show as bright red, maroon, or black stools.   (A tablespoon of blood from the rectum is not serious, especially if   hemorrhoids are present.)  6. Vomiting.  7. Weakness or dizziness.  GO DIRECTLY TO THE NEAREST EMERGENCY ROOM IF YOU HAVE ANY OF THE FOLLOWING:      Difficulty breathing              Chills and/or fever over 101 F   Persistent vomiting and/or vomiting blood   Severe abdominal pain   Severe chest pain   Black, tarry stools   Bleeding- more than one tablespoon   Any other symptom or condition that you feel may need urgent attention  Your doctor recommends these additional instructions:  If any biopsies were taken, your doctors clinic will contact you in 1 to 2   weeks with any results.  - Discharge patient to home.   - Resume previous diet.   - Continue present medications.   - Resume Xarelto (rivaroxaban) at prior dose in 2 days.   - Await pathology results.   - Repeat colonoscopy in 3 years for surveillance.   - The signs and symptoms of potential delayed complications were discussed   with the patient. If signs or symptoms of these complications develop, call   the [facility] at 1 [phone].   - Return to normal activities tomorrow.  Written discharge instructions were   provided to the patient.  For questions, problems or results please call your physician - Markos Calvert MD.  EMERGENCY PHONE NUMBER: [phone],  LAB RESULTS: [phone]  IF A COMPLICATION OR EMERGENCY SITUATION ARISES AND YOU ARE UNABLE TO REACH   YOUR PHYSICIAN - GO DIRECTLY TO THE EMERGENCY ROOM.  MD Markos Weiss MD  4/22/2024 10:19:22 AM  This report has been verified and signed electronically.  Dear patient,  As a result of recent federal legislation (The Federal Cures Act), you may   receive lab or pathology  results from your procedure in your [facility]   account before your physician is able to contact you. Your physician or   their representative will relay the results to you with their   recommendations at their soonest availability.  Thank you,  PROVATION

## 2024-04-22 NOTE — H&P
[facility]    CC: History of colon polyps    HPI 58 y.o. female with DM2, HTN, SLE, and protein C deficiency presenting for screening colonoscopy. She reports constipation over the past 2 years leading to 7+ days without a bowel movement followed by passing a large hard bowel movement. She tried miralax but reports she had diarrhea with this but was unsure how much to put into her drink. She denies any hematochezia, melena, nausea or emesis.     Of note, previously seen in clinic due to microcytic, iron deficiency anemia associated with dark loose stool.  EGD was significant for gastritis. Colonoscopy at that time with 10 mm polyp removed. VCE with blood in the stomach but otherwise unrevealing. She has since required no further transfusions or iron infusions.      Past Medical History  Type II DM  HLD  HTN  Lupus  Protein C Deficiency    Physical Examination  LMP 01/01/2009   General appearance: alert, cooperative, no distress  Abdomen: soft, non-tender, non-distended    Prior Endoscopy  VCE (7/2017): red blood in stomach observed due to gastritis seen on EGD; otherwise normal exam  EGD (7/2017): gastritis  Colonoscopy (7/2017): 10 mm polyp removed    Assessment:   Mrs. Aggie Rasheed is a 58 year old female with:  History of colon polyps: due for surveillance 2022    Plan:  - Colonoscopy today  - Suprep  - held DOAC since Friday      Markos Calvert MD   [facility]   [address], [address]  [place] [address] [phone]

## 2024-04-22 NOTE — TRANSFER OF CARE
"Anesthesia Transfer of Care Note    Patient: Aggie Rasheed    Procedure(s) Performed: Procedure(s) (LRB):  COLONOSCOPY (N/A)    Patient location: GI    Anesthesia Type: general    Transport from OR: Transported from OR on room air with adequate spontaneous ventilation    Post pain: adequate analgesia    Post assessment: no apparent anesthetic complications    Post vital signs: stable    Level of consciousness: awake, alert and oriented    Nausea/Vomiting: no nausea/vomiting    Complications: none    Transfer of care protocol was followed      Last vitals: Visit Vitals  BP (!) 158/68 (BP Location: Left arm, Patient Position: Lying)   Pulse 68   Temp 36.8 °C (98.2 °F) (Temporal)   Resp 18   Ht 5' 1" (1.549 m)   Wt 54.4 kg (120 lb)   LMP 01/01/2009   SpO2 100%   Breastfeeding No   BMI 22.67 kg/m²     "

## 2024-04-22 NOTE — ANESTHESIA POSTPROCEDURE EVALUATION
Anesthesia Post Evaluation    Patient: Aggie Rasheed    Procedure(s) Performed: Procedure(s) (LRB):  COLONOSCOPY (N/A)    Final Anesthesia Type: general      Patient location during evaluation: PACU  Patient participation: Yes- Able to Participate  Level of consciousness: awake and alert  Post-procedure vital signs: reviewed and stable  Pain management: adequate  Airway patency: patent    PONV status at discharge: No PONV  Anesthetic complications: no      Respiratory status: spontaneous ventilation and room air  Hydration status: euvolemic  Follow-up not needed.              Vitals Value Taken Time   /59 04/22/24 1045   Temp 36.2 °C (97.2 °F) 04/22/24 1018   Pulse 71 04/22/24 1045   Resp 20 04/22/24 1045   SpO2 98 % 04/22/24 1045         Event Time   Out of Recovery 10:48:51         Pain/Aliyah Score: Aliyah Score: 10 (4/22/2024 10:45 AM)

## 2024-04-24 LAB
FINAL PATHOLOGIC DIAGNOSIS: NORMAL
GROSS: NORMAL
Lab: NORMAL

## 2024-04-30 ENCOUNTER — TELEPHONE (OUTPATIENT)
Dept: GASTROENTEROLOGY | Facility: CLINIC | Age: 59
End: 2024-04-30
Payer: MEDICAID

## 2024-04-30 NOTE — TELEPHONE ENCOUNTER
----- Message from Markos Calvert MD sent at 4/25/2024  3:49 PM CDT -----  Please call patient to report that her path results from colonoscopy shows only benign polyps. Repeat colonoscopy in 3 years as stated in report.

## 2024-04-30 NOTE — TELEPHONE ENCOUNTER
Pt notified path results from colonoscopy show only benign polyps. Repeat colonoscopy in 3 years as stated in report.  Pt acknowledged understanding.

## 2024-11-03 DIAGNOSIS — E78.1 HYPERTRIGLYCERIDEMIA: ICD-10-CM

## 2024-11-04 RX ORDER — ATORVASTATIN CALCIUM 40 MG/1
40 TABLET, FILM COATED ORAL
Qty: 30 TABLET | Refills: 0 | Status: SHIPPED | OUTPATIENT
Start: 2024-11-04

## 2024-11-04 NOTE — TELEPHONE ENCOUNTER
Informed patient that Dr. Montemayor filled Rx for a month and to reach out to [facility] to establish care with new PCP. Patient stated she has new PCP and will reach out to their office to get refills. Patient verbalized gratitude and understanding.

## 2024-11-04 NOTE — TELEPHONE ENCOUNTER
Patient not established under Dr. Montemayor at [facility].     Will inform patient to reach out [facility] to establish care with new PCP    Refill Encounter  Allergies: Confirmed    PCP Visits: Recent Visits  No visits were found meeting these conditions.  Showing recent visits within past 360 days and meeting all other requirements  Future Appointments  No visits were found meeting these conditions.  Showing future appointments within next 720 days and meeting all other requirements     Last 3 Blood Pressure:   BP Readings from Last 3 Encounters:   04/22/24 (!) 128/59   03/18/24 (!) 109/59   10/20/23 (!) 173/70     Preferred Pharmacy:   [id number] - [place] - [address] AT [facility] [address]  [address] [place] [place] [address]  Phone: [phone] Fax: [phone]    Requested RX:  Requested Prescriptions     Pending Prescriptions Disp Refills    atorvastatin (LIPITOR) 40 MG tablet [Pharmacy Med Name: ATORVASTATIN 40MG TABLETS] 90 tablet 3     Sig: TAKE 1 TABLET(40 MG) BY MOUTH EVERY DAY      RX Route: Normal